# Patient Record
Sex: MALE | Race: WHITE | NOT HISPANIC OR LATINO
[De-identification: names, ages, dates, MRNs, and addresses within clinical notes are randomized per-mention and may not be internally consistent; named-entity substitution may affect disease eponyms.]

---

## 2017-04-03 ENCOUNTER — OTHER (OUTPATIENT)
Age: 82
End: 2017-04-03

## 2017-04-05 ENCOUNTER — APPOINTMENT (OUTPATIENT)
Dept: ORTHOPEDIC SURGERY | Facility: CLINIC | Age: 82
End: 2017-04-05

## 2017-04-05 LAB
25(OH)D3 SERPL-MCNC: 34.4 NG/ML
ALBUMIN SERPL ELPH-MCNC: 4.1 G/DL
ALP BLD-CCNC: 64 U/L
ALT SERPL-CCNC: 23 U/L
ANION GAP SERPL CALC-SCNC: 16 MMOL/L
APPEARANCE: CLEAR
AST SERPL-CCNC: 27 U/L
BACTERIA: NEGATIVE
BASOPHILS # BLD AUTO: 0.04 K/UL
BASOPHILS NFR BLD AUTO: 0.7 %
BILIRUB SERPL-MCNC: 0.6 MG/DL
BILIRUBIN URINE: NEGATIVE
BLOOD URINE: NEGATIVE
BUN SERPL-MCNC: 27 MG/DL
CALCIUM SERPL-MCNC: 10.1 MG/DL
CALCIUM SERPL-MCNC: 10.1 MG/DL
CHLORIDE SERPL-SCNC: 106 MMOL/L
CHOLEST SERPL-MCNC: 144 MG/DL
CHOLEST/HDLC SERPL: 3.3 RATIO
CO2 SERPL-SCNC: 23 MMOL/L
COLOR: YELLOW
CREAT SERPL-MCNC: 1.69 MG/DL
CREAT SPEC-SCNC: 26 MG/DL
CREAT/PROT UR: 1.8 RATIO
EOSINOPHIL # BLD AUTO: 0.18 K/UL
EOSINOPHIL NFR BLD AUTO: 3 %
FERRITIN SERPL-MCNC: 224.4 NG/ML
GLUCOSE QUALITATIVE U: NORMAL MG/DL
GLUCOSE SERPL-MCNC: 91 MG/DL
HBA1C MFR BLD HPLC: 4.9 %
HCT VFR BLD CALC: 43.2 %
HDLC SERPL-MCNC: 44 MG/DL
HGB BLD-MCNC: 14.9 G/DL
HYALINE CASTS: 1 /LPF
IMM GRANULOCYTES NFR BLD AUTO: 0.2 %
IRON SATN MFR SERPL: 63 %
IRON SERPL-MCNC: 155 UG/DL
KETONES URINE: NEGATIVE
LDLC SERPL CALC-MCNC: 73 MG/DL
LEUKOCYTE ESTERASE URINE: NEGATIVE
LYMPHOCYTES # BLD AUTO: 1.08 K/UL
LYMPHOCYTES NFR BLD AUTO: 18.3 %
MAN DIFF?: NORMAL
MCHC RBC-ENTMCNC: 30.9 PG
MCHC RBC-ENTMCNC: 34.5 GM/DL
MCV RBC AUTO: 89.6 FL
MICROSCOPIC-UA: NORMAL
MONOCYTES # BLD AUTO: 0.56 K/UL
MONOCYTES NFR BLD AUTO: 9.5 %
NEUTROPHILS # BLD AUTO: 4.04 K/UL
NEUTROPHILS NFR BLD AUTO: 68.3 %
NITRITE URINE: NEGATIVE
PARATHYROID HORMONE INTACT: 141 PG/ML
PH URINE: 6
PHOSPHATE SERPL-MCNC: 3 MG/DL
PLATELET # BLD AUTO: 217 K/UL
POTASSIUM SERPL-SCNC: 4.4 MMOL/L
PROT SERPL-MCNC: 7.1 G/DL
PROT UR-MCNC: 46 MG/DL
PROTEIN URINE: 30 MG/DL
RBC # BLD: 4.82 M/UL
RBC # FLD: 13.6 %
RED BLOOD CELLS URINE: 0 /HPF
SODIUM SERPL-SCNC: 145 MMOL/L
SPECIFIC GRAVITY URINE: 1.01
SQUAMOUS EPITHELIAL CELLS: 0 /HPF
TIBC SERPL-MCNC: 245 UG/DL
TRIGL SERPL-MCNC: 133 MG/DL
UIBC SERPL-MCNC: 90 UG/DL
URATE SERPL-MCNC: 6.5 MG/DL
UROBILINOGEN URINE: NORMAL MG/DL
WBC # FLD AUTO: 5.91 K/UL
WHITE BLOOD CELLS URINE: 0 /HPF

## 2017-04-06 ENCOUNTER — APPOINTMENT (OUTPATIENT)
Dept: NEPHROLOGY | Facility: CLINIC | Age: 82
End: 2017-04-06

## 2017-04-06 VITALS
HEIGHT: 65 IN | OXYGEN SATURATION: 99 % | SYSTOLIC BLOOD PRESSURE: 131 MMHG | WEIGHT: 160 LBS | DIASTOLIC BLOOD PRESSURE: 65 MMHG | HEART RATE: 60 BPM | BODY MASS INDEX: 26.66 KG/M2

## 2017-04-24 ENCOUNTER — NON-APPOINTMENT (OUTPATIENT)
Age: 82
End: 2017-04-24

## 2017-04-24 ENCOUNTER — APPOINTMENT (OUTPATIENT)
Dept: INTERNAL MEDICINE | Facility: CLINIC | Age: 82
End: 2017-04-24

## 2017-04-24 VITALS
SYSTOLIC BLOOD PRESSURE: 140 MMHG | OXYGEN SATURATION: 97 % | BODY MASS INDEX: 26.33 KG/M2 | WEIGHT: 158 LBS | TEMPERATURE: 97.9 F | HEART RATE: 58 BPM | HEIGHT: 65 IN | DIASTOLIC BLOOD PRESSURE: 64 MMHG

## 2017-04-25 RX ORDER — FINASTERIDE 5 MG/1
5 TABLET, FILM COATED ORAL
Refills: 0 | Status: DISCONTINUED | COMMUNITY
End: 2017-04-25

## 2017-04-25 RX ORDER — MULTIVITAMIN WITH MINERALS
TABLET ORAL
Refills: 0 | Status: DISCONTINUED | COMMUNITY
End: 2017-04-25

## 2017-07-25 ENCOUNTER — OTHER (OUTPATIENT)
Age: 82
End: 2017-07-25

## 2017-07-25 DIAGNOSIS — R68.89 OTHER GENERAL SYMPTOMS AND SIGNS: ICD-10-CM

## 2017-07-27 LAB
25(OH)D3 SERPL-MCNC: 24.8 NG/ML
ALBUMIN SERPL ELPH-MCNC: 4.3 G/DL
ALP BLD-CCNC: 74 U/L
ALT SERPL-CCNC: 21 U/L
ANION GAP SERPL CALC-SCNC: 17 MMOL/L
APPEARANCE: CLEAR
AST SERPL-CCNC: 25 U/L
BACTERIA: NEGATIVE
BASOPHILS # BLD AUTO: 0.05 K/UL
BASOPHILS NFR BLD AUTO: 0.7 %
BILIRUB SERPL-MCNC: 0.4 MG/DL
BILIRUBIN URINE: NEGATIVE
BLOOD URINE: NEGATIVE
BUN SERPL-MCNC: 28 MG/DL
CALCIUM SERPL-MCNC: 9.9 MG/DL
CALCIUM SERPL-MCNC: 9.9 MG/DL
CHLORIDE SERPL-SCNC: 105 MMOL/L
CO2 SERPL-SCNC: 23 MMOL/L
COLOR: YELLOW
CREAT SERPL-MCNC: 1.68 MG/DL
CREAT SPEC-SCNC: 45 MG/DL
EOSINOPHIL # BLD AUTO: 0.32 K/UL
EOSINOPHIL NFR BLD AUTO: 4.5 %
FERRITIN SERPL-MCNC: 183 NG/ML
GLUCOSE QUALITATIVE U: NORMAL MG/DL
GLUCOSE SERPL-MCNC: 76 MG/DL
HCT VFR BLD CALC: 44.3 %
HGB BLD-MCNC: 15.2 G/DL
HYALINE CASTS: 1 /LPF
IMM GRANULOCYTES NFR BLD AUTO: 0.3 %
IRON SATN MFR SERPL: 57 %
IRON SERPL-MCNC: 143 UG/DL
KETONES URINE: NEGATIVE
LEUKOCYTE ESTERASE URINE: NEGATIVE
LYMPHOCYTES # BLD AUTO: 1.26 K/UL
LYMPHOCYTES NFR BLD AUTO: 17.5 %
MAN DIFF?: NORMAL
MCHC RBC-ENTMCNC: 31.3 PG
MCHC RBC-ENTMCNC: 34.3 GM/DL
MCV RBC AUTO: 91.3 FL
MICROALBUMIN 24H UR DL<=1MG/L-MCNC: 70.6 MG/DL
MICROALBUMIN/CREAT 24H UR-RTO: 1569 MG/G
MICROSCOPIC-UA: NORMAL
MONOCYTES # BLD AUTO: 0.49 K/UL
MONOCYTES NFR BLD AUTO: 6.8 %
NEUTROPHILS # BLD AUTO: 5.05 K/UL
NEUTROPHILS NFR BLD AUTO: 70.2 %
NITRITE URINE: NEGATIVE
PARATHYROID HORMONE INTACT: 143 PG/ML
PH URINE: 6
PHOSPHATE SERPL-MCNC: 3.2 MG/DL
PLATELET # BLD AUTO: 207 K/UL
POTASSIUM SERPL-SCNC: 4.8 MMOL/L
PROT SERPL-MCNC: 6.8 G/DL
PROTEIN URINE: 100 MG/DL
RBC # BLD: 4.85 M/UL
RBC # FLD: 13.3 %
RED BLOOD CELLS URINE: 1 /HPF
SODIUM SERPL-SCNC: 145 MMOL/L
SPECIFIC GRAVITY URINE: 1.01
SQUAMOUS EPITHELIAL CELLS: 0 /HPF
TIBC SERPL-MCNC: 253 UG/DL
UIBC SERPL-MCNC: 110 UG/DL
URATE SERPL-MCNC: 6.7 MG/DL
UROBILINOGEN URINE: NORMAL MG/DL
WBC # FLD AUTO: 7.19 K/UL
WHITE BLOOD CELLS URINE: 0 /HPF

## 2017-08-14 ENCOUNTER — RX RENEWAL (OUTPATIENT)
Age: 82
End: 2017-08-14

## 2017-09-05 ENCOUNTER — APPOINTMENT (OUTPATIENT)
Dept: ORTHOPEDIC SURGERY | Facility: CLINIC | Age: 82
End: 2017-09-05

## 2017-09-05 ENCOUNTER — OTHER (OUTPATIENT)
Age: 82
End: 2017-09-05

## 2017-09-07 LAB
25(OH)D3 SERPL-MCNC: 23.5 NG/ML
ALBUMIN SERPL ELPH-MCNC: 4.3 G/DL
ALP BLD-CCNC: 72 U/L
ALT SERPL-CCNC: 16 U/L
ANION GAP SERPL CALC-SCNC: 17 MMOL/L
APPEARANCE: CLEAR
AST SERPL-CCNC: 24 U/L
BACTERIA: NEGATIVE
BASOPHILS # BLD AUTO: 0.05 K/UL
BASOPHILS NFR BLD AUTO: 0.8 %
BILIRUB SERPL-MCNC: 0.4 MG/DL
BILIRUBIN URINE: NEGATIVE
BLOOD URINE: NEGATIVE
BUN SERPL-MCNC: 22 MG/DL
CALCIUM SERPL-MCNC: 9.6 MG/DL
CALCIUM SERPL-MCNC: 9.6 MG/DL
CHLORIDE SERPL-SCNC: 102 MMOL/L
CO2 SERPL-SCNC: 24 MMOL/L
COLOR: YELLOW
CREAT SERPL-MCNC: 1.41 MG/DL
CREAT SPEC-SCNC: 53 MG/DL
EOSINOPHIL # BLD AUTO: 0.22 K/UL
EOSINOPHIL NFR BLD AUTO: 3.6 %
FERRITIN SERPL-MCNC: 195 NG/ML
GLUCOSE QUALITATIVE U: NORMAL MG/DL
GLUCOSE SERPL-MCNC: 82 MG/DL
HBA1C MFR BLD HPLC: 4.8 %
HCT VFR BLD CALC: 44.8 %
HGB BLD-MCNC: 15.1 G/DL
IMM GRANULOCYTES NFR BLD AUTO: 0 %
IRON SATN MFR SERPL: 54 %
IRON SERPL-MCNC: 149 UG/DL
KETONES URINE: NEGATIVE
LEUKOCYTE ESTERASE URINE: NEGATIVE
LYMPHOCYTES # BLD AUTO: 1.24 K/UL
LYMPHOCYTES NFR BLD AUTO: 20.1 %
MAN DIFF?: NORMAL
MCHC RBC-ENTMCNC: 30.9 PG
MCHC RBC-ENTMCNC: 33.7 GM/DL
MCV RBC AUTO: 91.8 FL
MICROALBUMIN 24H UR DL<=1MG/L-MCNC: 101.3 MG/DL
MICROALBUMIN/CREAT 24H UR-RTO: 1911 MG/G
MICROSCOPIC-UA: NORMAL
MONOCYTES # BLD AUTO: 0.5 K/UL
MONOCYTES NFR BLD AUTO: 8.1 %
NEUTROPHILS # BLD AUTO: 4.15 K/UL
NEUTROPHILS NFR BLD AUTO: 67.4 %
NITRITE URINE: NEGATIVE
PARATHYROID HORMONE INTACT: 142 PG/ML
PH URINE: 7
PHOSPHATE SERPL-MCNC: 2.8 MG/DL
PLATELET # BLD AUTO: 196 K/UL
POTASSIUM SERPL-SCNC: 4.2 MMOL/L
PROT SERPL-MCNC: 7.4 G/DL
PROTEIN URINE: 300 MG/DL
RBC # BLD: 4.88 M/UL
RBC # FLD: 13.6 %
RED BLOOD CELLS URINE: 1 /HPF
SODIUM SERPL-SCNC: 143 MMOL/L
SPECIFIC GRAVITY URINE: 1.01
SQUAMOUS EPITHELIAL CELLS: 0 /HPF
TIBC SERPL-MCNC: 275 UG/DL
UIBC SERPL-MCNC: 126 UG/DL
URATE SERPL-MCNC: 5.7 MG/DL
UROBILINOGEN URINE: NORMAL MG/DL
WBC # FLD AUTO: 6.16 K/UL
WHITE BLOOD CELLS URINE: 0 /HPF

## 2017-09-08 ENCOUNTER — APPOINTMENT (OUTPATIENT)
Dept: NEPHROLOGY | Facility: CLINIC | Age: 82
End: 2017-09-08
Payer: MEDICARE

## 2017-09-08 VITALS — DIASTOLIC BLOOD PRESSURE: 70 MMHG | SYSTOLIC BLOOD PRESSURE: 170 MMHG | HEART RATE: 64 BPM

## 2017-09-08 VITALS
HEART RATE: 60 BPM | DIASTOLIC BLOOD PRESSURE: 76 MMHG | WEIGHT: 162.04 LBS | OXYGEN SATURATION: 98 % | HEIGHT: 65 IN | BODY MASS INDEX: 27 KG/M2 | SYSTOLIC BLOOD PRESSURE: 174 MMHG

## 2017-09-08 PROCEDURE — 99214 OFFICE O/P EST MOD 30 MIN: CPT

## 2017-09-18 ENCOUNTER — MEDICATION RENEWAL (OUTPATIENT)
Age: 82
End: 2017-09-18

## 2017-09-20 ENCOUNTER — MEDICATION RENEWAL (OUTPATIENT)
Age: 82
End: 2017-09-20

## 2017-09-25 ENCOUNTER — MEDICATION RENEWAL (OUTPATIENT)
Age: 82
End: 2017-09-25

## 2017-09-26 ENCOUNTER — MEDICATION RENEWAL (OUTPATIENT)
Age: 82
End: 2017-09-26

## 2017-09-27 ENCOUNTER — APPOINTMENT (OUTPATIENT)
Dept: INTERNAL MEDICINE | Facility: CLINIC | Age: 82
End: 2017-09-27
Payer: MEDICARE

## 2017-09-27 PROCEDURE — G0008: CPT

## 2017-09-27 PROCEDURE — 90662 IIV NO PRSV INCREASED AG IM: CPT

## 2017-10-05 ENCOUNTER — APPOINTMENT (OUTPATIENT)
Dept: INTERNAL MEDICINE | Facility: CLINIC | Age: 82
End: 2017-10-05
Payer: MEDICARE

## 2017-10-05 VITALS
SYSTOLIC BLOOD PRESSURE: 140 MMHG | HEART RATE: 53 BPM | TEMPERATURE: 98.3 F | DIASTOLIC BLOOD PRESSURE: 72 MMHG | OXYGEN SATURATION: 98 % | HEIGHT: 63.5 IN

## 2017-10-05 VITALS — SYSTOLIC BLOOD PRESSURE: 136 MMHG | DIASTOLIC BLOOD PRESSURE: 80 MMHG

## 2017-10-05 PROCEDURE — 99213 OFFICE O/P EST LOW 20 MIN: CPT

## 2017-10-05 RX ORDER — METOPROLOL SUCCINATE 25 MG/1
25 TABLET, EXTENDED RELEASE ORAL
Qty: 90 | Refills: 1 | Status: DISCONTINUED | COMMUNITY
Start: 2017-09-18 | End: 2017-10-05

## 2017-10-23 ENCOUNTER — RX RENEWAL (OUTPATIENT)
Age: 82
End: 2017-10-23

## 2017-10-25 ENCOUNTER — RX RENEWAL (OUTPATIENT)
Age: 82
End: 2017-10-25

## 2017-11-10 ENCOUNTER — APPOINTMENT (OUTPATIENT)
Dept: INTERNAL MEDICINE | Facility: CLINIC | Age: 82
End: 2017-11-10
Payer: MEDICARE

## 2017-11-10 PROCEDURE — 36415 COLL VENOUS BLD VENIPUNCTURE: CPT

## 2017-11-13 ENCOUNTER — APPOINTMENT (OUTPATIENT)
Dept: INTERNAL MEDICINE | Facility: CLINIC | Age: 82
End: 2017-11-13
Payer: MEDICARE

## 2017-11-13 VITALS
DIASTOLIC BLOOD PRESSURE: 70 MMHG | WEIGHT: 165 LBS | HEIGHT: 64.5 IN | OXYGEN SATURATION: 98 % | BODY MASS INDEX: 27.83 KG/M2 | HEART RATE: 60 BPM | SYSTOLIC BLOOD PRESSURE: 150 MMHG | TEMPERATURE: 97.8 F

## 2017-11-13 VITALS — DIASTOLIC BLOOD PRESSURE: 70 MMHG | SYSTOLIC BLOOD PRESSURE: 140 MMHG

## 2017-11-13 LAB
ALBUMIN SERPL ELPH-MCNC: 3.8 G/DL
ALP BLD-CCNC: 68 U/L
ALT SERPL-CCNC: 24 U/L
ANION GAP SERPL CALC-SCNC: 14 MMOL/L
AST SERPL-CCNC: 28 U/L
BASOPHILS # BLD AUTO: 0.04 K/UL
BASOPHILS NFR BLD AUTO: 0.6 %
BILIRUB SERPL-MCNC: 0.4 MG/DL
BUN SERPL-MCNC: 29 MG/DL
CALCIUM SERPL-MCNC: 9.9 MG/DL
CHLORIDE SERPL-SCNC: 104 MMOL/L
CHOLEST SERPL-MCNC: 150 MG/DL
CHOLEST/HDLC SERPL: 3.4 RATIO
CO2 SERPL-SCNC: 22 MMOL/L
CREAT SERPL-MCNC: 1.7 MG/DL
EOSINOPHIL # BLD AUTO: 0.23 K/UL
EOSINOPHIL NFR BLD AUTO: 3.6 %
GLUCOSE SERPL-MCNC: 97 MG/DL
HCT VFR BLD CALC: 42.3 %
HDLC SERPL-MCNC: 44 MG/DL
HGB BLD-MCNC: 14.6 G/DL
IMM GRANULOCYTES NFR BLD AUTO: 0.3 %
LDLC SERPL CALC-MCNC: 73 MG/DL
LYMPHOCYTES # BLD AUTO: 1.06 K/UL
LYMPHOCYTES NFR BLD AUTO: 16.7 %
MAN DIFF?: NORMAL
MCHC RBC-ENTMCNC: 31.7 PG
MCHC RBC-ENTMCNC: 34.5 GM/DL
MCV RBC AUTO: 91.8 FL
MONOCYTES # BLD AUTO: 0.6 K/UL
MONOCYTES NFR BLD AUTO: 9.5 %
NEUTROPHILS # BLD AUTO: 4.38 K/UL
NEUTROPHILS NFR BLD AUTO: 69.3 %
PHOSPHATE SERPL-MCNC: 3.1 MG/DL
PLATELET # BLD AUTO: 178 K/UL
POTASSIUM SERPL-SCNC: 3.9 MMOL/L
PROT SERPL-MCNC: 7.4 G/DL
RBC # BLD: 4.61 M/UL
RBC # FLD: 13.7 %
SODIUM SERPL-SCNC: 140 MMOL/L
TRIGL SERPL-MCNC: 165 MG/DL
WBC # FLD AUTO: 6.33 K/UL

## 2017-11-13 PROCEDURE — 99214 OFFICE O/P EST MOD 30 MIN: CPT

## 2017-12-05 ENCOUNTER — APPOINTMENT (OUTPATIENT)
Dept: INTERNAL MEDICINE | Facility: CLINIC | Age: 82
End: 2017-12-05

## 2017-12-26 ENCOUNTER — RX RENEWAL (OUTPATIENT)
Age: 82
End: 2017-12-26

## 2017-12-29 ENCOUNTER — MEDICATION RENEWAL (OUTPATIENT)
Age: 82
End: 2017-12-29

## 2018-04-05 ENCOUNTER — APPOINTMENT (OUTPATIENT)
Dept: INTERNAL MEDICINE | Facility: CLINIC | Age: 83
End: 2018-04-05
Payer: MEDICARE

## 2018-04-05 PROCEDURE — 36415 COLL VENOUS BLD VENIPUNCTURE: CPT

## 2018-04-09 ENCOUNTER — NON-APPOINTMENT (OUTPATIENT)
Age: 83
End: 2018-04-09

## 2018-04-09 ENCOUNTER — APPOINTMENT (OUTPATIENT)
Dept: INTERNAL MEDICINE | Facility: CLINIC | Age: 83
End: 2018-04-09
Payer: MEDICARE

## 2018-04-09 VITALS
BODY MASS INDEX: 27.66 KG/M2 | HEART RATE: 62 BPM | OXYGEN SATURATION: 98 % | HEIGHT: 64 IN | TEMPERATURE: 97.6 F | DIASTOLIC BLOOD PRESSURE: 66 MMHG | WEIGHT: 162 LBS | SYSTOLIC BLOOD PRESSURE: 126 MMHG

## 2018-04-09 VITALS — HEART RATE: 64 BPM | SYSTOLIC BLOOD PRESSURE: 130 MMHG | DIASTOLIC BLOOD PRESSURE: 70 MMHG

## 2018-04-09 DIAGNOSIS — M72.2 PLANTAR FASCIAL FIBROMATOSIS: ICD-10-CM

## 2018-04-09 LAB
25(OH)D3 SERPL-MCNC: 31.5 NG/ML
ALBUMIN SERPL ELPH-MCNC: 4.4 G/DL
ALP BLD-CCNC: 63 U/L
ALT SERPL-CCNC: 27 U/L
ANION GAP SERPL CALC-SCNC: 13 MMOL/L
APPEARANCE: CLEAR
AST SERPL-CCNC: 35 U/L
BACTERIA: NEGATIVE
BASOPHILS # BLD AUTO: 0.05 K/UL
BASOPHILS NFR BLD AUTO: 0.9 %
BILIRUB SERPL-MCNC: 0.6 MG/DL
BILIRUBIN URINE: NEGATIVE
BLOOD URINE: NEGATIVE
BUN SERPL-MCNC: 36 MG/DL
CALCIUM SERPL-MCNC: 10 MG/DL
CALCIUM SERPL-MCNC: 10 MG/DL
CHLORIDE SERPL-SCNC: 104 MMOL/L
CHOLEST SERPL-MCNC: 170 MG/DL
CHOLEST/HDLC SERPL: 3.7 RATIO
CO2 SERPL-SCNC: 23 MMOL/L
COLOR: YELLOW
CREAT SERPL-MCNC: 1.78 MG/DL
CREAT SPEC-SCNC: 32 MG/DL
CREAT/PROT UR: 2.3 RATIO
EOSINOPHIL # BLD AUTO: 0.23 K/UL
EOSINOPHIL NFR BLD AUTO: 4 %
GLUCOSE QUALITATIVE U: NEGATIVE MG/DL
GLUCOSE SERPL-MCNC: 89 MG/DL
HBA1C MFR BLD HPLC: 4.7 %
HCT VFR BLD CALC: 44.5 %
HDLC SERPL-MCNC: 46 MG/DL
HGB BLD-MCNC: 14.6 G/DL
HYALINE CASTS: 1 /LPF
IMM GRANULOCYTES NFR BLD AUTO: 0.2 %
KETONES URINE: NEGATIVE
LDLC SERPL CALC-MCNC: 97 MG/DL
LEUKOCYTE ESTERASE URINE: NEGATIVE
LYMPHOCYTES # BLD AUTO: 0.92 K/UL
LYMPHOCYTES NFR BLD AUTO: 15.9 %
MAN DIFF?: NORMAL
MCHC RBC-ENTMCNC: 31.2 PG
MCHC RBC-ENTMCNC: 32.8 GM/DL
MCV RBC AUTO: 95.1 FL
MICROSCOPIC-UA: NORMAL
MONOCYTES # BLD AUTO: 0.55 K/UL
MONOCYTES NFR BLD AUTO: 9.5 %
NEUTROPHILS # BLD AUTO: 4.02 K/UL
NEUTROPHILS NFR BLD AUTO: 69.5 %
NITRITE URINE: NEGATIVE
PARATHYROID HORMONE INTACT: 98 PG/ML
PH URINE: 7
PHOSPHATE SERPL-MCNC: 3.1 MG/DL
PLATELET # BLD AUTO: 189 K/UL
POTASSIUM SERPL-SCNC: 4.3 MMOL/L
PROT SERPL-MCNC: 7.4 G/DL
PROT UR-MCNC: 73 MG/DL
PROTEIN URINE: 100 MG/DL
RBC # BLD: 4.68 M/UL
RBC # FLD: 14 %
RED BLOOD CELLS URINE: 1 /HPF
SODIUM SERPL-SCNC: 140 MMOL/L
SPECIFIC GRAVITY URINE: 1.01
SQUAMOUS EPITHELIAL CELLS: 0 /HPF
TRIGL SERPL-MCNC: 136 MG/DL
TSH SERPL-ACNC: 2.45 UIU/ML
URATE SERPL-MCNC: 6.7 MG/DL
UROBILINOGEN URINE: NEGATIVE MG/DL
WBC # FLD AUTO: 5.78 K/UL
WHITE BLOOD CELLS URINE: 0 /HPF

## 2018-04-09 PROCEDURE — G0439: CPT

## 2018-04-09 PROCEDURE — 99214 OFFICE O/P EST MOD 30 MIN: CPT | Mod: 25

## 2018-04-09 PROCEDURE — 93000 ELECTROCARDIOGRAM COMPLETE: CPT | Mod: 59

## 2018-04-13 ENCOUNTER — APPOINTMENT (OUTPATIENT)
Dept: NEPHROLOGY | Facility: CLINIC | Age: 83
End: 2018-04-13
Payer: MEDICARE

## 2018-04-13 VITALS
HEART RATE: 60 BPM | DIASTOLIC BLOOD PRESSURE: 60 MMHG | WEIGHT: 162 LBS | HEIGHT: 64 IN | BODY MASS INDEX: 27.66 KG/M2 | SYSTOLIC BLOOD PRESSURE: 134 MMHG

## 2018-04-13 PROCEDURE — 99213 OFFICE O/P EST LOW 20 MIN: CPT

## 2018-07-06 ENCOUNTER — MEDICATION RENEWAL (OUTPATIENT)
Age: 83
End: 2018-07-06

## 2018-07-16 ENCOUNTER — MEDICATION RENEWAL (OUTPATIENT)
Age: 83
End: 2018-07-16

## 2018-08-08 ENCOUNTER — RX RENEWAL (OUTPATIENT)
Age: 83
End: 2018-08-08

## 2018-08-08 ENCOUNTER — MEDICATION RENEWAL (OUTPATIENT)
Age: 83
End: 2018-08-08

## 2018-10-02 ENCOUNTER — APPOINTMENT (OUTPATIENT)
Dept: INTERNAL MEDICINE | Facility: CLINIC | Age: 83
End: 2018-10-02
Payer: SELF-PAY

## 2018-10-02 PROCEDURE — 90750 HZV VACC RECOMBINANT IM: CPT

## 2018-10-02 PROCEDURE — 90471 IMMUNIZATION ADMIN: CPT

## 2018-10-11 ENCOUNTER — MEDICATION RENEWAL (OUTPATIENT)
Age: 83
End: 2018-10-11

## 2018-10-18 ENCOUNTER — APPOINTMENT (OUTPATIENT)
Dept: INTERNAL MEDICINE | Facility: CLINIC | Age: 83
End: 2018-10-18
Payer: MEDICARE

## 2018-10-18 VITALS
BODY MASS INDEX: 28.51 KG/M2 | HEIGHT: 64 IN | SYSTOLIC BLOOD PRESSURE: 136 MMHG | HEART RATE: 77 BPM | TEMPERATURE: 99.2 F | WEIGHT: 167 LBS | DIASTOLIC BLOOD PRESSURE: 66 MMHG | OXYGEN SATURATION: 96 %

## 2018-10-18 VITALS — DIASTOLIC BLOOD PRESSURE: 80 MMHG | SYSTOLIC BLOOD PRESSURE: 140 MMHG

## 2018-10-18 PROCEDURE — 99213 OFFICE O/P EST LOW 20 MIN: CPT

## 2018-10-18 NOTE — PHYSICAL EXAM
[No Acute Distress] : no acute distress [Well Nourished] : well nourished [Well Developed] : well developed [Normal Voice/Communication] : normal voice/communication [Normal Sclera/Conjunctiva] : normal sclera/conjunctiva [PERRL] : pupils equal round and reactive to light [EOMI] : extraocular movements intact [Normal Outer Ear/Nose] : the outer ears and nose were normal in appearance [Normal Oropharynx] : the oropharynx was normal [Normal TMs] : both tympanic membranes were normal [Supple] : supple [No Lymphadenopathy] : no lymphadenopathy [No Respiratory Distress] : no respiratory distress  [Clear to Auscultation] : lungs were clear to auscultation bilaterally [No Accessory Muscle Use] : no accessory muscle use [Normal Percussion] : the chest was normal to percussion [Normal Rate] : normal rate  [Regular Rhythm] : with a regular rhythm [Normal S1, S2] : normal S1 and S2 [No Murmur] : no murmur heard [No Edema] : there was no peripheral edema [Normal Supraclavicular Nodes] : no supraclavicular lymphadenopathy [Normal Posterior Cervical Nodes] : no posterior cervical lymphadenopathy [Normal Anterior Cervical Nodes] : no anterior cervical lymphadenopathy [Normal Inguinal Nodes] : no inguinal lymphadenopathy [No CVA Tenderness] : no CVA  tenderness [No Spinal Tenderness] : no spinal tenderness [No Rash] : no rash [Speech Grossly Normal] : speech grossly normal [Memory Grossly Normal] : memory grossly normal [Normal Affect] : the affect was normal [Alert and Oriented x3] : oriented to person, place, and time [Normal Mood] : the mood was normal [Normal Insight/Judgement] : insight and judgment were intact [de-identified] : Sniffling and sneezing at times with occasional hacking cough

## 2018-10-18 NOTE — HISTORY OF PRESENT ILLNESS
[FreeTextEntry8] : He has not been feeling well for the past 24 hours. He has a cough sneezing stuffy nose. He has no sore throat fever. He is coughing up minimal yellow mucus. He is only using some BenGay. His only exposure was sitting next to  someone in a [employer] restaurant 2 days ago who was coughing.  There has been no travel. . He has no diarrhea. He is not achy everywhere. He did receive the flu vaccine this year [Spouse] : spouse

## 2018-10-18 NOTE — ASSESSMENT
[FreeTextEntry1] : Patient appears to have an upper respiratory tract infection which I suspect is viral in nature. We discussed that antibiotics are not going to help a virus and it just needs to run its course over the next week to 10 days. . He was advised to rest drink plenty of fluids.   He may use Mucinex DM or Robitussin-DM p.r.n..  If he develops a fever above 100.5 or  dyspnea on exertion he will let me now.\par All reviewed with wife as well

## 2018-10-24 ENCOUNTER — APPOINTMENT (OUTPATIENT)
Dept: INTERNAL MEDICINE | Facility: CLINIC | Age: 83
End: 2018-10-24
Payer: MEDICARE

## 2018-10-24 VITALS
TEMPERATURE: 98.6 F | WEIGHT: 162 LBS | DIASTOLIC BLOOD PRESSURE: 60 MMHG | OXYGEN SATURATION: 98 % | HEART RATE: 54 BPM | BODY MASS INDEX: 27.66 KG/M2 | HEIGHT: 64 IN | SYSTOLIC BLOOD PRESSURE: 110 MMHG

## 2018-10-24 PROCEDURE — 99213 OFFICE O/P EST LOW 20 MIN: CPT

## 2018-10-24 NOTE — HISTORY OF PRESENT ILLNESS
[Spouse] : spouse [FreeTextEntry8] : Patient was seen by Dr. Morales last Thursday for evaluation of a cough and stuffy nose. He was told likely had viral URI and to take cough suppressant. He had been taking both mucinex and robitussin-DM together, stopped both yesterday. His cough has dissipated, still bringing up yellow sputum. He is here because he is concerned of wheezing. He has no fever or chills

## 2018-10-24 NOTE — ASSESSMENT
[FreeTextEntry1] : Patient has a URI. He is now wheezing on exam. His cough is somewhat better but still present. Will start a Z-pack. Start Proair PRN for cough or wheeze. Rest, hydrate well. Advised not to take both mucinex and robitussin together. Call back office PRN.

## 2018-10-24 NOTE — PHYSICAL EXAM
[No Acute Distress] : no acute distress [Well Nourished] : well nourished [Well-Appearing] : well-appearing [Normal Outer Ear/Nose] : the outer ears and nose were normal in appearance [Normal Oropharynx] : the oropharynx was normal [Normal TMs] : both tympanic membranes were normal [Supple] : supple [No Lymphadenopathy] : no lymphadenopathy [No Respiratory Distress] : no respiratory distress  [No Accessory Muscle Use] : no accessory muscle use [Normal Rate] : normal rate  [Normal S1, S2] : normal S1 and S2 [No Murmur] : no murmur heard [No Edema] : there was no peripheral edema [Soft] : abdomen soft [Non Tender] : non-tender [de-identified] : +hearing aides

## 2018-10-24 NOTE — REVIEW OF SYSTEMS
[Cough] : cough [Fever] : no fever [Chills] : no chills [Fatigue] : no fatigue [Earache] : no earache [Nasal Discharge] : no nasal discharge [Sore Throat] : no sore throat [Chest Pain] : no chest pain [Shortness Of Breath] : no shortness of breath [Wheezing] : wheezing [Abdominal Pain] : no abdominal pain [Nausea] : no nausea [Diarrhea] : no diarrhea

## 2018-10-29 ENCOUNTER — APPOINTMENT (OUTPATIENT)
Dept: INTERNAL MEDICINE | Facility: CLINIC | Age: 83
End: 2018-10-29
Payer: MEDICARE

## 2018-10-29 PROCEDURE — 36415 COLL VENOUS BLD VENIPUNCTURE: CPT

## 2018-10-30 ENCOUNTER — APPOINTMENT (OUTPATIENT)
Dept: NEPHROLOGY | Facility: CLINIC | Age: 83
End: 2018-10-30
Payer: MEDICARE

## 2018-10-30 VITALS
WEIGHT: 162 LBS | HEART RATE: 56 BPM | HEIGHT: 64 IN | BODY MASS INDEX: 27.66 KG/M2 | OXYGEN SATURATION: 98 % | SYSTOLIC BLOOD PRESSURE: 161 MMHG | DIASTOLIC BLOOD PRESSURE: 74 MMHG

## 2018-10-30 VITALS — DIASTOLIC BLOOD PRESSURE: 60 MMHG | HEART RATE: 58 BPM | SYSTOLIC BLOOD PRESSURE: 156 MMHG

## 2018-10-30 PROCEDURE — 99214 OFFICE O/P EST MOD 30 MIN: CPT

## 2018-11-05 ENCOUNTER — MEDICATION RENEWAL (OUTPATIENT)
Age: 83
End: 2018-11-05

## 2018-12-12 ENCOUNTER — MEDICATION RENEWAL (OUTPATIENT)
Age: 83
End: 2018-12-12

## 2018-12-13 ENCOUNTER — MEDICATION RENEWAL (OUTPATIENT)
Age: 83
End: 2018-12-13

## 2019-03-21 ENCOUNTER — HOSPITAL ENCOUNTER (EMERGENCY)
Dept: HOSPITAL 93 - ER | Age: 84
LOS: 1 days | Discharge: HOME | End: 2019-03-22
Payer: COMMERCIAL

## 2019-03-21 VITALS — BODY MASS INDEX: 27.16 KG/M2 | HEIGHT: 65 IN | WEIGHT: 163 LBS
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
## 2019-03-21 DIAGNOSIS — S86.811A: Primary | ICD-10-CM
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
## 2019-03-28 ENCOUNTER — APPOINTMENT (OUTPATIENT)
Dept: INTERNAL MEDICINE | Facility: CLINIC | Age: 84
End: 2019-03-28
Payer: SELF-PAY

## 2019-03-28 PROCEDURE — 90750 HZV VACC RECOMBINANT IM: CPT

## 2019-03-28 PROCEDURE — 90471 IMMUNIZATION ADMIN: CPT

## 2019-04-01 ENCOUNTER — APPOINTMENT (OUTPATIENT)
Dept: ORTHOPEDIC SURGERY | Facility: CLINIC | Age: 84
End: 2019-04-01
Payer: MEDICARE

## 2019-04-01 VITALS
WEIGHT: 163 LBS | HEIGHT: 64 IN | HEART RATE: 64 BPM | DIASTOLIC BLOOD PRESSURE: 65 MMHG | SYSTOLIC BLOOD PRESSURE: 158 MMHG | BODY MASS INDEX: 27.83 KG/M2

## 2019-04-01 PROCEDURE — 99214 OFFICE O/P EST MOD 30 MIN: CPT

## 2019-04-01 NOTE — PHYSICAL EXAM
[de-identified] : Patient is well nourished, well-developed, in no acute distress, with appropriate mood and affect. The patient is oriented to time, place, and person. Respirations are even and unlabored. Gait evaluation does not reveal a limp. There is no inguinal adenopathy. Examination of the contralateral hip shows normal range of motion, strength, no tenderness, and intact skin. The affected limb is well-perfused and showed 2+ dp/pt pulses, without skin lesions, shows a grossly normal motor and sensory examination. Examination of the hip shows no skin lesions. Hip motion is full and painless from 0-90 degrees extension to flexion, 20 degrees adduction and 20 degrees abduction, and 15 degrees internal and 30 degrees external rotation. Leg lengths are approximately equal. FADIR is negative and JESUS is negative. Stinchfield test is negative. Both hips are stable and muscle strength is normal with good strength with resisted abduction and adduction. Pedal pulses are palpable. [de-identified] : Patient preferred to defer imaging today.

## 2019-04-01 NOTE — HISTORY OF PRESENT ILLNESS
[de-identified] : This is a very pleasant 89-year-old gentleman who states that he had a slip and fall approximately 3 weeks ago.  He did not fall to the ground.  He did not strike his body on the ground.  He did not strike his head.  This was mechanical in nature.  He developed some ecchymosis over the posterior lateral aspect of his thigh which did travel down his leg but this is completely resolved at this point.  He has no pain today.  When he did fall he had some pain that was mild in severity.  His walking tolerance is not impaired.  His activities of daily living are not impaired.  He does not use a cane or walker.  He did not try any physical therapy.  Is not taking any NSAIDs.  Pain is not radiating down his leg and he does not eyes any numbness or tingling or weakness in the leg.

## 2019-04-01 NOTE — DISCUSSION/SUMMARY
[de-identified] : This patient likely had a muscle contusion secondary to a slip and fall.  Recommended supportive care with ice and elevation as needed.  Over-the-counter NSAIDs as needed.  Home exercise program PRN.  Follow-up PRN.

## 2019-04-04 ENCOUNTER — RX RENEWAL (OUTPATIENT)
Age: 84
End: 2019-04-04

## 2019-04-10 LAB
25(OH)D3 SERPL-MCNC: 30.7 NG/ML
ALBUMIN SERPL ELPH-MCNC: 4.3 G/DL
ALP BLD-CCNC: 66 U/L
ALT SERPL-CCNC: 20 U/L
ANION GAP SERPL CALC-SCNC: 12 MMOL/L
APPEARANCE: CLEAR
AST SERPL-CCNC: 29 U/L
BACTERIA: NEGATIVE
BASOPHILS # BLD AUTO: 0.05 K/UL
BASOPHILS NFR BLD AUTO: 0.7 %
BILIRUB SERPL-MCNC: 0.4 MG/DL
BILIRUBIN URINE: NEGATIVE
BLOOD URINE: NEGATIVE
BUN SERPL-MCNC: 33 MG/DL
CALCIUM SERPL-MCNC: 10 MG/DL
CALCIUM SERPL-MCNC: 10 MG/DL
CHLORIDE SERPL-SCNC: 106 MMOL/L
CO2 SERPL-SCNC: 25 MMOL/L
COLOR: YELLOW
CREAT SERPL-MCNC: 1.81 MG/DL
CREAT SPEC-SCNC: 75 MG/DL
CREAT/PROT UR: 1.6 RATIO
EOSINOPHIL # BLD AUTO: 0.19 K/UL
EOSINOPHIL NFR BLD AUTO: 2.7 %
FERRITIN SERPL-MCNC: 235 NG/ML
GLUCOSE QUALITATIVE U: NEGATIVE MG/DL
GLUCOSE SERPL-MCNC: 79 MG/DL
HCT VFR BLD CALC: 43.2 %
HGB BLD-MCNC: 15 G/DL
HYALINE CASTS: 1 /LPF
IMM GRANULOCYTES NFR BLD AUTO: 0.3 %
IRON SATN MFR SERPL: 47 %
IRON SERPL-MCNC: 121 UG/DL
KETONES URINE: NEGATIVE
LEUKOCYTE ESTERASE URINE: NEGATIVE
LYMPHOCYTES # BLD AUTO: 1.29 K/UL
LYMPHOCYTES NFR BLD AUTO: 18.2 %
MAN DIFF?: NORMAL
MCHC RBC-ENTMCNC: 31.8 PG
MCHC RBC-ENTMCNC: 34.7 GM/DL
MCV RBC AUTO: 91.5 FL
MICROSCOPIC-UA: NORMAL
MONOCYTES # BLD AUTO: 0.9 K/UL
MONOCYTES NFR BLD AUTO: 12.7 %
NEUTROPHILS # BLD AUTO: 4.63 K/UL
NEUTROPHILS NFR BLD AUTO: 65.4 %
NITRITE URINE: NEGATIVE
PARATHYROID HORMONE INTACT: 149 PG/ML
PH URINE: 5.5
PHOSPHATE SERPL-MCNC: 3.1 MG/DL
PLATELET # BLD AUTO: 218 K/UL
POTASSIUM SERPL-SCNC: 4.6 MMOL/L
PROT SERPL-MCNC: 7.3 G/DL
PROT UR-MCNC: 121 MG/DL
PROTEIN URINE: 100 MG/DL
RBC # BLD: 4.72 M/UL
RBC # FLD: 13.6 %
RED BLOOD CELLS URINE: 0 /HPF
SODIUM SERPL-SCNC: 143 MMOL/L
SPECIFIC GRAVITY URINE: 1.02
SQUAMOUS EPITHELIAL CELLS: 0 /HPF
TIBC SERPL-MCNC: 255 UG/DL
UIBC SERPL-MCNC: 134 UG/DL
URATE SERPL-MCNC: 6.1 MG/DL
UROBILINOGEN URINE: NEGATIVE MG/DL
WBC # FLD AUTO: 7.08 K/UL
WHITE BLOOD CELLS URINE: 1 /HPF

## 2019-04-11 ENCOUNTER — APPOINTMENT (OUTPATIENT)
Dept: INTERNAL MEDICINE | Facility: CLINIC | Age: 84
End: 2019-04-11
Payer: MEDICARE

## 2019-04-11 ENCOUNTER — LABORATORY RESULT (OUTPATIENT)
Age: 84
End: 2019-04-11

## 2019-04-11 PROCEDURE — 36415 COLL VENOUS BLD VENIPUNCTURE: CPT

## 2019-04-16 ENCOUNTER — APPOINTMENT (OUTPATIENT)
Dept: NEPHROLOGY | Facility: CLINIC | Age: 84
End: 2019-04-16
Payer: MEDICARE

## 2019-04-16 ENCOUNTER — FORM ENCOUNTER (OUTPATIENT)
Age: 84
End: 2019-04-16

## 2019-04-16 VITALS
BODY MASS INDEX: 27.85 KG/M2 | OXYGEN SATURATION: 98 % | HEART RATE: 51 BPM | SYSTOLIC BLOOD PRESSURE: 159 MMHG | HEIGHT: 64 IN | WEIGHT: 163.14 LBS | DIASTOLIC BLOOD PRESSURE: 76 MMHG

## 2019-04-16 VITALS — DIASTOLIC BLOOD PRESSURE: 60 MMHG | HEART RATE: 58 BPM | SYSTOLIC BLOOD PRESSURE: 150 MMHG

## 2019-04-16 PROCEDURE — 99214 OFFICE O/P EST MOD 30 MIN: CPT

## 2019-04-16 RX ORDER — LEVOFLOXACIN 500 MG/1
500 TABLET, FILM COATED ORAL
Qty: 7 | Refills: 0 | Status: DISCONTINUED | COMMUNITY
Start: 2019-03-22

## 2019-04-16 NOTE — PHYSICAL EXAM
[General Appearance - Alert] : alert [General Appearance - In No Acute Distress] : in no acute distress [Neck Appearance] : the appearance of the neck was normal [Outer Ear] : the ears and nose were normal in appearance [Sclera] : the sclera and conjunctiva were normal [Auscultation Breath Sounds / Voice Sounds] : lungs were clear to auscultation bilaterally [Heart Rate And Rhythm] : heart rate was normal and rhythm regular [Heart Sounds] : normal S1 and S2 [FreeTextEntry1] : trace edema right > left [Heart Sounds Pericardial Friction Rub] : no pericardial rub [Bowel Sounds] : normal bowel sounds [Abdomen Tenderness] : non-tender [Abdomen Soft] : soft [No CVA Tenderness] : no ~M costovertebral angle tenderness [No Focal Deficits] : no focal deficits [Oriented To Time, Place, And Person] : oriented to person, place, and time [] : no rash [Affect] : the affect was normal [Mood] : the mood was normal

## 2019-04-16 NOTE — ASSESSMENT
[FreeTextEntry1] : HTN: BP slightly higher than goal \par Keep amlodipine, diovan, hydralazine along with all other meds\par CKD-3: stable CKD\par Proteinuria: stable proteinuria. Modest protein diet. Continue ARB\par Continue his Vit D 2000 MWF and continue calcitriol same dose. PTH at goal\par Followup 6 months

## 2019-04-16 NOTE — HISTORY OF PRESENT ILLNESS
[FreeTextEntry1] : 88 yo male here for CKD3 HTN followup\par He and wife are back to [place] and [place]. Their great granddaughter is there and expecting another grandchild\par BP in [place] was normal and controlled\par \par

## 2019-04-17 ENCOUNTER — APPOINTMENT (OUTPATIENT)
Dept: ULTRASOUND IMAGING | Facility: CLINIC | Age: 84
End: 2019-04-17
Payer: MEDICARE

## 2019-04-17 ENCOUNTER — NON-APPOINTMENT (OUTPATIENT)
Age: 84
End: 2019-04-17

## 2019-04-17 ENCOUNTER — OUTPATIENT (OUTPATIENT)
Dept: OUTPATIENT SERVICES | Facility: HOSPITAL | Age: 84
LOS: 1 days | End: 2019-04-17
Payer: MEDICARE

## 2019-04-17 ENCOUNTER — APPOINTMENT (OUTPATIENT)
Dept: INTERNAL MEDICINE | Facility: CLINIC | Age: 84
End: 2019-04-17
Payer: MEDICARE

## 2019-04-17 VITALS
TEMPERATURE: 98 F | SYSTOLIC BLOOD PRESSURE: 122 MMHG | DIASTOLIC BLOOD PRESSURE: 62 MMHG | HEIGHT: 64 IN | BODY MASS INDEX: 27.14 KG/M2 | HEART RATE: 59 BPM | WEIGHT: 159 LBS | OXYGEN SATURATION: 98 %

## 2019-04-17 VITALS — SYSTOLIC BLOOD PRESSURE: 140 MMHG | DIASTOLIC BLOOD PRESSURE: 80 MMHG

## 2019-04-17 DIAGNOSIS — R21 RASH AND OTHER NONSPECIFIC SKIN ERUPTION: ICD-10-CM

## 2019-04-17 DIAGNOSIS — R94.6 ABNORMAL RESULTS OF THYROID FUNCTION STUDIES: ICD-10-CM

## 2019-04-17 DIAGNOSIS — Z87.09 PERSONAL HISTORY OF OTHER DISEASES OF THE RESPIRATORY SYSTEM: ICD-10-CM

## 2019-04-17 DIAGNOSIS — M25.462 EFFUSION, LEFT KNEE: ICD-10-CM

## 2019-04-17 DIAGNOSIS — Z23 ENCOUNTER FOR IMMUNIZATION: ICD-10-CM

## 2019-04-17 LAB
24R-OH-CALCIDIOL SERPL-MCNC: 22.7 PG/ML
25(OH)D3 SERPL-MCNC: 33.7 NG/ML
ALBUMIN SERPL ELPH-MCNC: 4.2 G/DL
ALP BLD-CCNC: 59 U/L
ALT SERPL-CCNC: 20 U/L
ANION GAP SERPL CALC-SCNC: 10 MMOL/L
AST SERPL-CCNC: 29 U/L
BASOPHILS # BLD AUTO: 0.06 K/UL
BASOPHILS NFR BLD AUTO: 0.9 %
BILIRUB SERPL-MCNC: 0.5 MG/DL
BUN SERPL-MCNC: 26 MG/DL
CALCIUM SERPL-MCNC: 9.9 MG/DL
CALCIUM SERPL-MCNC: 9.9 MG/DL
CHLORIDE SERPL-SCNC: 107 MMOL/L
CHOLEST SERPL-MCNC: 115 MG/DL
CHOLEST/HDLC SERPL: 2.7 RATIO
CO2 SERPL-SCNC: 26 MMOL/L
CREAT SERPL-MCNC: 1.81 MG/DL
EOSINOPHIL # BLD AUTO: 0.12 K/UL
EOSINOPHIL NFR BLD AUTO: 1.9 %
ESTIMATED AVERAGE GLUCOSE: 85 MG/DL
GLUCOSE SERPL-MCNC: 90 MG/DL
HBA1C MFR BLD HPLC: 4.6 %
HCT VFR BLD CALC: 41.4 %
HDLC SERPL-MCNC: 42 MG/DL
HGB BLD-MCNC: 13.6 G/DL
IMM GRANULOCYTES NFR BLD AUTO: 0.2 %
LDLC SERPL CALC-MCNC: 50 MG/DL
LYMPHOCYTES # BLD AUTO: 0.76 K/UL
LYMPHOCYTES NFR BLD AUTO: 11.8 %
MAN DIFF?: NORMAL
MCHC RBC-ENTMCNC: 31.4 PG
MCHC RBC-ENTMCNC: 32.9 GM/DL
MCV RBC AUTO: 95.6 FL
MONOCYTES # BLD AUTO: 0.55 K/UL
MONOCYTES NFR BLD AUTO: 8.5 %
NEUTROPHILS # BLD AUTO: 4.95 K/UL
NEUTROPHILS NFR BLD AUTO: 76.7 %
PARATHYROID HORMONE INTACT: 114 PG/ML
PHOSPHATE SERPL-MCNC: 3 MG/DL
PLATELET # BLD AUTO: 212 K/UL
POTASSIUM SERPL-SCNC: 5.1 MMOL/L
PROT SERPL-MCNC: 6.1 G/DL
RBC # BLD: 4.33 M/UL
RBC # FLD: 13.5 %
SODIUM SERPL-SCNC: 143 MMOL/L
TRIGL SERPL-MCNC: 116 MG/DL
TSH SERPL-ACNC: 1.72 UIU/ML
URATE SERPL-MCNC: 6.4 MG/DL
WBC # FLD AUTO: 6.45 K/UL

## 2019-04-17 PROCEDURE — G0439: CPT

## 2019-04-17 PROCEDURE — 99214 OFFICE O/P EST MOD 30 MIN: CPT | Mod: 25

## 2019-04-17 PROCEDURE — 76536 US EXAM OF HEAD AND NECK: CPT

## 2019-04-17 PROCEDURE — G0444 DEPRESSION SCREEN ANNUAL: CPT | Mod: 59

## 2019-04-17 PROCEDURE — 76536 US EXAM OF HEAD AND NECK: CPT | Mod: 26

## 2019-04-17 PROCEDURE — 93000 ELECTROCARDIOGRAM COMPLETE: CPT | Mod: 59

## 2019-04-17 RX ORDER — AZITHROMYCIN 250 MG/1
250 TABLET, FILM COATED ORAL
Qty: 1 | Refills: 0 | Status: DISCONTINUED | COMMUNITY
Start: 2018-10-24 | End: 2019-04-17

## 2019-04-17 RX ORDER — ALBUTEROL SULFATE 90 UG/1
108 (90 BASE) AEROSOL, METERED RESPIRATORY (INHALATION) EVERY 4 HOURS
Qty: 1 | Refills: 0 | Status: DISCONTINUED | COMMUNITY
Start: 2018-10-24 | End: 2019-04-17

## 2019-04-17 RX ORDER — DOXYCYCLINE HYCLATE 100 MG/1
100 CAPSULE ORAL TWICE DAILY
Qty: 8 | Refills: 0 | Status: DISCONTINUED | COMMUNITY
Start: 2018-11-20 | End: 2019-04-17

## 2019-04-21 PROBLEM — Z23 NEED FOR SHINGLES VACCINE: Status: RESOLVED | Noted: 2018-10-02 | Resolved: 2019-04-21

## 2019-04-21 PROBLEM — Z87.09 HISTORY OF UPPER RESPIRATORY INFECTION: Status: RESOLVED | Noted: 2018-10-18 | Resolved: 2019-04-21

## 2019-04-21 PROBLEM — R21 RASH: Status: RESOLVED | Noted: 2017-11-13 | Resolved: 2019-04-21

## 2019-04-21 NOTE — PHYSICAL EXAM
[No Acute Distress] : no acute distress [Well Nourished] : well nourished [Well Developed] : well developed [Well-Appearing] : well-appearing [Normal Voice/Communication] : normal voice/communication [Normal Sclera/Conjunctiva] : normal sclera/conjunctiva [PERRL] : pupils equal round and reactive to light [EOMI] : extraocular movements intact [Normal Outer Ear/Nose] : the outer ears and nose were normal in appearance [Normal Oropharynx] : the oropharynx was normal [Normal TMs] : both tympanic membranes were normal [No JVD] : no jugular venous distention [Supple] : supple [No Lymphadenopathy] : no lymphadenopathy [Thyroid Normal, No Nodules] : the thyroid was normal and there were no nodules present [No Respiratory Distress] : no respiratory distress  [Clear to Auscultation] : lungs were clear to auscultation bilaterally [No Accessory Muscle Use] : no accessory muscle use [Normal Percussion] : the chest was normal to percussion [Normal Rate] : normal rate  [Regular Rhythm] : with a regular rhythm [Normal S1, S2] : normal S1 and S2 [No Murmur] : no murmur heard [No Carotid Bruits] : no carotid bruits [Pedal Pulses Present] : the pedal pulses are present [No Edema] : there was no peripheral edema [Normal Appearance] : normal in appearance [No Nipple Discharge] : no nipple discharge [No Axillary Lymphadenopathy] : no axillary lymphadenopathy [Soft] : abdomen soft [Non Tender] : non-tender [Non-distended] : non-distended [No Masses] : no abdominal mass palpated [No HSM] : no HSM [Normal Bowel Sounds] : normal bowel sounds [Normal Supraclavicular Nodes] : no supraclavicular lymphadenopathy [Normal Axillary Nodes] : no axillary lymphadenopathy [Normal Posterior Cervical Nodes] : no posterior cervical lymphadenopathy [Normal Anterior Cervical Nodes] : no anterior cervical lymphadenopathy [Normal Inguinal Nodes] : no inguinal lymphadenopathy [No CVA Tenderness] : no CVA  tenderness [No Spinal Tenderness] : no spinal tenderness [No Joint Swelling] : no joint swelling [Grossly Normal Strength/Tone] : grossly normal strength/tone [Normal Gait] : normal gait [Coordination Grossly Intact] : coordination grossly intact [No Focal Deficits] : no focal deficits [Deep Tendon Reflexes (DTR)] : deep tendon reflexes were 2+ and symmetric [Speech Grossly Normal] : speech grossly normal [Memory Grossly Normal] : memory grossly normal [Normal Affect] : the affect was normal [Alert and Oriented x3] : oriented to person, place, and time [Normal Mood] : the mood was normal [Normal Insight/Judgement] : insight and judgment were intact [Acne] : no acne [de-identified] : Prominent dilated large superficial varicosity left medial thigh\par 1+ right pretibial edema which patient states is getting better glucoses hamstring pull. [de-identified] : scabbed  lesion left side of nose

## 2019-04-21 NOTE — ASSESSMENT
[FreeTextEntry1] : His recent blood work was reviewed with him and all is stable.   His blood pressure is controlled. His lipids are in good range. His renal function and proteinuria  are stable. He will continue supportive care for his right hamstring pull. He is up-to-date with all vaccinations. I am not sure if I feel a small left thyroid nodule and he was referred for a thyroid sonogram.\par He will continue with regular ophthalmology followup for his macular degeneration.\par Advanced directives  were reviewed and the patient has them in place

## 2019-04-21 NOTE — HISTORY OF PRESENT ILLNESS
[FreeTextEntry1] : Annual wellness visit\par Hypertension\par Chronic kidney disease\par Hyperlipidemia [de-identified] : He wintered in [place] and did well. The end of March he slipped and pulled his right hamstring and had an abrasion on his left knee. He shows me a bottle of Levaquin 500 mg which he states he only took one pill which was given to him from the ER. He has had no other falls.   He feels his balance is good.   His bowels are fine.   He has no cardiopulmonary complaints.   He has chronic nocturia 2-3 times a night. He is getting Mohs surgery for basal cell carcinoma on the left nose. He sees ophthalmology for his macular degeneration which has been stable. He has had no injections in his eye in a year.  He is not driving but can read and watch TV.  He wears hearing aides. He sees renal and all has been stable.  He monitors his blood pressure at home and they have been below 140/90.  He has no dysphagia or heartburn .  He has had no gout episodes.  He has no edema.  His bowels are fine.

## 2019-04-21 NOTE — REVIEW OF SYSTEMS
[Vision Problems] : vision problems [Nocturia] : nocturia [Negative] : Heme/Lymph [FreeTextEntry8] : Nocturia 2-3 times a night. His stream is fine [FreeTextEntry3] : Macula degeneration

## 2019-04-21 NOTE — HEALTH RISK ASSESSMENT
[None] : None [With Family] : lives with family [Retired] : retired [] :  [# Of Children ___] : has [unfilled] children [Sexually Active] : sexually active [Feels Safe at Home] : Feels safe at home [Fully functional (bathing, dressing, toileting, transferring, walking, feeding)] : Fully functional (bathing, dressing, toileting, transferring, walking, feeding) [Fully functional (using the telephone, shopping, preparing meals, housekeeping, doing laundry, using] : Fully functional and needs no help or supervision to perform IADLs (using the telephone, shopping, preparing meals, housekeeping, doing laundry, using transportation, managing medications and managing finances) [Reports changes in hearing] : Reports changes in hearing [Reports changes in vision] : Reports changes in vision [Smoke Detector] : smoke detector [Carbon Monoxide Detector] : carbon monoxide detector [Safety elements used in home] : safety elements used in home [Seat Belt] :  uses seat belt [Sunscreen] : uses sunscreen [Discussed at today's visit] : Advance Directives Discussed at today's visit [No changes since last discussed ___] : No changes since last discussed  [unfilled] [DNR] : DNR [DNI] : DNI [Any fall with injury in past year] : Patient reported fall with injury in the past year [0] : 2) Feeling down, depressed, or hopeless: Not at all (0) [Independent] : managing finances [Reviewed no changes] : Reviewed no changes [Designated Healthcare Proxy] : Designated healthcare proxy [Name: ___] : Health Care Proxy's Name: [unfilled]  [Relationship: ___] : Relationship: [unfilled] [I will adhere to the patient's wishes as expressed in the advance directive except as noted below.] : I will adhere to the patient's wishes as expressed in the advance directive except as noted below [de-identified] : rare [] : No [de-identified] : walks 45 minutes a day [[id number]] : 0 [de-identified] : watches salt in diet [Change in mental status noted] : No change in mental status noted [Language] : denies difficulty with language [Behavior] : denies difficulty with behavior [Learning/Retaining New Information] : denies difficulty learning/retaining new information [Handling Complex Tasks] : denies difficulty handling complex tasks [Reasoning] : denies difficulty with reasoning [Spatial Ability and Orientation] : denies difficulty with spatial ability and orientation [High Risk Behavior] : no high risk behavior [Reports changes in dental health] : Reports no changes in dental health [Guns at Home] : no guns at home [de-identified] : Independent except not driving [de-identified] : Grab bars [de-identified] : Hearing aid [de-identified] : Macular degeneration [FreeTextEntry4] : Patient states he has discussed his wishes with his healthcare agent and requests no aggressive treatment Should he have a terminal illness [AdvancecareDate] : 04/2019

## 2019-04-26 ENCOUNTER — APPOINTMENT (OUTPATIENT)
Dept: INTERNAL MEDICINE | Facility: CLINIC | Age: 84
End: 2019-04-26
Payer: MEDICARE

## 2019-04-26 PROCEDURE — 36415 COLL VENOUS BLD VENIPUNCTURE: CPT

## 2019-04-27 LAB — CK SERPL-CCNC: 75 U/L

## 2019-06-28 ENCOUNTER — OTHER (OUTPATIENT)
Age: 84
End: 2019-06-28

## 2019-07-21 ENCOUNTER — TRANSCRIPTION ENCOUNTER (OUTPATIENT)
Age: 84
End: 2019-07-21

## 2019-07-23 ENCOUNTER — RX RENEWAL (OUTPATIENT)
Age: 84
End: 2019-07-23

## 2019-07-26 ENCOUNTER — APPOINTMENT (OUTPATIENT)
Dept: INTERNAL MEDICINE | Facility: CLINIC | Age: 84
End: 2019-07-26
Payer: MEDICARE

## 2019-07-26 VITALS
WEIGHT: 155 LBS | SYSTOLIC BLOOD PRESSURE: 130 MMHG | DIASTOLIC BLOOD PRESSURE: 60 MMHG | TEMPERATURE: 97.6 F | BODY MASS INDEX: 26.46 KG/M2 | HEART RATE: 52 BPM | HEIGHT: 64 IN | OXYGEN SATURATION: 97 %

## 2019-07-26 DIAGNOSIS — S70.11XS CONTUSION OF RIGHT THIGH, SEQUELA: ICD-10-CM

## 2019-07-26 DIAGNOSIS — S70.11XA CONTUSION OF RIGHT THIGH, INITIAL ENCOUNTER: ICD-10-CM

## 2019-07-26 PROCEDURE — 36415 COLL VENOUS BLD VENIPUNCTURE: CPT

## 2019-07-26 PROCEDURE — 99214 OFFICE O/P EST MOD 30 MIN: CPT | Mod: 25

## 2019-07-27 PROBLEM — S70.11XS: Status: RESOLVED | Noted: 2019-04-21 | Resolved: 2019-07-27

## 2019-07-27 PROBLEM — S70.11XA CONTUSION OF RIGHT THIGH, INITIAL ENCOUNTER: Status: RESOLVED | Noted: 2019-04-01 | Resolved: 2019-07-27

## 2019-07-27 NOTE — PHYSICAL EXAM
[No Acute Distress] : no acute distress [Well Nourished] : well nourished [Well Developed] : well developed [Well-Appearing] : well-appearing [Normal Sclera/Conjunctiva] : normal sclera/conjunctiva [PERRL] : pupils equal round and reactive to light [EOMI] : extraocular movements intact [Normal Outer Ear/Nose] : the outer ears and nose were normal in appearance [Normal Oropharynx] : the oropharynx was normal [No JVD] : no jugular venous distention [No Lymphadenopathy] : no lymphadenopathy [Supple] : supple [Thyroid Normal, No Nodules] : the thyroid was normal and there were no nodules present [No Respiratory Distress] : no respiratory distress  [No Accessory Muscle Use] : no accessory muscle use [Clear to Auscultation] : lungs were clear to auscultation bilaterally [Normal Rate] : normal rate  [Regular Rhythm] : with a regular rhythm [Normal S1, S2] : normal S1 and S2 [No Murmur] : no murmur heard [Normal] : normal rate, regular rhythm, normal S1 and S2 and no murmur heard [No Carotid Bruits] : no carotid bruits [No Abdominal Bruit] : a ~M bruit was not heard ~T in the abdomen [No Varicosities] : no varicosities [Pedal Pulses Present] : the pedal pulses are present [No Edema] : there was no peripheral edema [No Palpable Aorta] : no palpable aorta [No Extremity Clubbing/Cyanosis] : no extremity clubbing/cyanosis [Soft] : abdomen soft [Non Tender] : non-tender [Non-distended] : non-distended [No Masses] : no abdominal mass palpated [No HSM] : no HSM [Normal Bowel Sounds] : normal bowel sounds [Normal Posterior Cervical Nodes] : no posterior cervical lymphadenopathy [Normal Anterior Cervical Nodes] : no anterior cervical lymphadenopathy [No CVA Tenderness] : no CVA  tenderness [No Spinal Tenderness] : no spinal tenderness [Coordination Grossly Intact] : coordination grossly intact [Speech Grossly Normal] : speech grossly normal [Normal Affect] : the affect was normal [Normal Mood] : the mood was normal [Normal Insight/Judgement] : insight and judgment were intact [Normal Voice/Communication] : normal voice/communication [Normal Supraclavicular Nodes] : no supraclavicular lymphadenopathy [Normal Axillary Nodes] : no axillary lymphadenopathy [Normal Inguinal Nodes] : no inguinal lymphadenopathy [Memory Grossly Normal] : memory grossly normal [Alert and Oriented x3] : oriented to person, place, and time [de-identified] : 5 sutures were removed from the left elbow. The wound looked clean. Distal to this there was an open superficial abrasion. There is a small abrasion on the left elbow. Extensive seborrheic dermatitis of face

## 2019-07-27 NOTE — ASSESSMENT
[FreeTextEntry1] : 5 sutures were removed from the right elbow. We discussed wound care and dressing for the next week or 2.\par He was given ketoconazole cream for the seborrheic dermatitis of his face\par Regarding his muscle cramps and not feeling himself blood work was sent.  His blood pressure remains controlled.

## 2019-07-27 NOTE — HISTORY OF PRESENT ILLNESS
[FreeTextEntry8] : He tripped and fell in his garage at home and sustained a laceration to his right elbow on July 21.   He had 5 stitches placed he got a tetanus shot in urgent care.  He is here now to get the stitches removed. He also has  an open  abrasion on his right elbow and his wife is applying Silvadene cream.  . He was getting problems with cramps of his fingers and his wife gave him some quinine water and this is better. He also spoke to nephrology who cut back his hydralazine to 50 mg t.i.d.\par His wife feels he is not the same since Prolia injection several months ago.   He is more tired. He had diarrhea for 4 days which stopped. She doesn't feel he should receive Prolia  again in December

## 2019-07-30 LAB
ALBUMIN SERPL ELPH-MCNC: 4.3 G/DL
ALP BLD-CCNC: 69 U/L
ALT SERPL-CCNC: 26 U/L
ANION GAP SERPL CALC-SCNC: 12 MMOL/L
AST SERPL-CCNC: 27 U/L
BASOPHILS # BLD AUTO: 0.03 K/UL
BASOPHILS NFR BLD AUTO: 0.5 %
BILIRUB SERPL-MCNC: 0.4 MG/DL
BUN SERPL-MCNC: 41 MG/DL
CALCIUM SERPL-MCNC: 9 MG/DL
CHLORIDE SERPL-SCNC: 109 MMOL/L
CHOLEST SERPL-MCNC: 95 MG/DL
CHOLEST/HDLC SERPL: 3.1 RATIO
CO2 SERPL-SCNC: 18 MMOL/L
CREAT SERPL-MCNC: 2.66 MG/DL
EOSINOPHIL # BLD AUTO: 0.19 K/UL
EOSINOPHIL NFR BLD AUTO: 3.2 %
GLUCOSE SERPL-MCNC: 91 MG/DL
HCT VFR BLD CALC: 45.9 %
HDLC SERPL-MCNC: 31 MG/DL
HGB BLD-MCNC: 15 G/DL
IMM GRANULOCYTES NFR BLD AUTO: 0.5 %
LDLC SERPL CALC-MCNC: 35 MG/DL
LYMPHOCYTES # BLD AUTO: 1.24 K/UL
LYMPHOCYTES NFR BLD AUTO: 20.7 %
MAGNESIUM SERPL-MCNC: 2.6 MG/DL
MAN DIFF?: NORMAL
MCHC RBC-ENTMCNC: 30.8 PG
MCHC RBC-ENTMCNC: 32.7 GM/DL
MCV RBC AUTO: 94.3 FL
MONOCYTES # BLD AUTO: 0.5 K/UL
MONOCYTES NFR BLD AUTO: 8.4 %
NEUTROPHILS # BLD AUTO: 3.99 K/UL
NEUTROPHILS NFR BLD AUTO: 66.7 %
PLATELET # BLD AUTO: 201 K/UL
POTASSIUM SERPL-SCNC: 5.5 MMOL/L
PROT SERPL-MCNC: 6.7 G/DL
RBC # BLD: 4.87 M/UL
RBC # FLD: 13.6 %
SODIUM SERPL-SCNC: 139 MMOL/L
T4 FREE SERPL-MCNC: 1.5 NG/DL
TRIGL SERPL-MCNC: 145 MG/DL
TSH SERPL-ACNC: 2.77 UIU/ML
WBC # FLD AUTO: 5.98 K/UL

## 2019-08-05 ENCOUNTER — MEDICATION RENEWAL (OUTPATIENT)
Age: 84
End: 2019-08-05

## 2019-08-05 ENCOUNTER — APPOINTMENT (OUTPATIENT)
Dept: NEPHROLOGY | Facility: CLINIC | Age: 84
End: 2019-08-05
Payer: MEDICARE

## 2019-08-05 VITALS
WEIGHT: 156 LBS | DIASTOLIC BLOOD PRESSURE: 64 MMHG | BODY MASS INDEX: 26.63 KG/M2 | HEIGHT: 64 IN | HEART RATE: 60 BPM | SYSTOLIC BLOOD PRESSURE: 140 MMHG

## 2019-08-05 PROCEDURE — 99214 OFFICE O/P EST MOD 30 MIN: CPT | Mod: 25

## 2019-08-05 PROCEDURE — 36415 COLL VENOUS BLD VENIPUNCTURE: CPT

## 2019-08-05 RX ORDER — SULFAMETHOXAZOLE AND TRIMETHOPRIM 800; 160 MG/1; MG/1
800-160 TABLET ORAL
Refills: 0 | Status: DISCONTINUED | COMMUNITY
End: 2019-08-05

## 2019-08-05 NOTE — ASSESSMENT
[FreeTextEntry1] : HTN: BP acceptable\par Keep current doses of amlodipine, diovan, hydralazine along with all other meds\par YESSICA on CKD-3: in the setting of bactrim and diovan. Now off bactrim and diovan half dose\par Hyperkalemia: keep on low K diet\par Trend K and stay off orange juice

## 2019-08-05 NOTE — PHYSICAL EXAM
[General Appearance - Alert] : alert [General Appearance - In No Acute Distress] : in no acute distress [Sclera] : the sclera and conjunctiva were normal [Outer Ear] : the ears and nose were normal in appearance [Neck Appearance] : the appearance of the neck was normal [Auscultation Breath Sounds / Voice Sounds] : lungs were clear to auscultation bilaterally [Heart Rate And Rhythm] : heart rate was normal and rhythm regular [Heart Sounds] : normal S1 and S2 [Heart Sounds Pericardial Friction Rub] : no pericardial rub [FreeTextEntry1] : trace edema  [Bowel Sounds] : normal bowel sounds [Abdomen Soft] : soft [Abdomen Tenderness] : non-tender [No CVA Tenderness] : no ~M costovertebral angle tenderness [] : no rash [No Focal Deficits] : no focal deficits [Oriented To Time, Place, And Person] : oriented to person, place, and time [Affect] : the affect was normal [Mood] : the mood was normal

## 2019-08-05 NOTE — HISTORY OF PRESENT ILLNESS
[FreeTextEntry1] : 88 yo male here for CKD3 HTN here on an acute visit for YESSICA and hyperkalemia\par Apparently pt scraped his right arm and went to [facility] and was given Bactrim to take\par He was on it for 6 days and had labs done showed Cr of 2.6 and K 5.5\par He was advised by me to stop the bactrim and decrease the diovan to half and come in today\par He feels overall better\par \par

## 2019-08-06 ENCOUNTER — CHART COPY (OUTPATIENT)
Age: 84
End: 2019-08-06

## 2019-08-06 LAB
ALBUMIN SERPL ELPH-MCNC: 4.3 G/DL
ANION GAP SERPL CALC-SCNC: 12 MMOL/L
BUN SERPL-MCNC: 34 MG/DL
CALCIUM SERPL-MCNC: 9.2 MG/DL
CHLORIDE SERPL-SCNC: 107 MMOL/L
CO2 SERPL-SCNC: 21 MMOL/L
CREAT SERPL-MCNC: 1.97 MG/DL
GLUCOSE SERPL-MCNC: 96 MG/DL
PHOSPHATE SERPL-MCNC: 2.9 MG/DL
POTASSIUM SERPL-SCNC: 5.6 MMOL/L
SODIUM SERPL-SCNC: 140 MMOL/L
URATE SERPL-MCNC: 5.8 MG/DL

## 2019-09-05 LAB
ALBUMIN SERPL ELPH-MCNC: 4.2 G/DL
ANION GAP SERPL CALC-SCNC: 11 MMOL/L
BUN SERPL-MCNC: 31 MG/DL
CALCIUM SERPL-MCNC: 9.3 MG/DL
CHLORIDE SERPL-SCNC: 107 MMOL/L
CO2 SERPL-SCNC: 23 MMOL/L
CREAT SERPL-MCNC: 1.77 MG/DL
GLUCOSE SERPL-MCNC: 92 MG/DL
PHOSPHATE SERPL-MCNC: 2.7 MG/DL
POTASSIUM SERPL-SCNC: 4.8 MMOL/L
SODIUM SERPL-SCNC: 141 MMOL/L

## 2019-09-23 ENCOUNTER — OTHER (OUTPATIENT)
Age: 84
End: 2019-09-23

## 2019-10-15 ENCOUNTER — OTHER (OUTPATIENT)
Age: 84
End: 2019-10-15

## 2019-10-15 ENCOUNTER — APPOINTMENT (OUTPATIENT)
Dept: INTERNAL MEDICINE | Facility: CLINIC | Age: 84
End: 2019-10-15
Payer: MEDICARE

## 2019-10-15 PROCEDURE — G0008: CPT

## 2019-10-15 PROCEDURE — 90653 IIV ADJUVANT VACCINE IM: CPT

## 2019-10-16 LAB
25(OH)D3 SERPL-MCNC: 28.1 NG/ML
ALBUMIN SERPL ELPH-MCNC: 4.4 G/DL
ANION GAP SERPL CALC-SCNC: 15 MMOL/L
BASOPHILS # BLD AUTO: 0.05 K/UL
BASOPHILS NFR BLD AUTO: 0.9 %
BUN SERPL-MCNC: 27 MG/DL
CALCIUM SERPL-MCNC: 9.5 MG/DL
CALCIUM SERPL-MCNC: 9.5 MG/DL
CHLORIDE SERPL-SCNC: 107 MMOL/L
CO2 SERPL-SCNC: 21 MMOL/L
CREAT SERPL-MCNC: 1.75 MG/DL
EOSINOPHIL # BLD AUTO: 0.17 K/UL
EOSINOPHIL NFR BLD AUTO: 2.9 %
GLUCOSE SERPL-MCNC: 97 MG/DL
HCT VFR BLD CALC: 42.4 %
HGB BLD-MCNC: 14.1 G/DL
IMM GRANULOCYTES NFR BLD AUTO: 0.3 %
LYMPHOCYTES # BLD AUTO: 0.86 K/UL
LYMPHOCYTES NFR BLD AUTO: 14.7 %
MAN DIFF?: NORMAL
MCHC RBC-ENTMCNC: 31.1 PG
MCHC RBC-ENTMCNC: 33.3 GM/DL
MCV RBC AUTO: 93.4 FL
MONOCYTES # BLD AUTO: 0.59 K/UL
MONOCYTES NFR BLD AUTO: 10.1 %
NEUTROPHILS # BLD AUTO: 4.15 K/UL
NEUTROPHILS NFR BLD AUTO: 71.1 %
PARATHYROID HORMONE INTACT: 258 PG/ML
PHOSPHATE SERPL-MCNC: 2.8 MG/DL
PLATELET # BLD AUTO: 190 K/UL
POTASSIUM SERPL-SCNC: 4.7 MMOL/L
RBC # BLD: 4.54 M/UL
RBC # FLD: 13.8 %
SODIUM SERPL-SCNC: 143 MMOL/L
URATE SERPL-MCNC: 6.7 MG/DL
WBC # FLD AUTO: 5.84 K/UL

## 2019-10-18 ENCOUNTER — APPOINTMENT (OUTPATIENT)
Dept: NEPHROLOGY | Facility: CLINIC | Age: 84
End: 2019-10-18
Payer: MEDICARE

## 2019-10-18 VITALS
WEIGHT: 158.73 LBS | SYSTOLIC BLOOD PRESSURE: 150 MMHG | DIASTOLIC BLOOD PRESSURE: 60 MMHG | BODY MASS INDEX: 27.1 KG/M2 | HEIGHT: 64 IN

## 2019-10-18 PROCEDURE — 99214 OFFICE O/P EST MOD 30 MIN: CPT

## 2019-10-18 NOTE — HISTORY OF PRESENT ILLNESS
[FreeTextEntry1] : 89 yo male here for CKD3 HTN here for follow-up\par Doing well.  Will be going to [place] in January for the winter.\par

## 2019-10-18 NOTE — ASSESSMENT
[FreeTextEntry1] : CKD 3: Renal function back to baseline.\par Hyperkalemia: Stable.  Keep on low K diet\par HTN: BP mildly elevated.\par Keep current doses of amlodipine, diovan back to the usual dose twice daily, hydralazine along with all other meds\par Secondary hyperparathyroidism: Continue vitamin D over-the-counter and continue calcitriol 4 times a week.\par Follow-up in 6 months

## 2019-10-18 NOTE — PHYSICAL EXAM
[General Appearance - Alert] : alert [General Appearance - In No Acute Distress] : in no acute distress [Sclera] : the sclera and conjunctiva were normal [Neck Appearance] : the appearance of the neck was normal [Outer Ear] : the ears and nose were normal in appearance [Heart Rate And Rhythm] : heart rate was normal and rhythm regular [Auscultation Breath Sounds / Voice Sounds] : lungs were clear to auscultation bilaterally [Heart Sounds Pericardial Friction Rub] : no pericardial rub [FreeTextEntry1] : trace edema  [Heart Sounds] : normal S1 and S2 [Bowel Sounds] : normal bowel sounds [Abdomen Tenderness] : non-tender [Abdomen Soft] : soft [No CVA Tenderness] : no ~M costovertebral angle tenderness [] : no rash [Affect] : the affect was normal [No Focal Deficits] : no focal deficits [Oriented To Time, Place, And Person] : oriented to person, place, and time [Mood] : the mood was normal

## 2019-11-18 ENCOUNTER — MEDICATION RENEWAL (OUTPATIENT)
Age: 84
End: 2019-11-18

## 2019-11-23 ENCOUNTER — RX RENEWAL (OUTPATIENT)
Age: 84
End: 2019-11-23

## 2019-11-24 ENCOUNTER — RX RENEWAL (OUTPATIENT)
Age: 84
End: 2019-11-24

## 2019-12-03 ENCOUNTER — APPOINTMENT (OUTPATIENT)
Dept: INTERNAL MEDICINE | Facility: CLINIC | Age: 84
End: 2019-12-03
Payer: MEDICARE

## 2019-12-03 VITALS
HEART RATE: 61 BPM | WEIGHT: 158 LBS | SYSTOLIC BLOOD PRESSURE: 140 MMHG | DIASTOLIC BLOOD PRESSURE: 66 MMHG | BODY MASS INDEX: 26.98 KG/M2 | HEIGHT: 64 IN | OXYGEN SATURATION: 98 % | TEMPERATURE: 98.1 F

## 2019-12-03 PROCEDURE — 99214 OFFICE O/P EST MOD 30 MIN: CPT

## 2019-12-03 NOTE — REVIEW OF SYSTEMS
[Vision Problems] : no vision problems [Fever] : no fever [Nasal Discharge] : no nasal discharge [Chest Pain] : no chest pain [Skin Rash] : no skin rash [Shortness Of Breath] : no shortness of breath [Headache] : no headache

## 2019-12-03 NOTE — HISTORY OF PRESENT ILLNESS
[FreeTextEntry8] : HAYDEE STRELISKER with hypertension, hypercholesterolemia, CRI here for cough for the past week.  Was sick last week.  Relative gave zpak. COugh has now improved.  Denies fever, chills, cough.  Feeling well\par \par Hypertension, hypercholesterolemia stable on current meds\par \par Sees Dr RADHA Tolbert for CRI\par \par Seen with wife today

## 2019-12-03 NOTE — PHYSICAL EXAM
[No Acute Distress] : no acute distress [Well Developed] : well developed [Normal Sclera/Conjunctiva] : normal sclera/conjunctiva [Well Nourished] : well nourished [No JVD] : no jugular venous distention [Normal Outer Ear/Nose] : the outer ears and nose were normal in appearance [Normal Oropharynx] : the oropharynx was normal [Supple] : supple [No Lymphadenopathy] : no lymphadenopathy [Clear to Auscultation] : lungs were clear to auscultation bilaterally [No Accessory Muscle Use] : no accessory muscle use [No Respiratory Distress] : no respiratory distress  [Regular Rhythm] : with a regular rhythm [Normal Rate] : normal rate  [Normal Gait] : normal gait [Alert and Oriented x3] : oriented to person, place, and time [Normal S1, S2] : normal S1 and S2

## 2019-12-09 ENCOUNTER — RX RENEWAL (OUTPATIENT)
Age: 84
End: 2019-12-09

## 2019-12-26 ENCOUNTER — CHART COPY (OUTPATIENT)
Age: 84
End: 2019-12-26

## 2019-12-30 ENCOUNTER — APPOINTMENT (OUTPATIENT)
Dept: INTERNAL MEDICINE | Facility: CLINIC | Age: 84
End: 2019-12-30
Payer: MEDICARE

## 2019-12-30 VITALS
SYSTOLIC BLOOD PRESSURE: 124 MMHG | WEIGHT: 164 LBS | DIASTOLIC BLOOD PRESSURE: 72 MMHG | TEMPERATURE: 98.3 F | OXYGEN SATURATION: 96 % | HEIGHT: 64 IN | BODY MASS INDEX: 28 KG/M2 | HEART RATE: 62 BPM

## 2019-12-30 PROCEDURE — 99214 OFFICE O/P EST MOD 30 MIN: CPT

## 2019-12-30 NOTE — HISTORY OF PRESENT ILLNESS
[FreeTextEntry8] : STANLEY STRELISKER is a 89 yo man here for injury to right forearm.  The patient reports that he was walking on his deck, slipped and injured his right forearm.  Has quarter sized skin tear.  Denies fever or spreading redness.  Feels well.  No LOC or lightheadedness.  No other injuries\par \par Hypertension, hypercholesterolemia stable on current meds\par \par The patient is seen with his wife today

## 2019-12-30 NOTE — PHYSICAL EXAM
[Well Nourished] : well nourished [No Acute Distress] : no acute distress [Well-Appearing] : well-appearing [Well Developed] : well developed [Normal Oropharynx] : the oropharynx was normal [Normal Sclera/Conjunctiva] : normal sclera/conjunctiva [Normal Outer Ear/Nose] : the outer ears and nose were normal in appearance [Normal TMs] : both tympanic membranes were normal [No JVD] : no jugular venous distention [No Respiratory Distress] : no respiratory distress  [No Accessory Muscle Use] : no accessory muscle use [Clear to Auscultation] : lungs were clear to auscultation bilaterally [Normal Rate] : normal rate  [Regular Rhythm] : with a regular rhythm [de-identified] : right forearm with quarter sized superficial skin tear.  No purulent discharge [Normal S1, S2] : normal S1 and S2

## 2019-12-30 NOTE — REVIEW OF SYSTEMS
[Fever] : no fever [Nasal Discharge] : no nasal discharge [Vision Problems] : no vision problems [Chest Pain] : no chest pain [Shortness Of Breath] : no shortness of breath [Headache] : no headache

## 2020-01-07 ENCOUNTER — MEDICATION RENEWAL (OUTPATIENT)
Age: 85
End: 2020-01-07

## 2020-03-14 ENCOUNTER — APPOINTMENT (OUTPATIENT)
Dept: INTERNAL MEDICINE | Facility: CLINIC | Age: 85
End: 2020-03-14
Payer: MEDICARE

## 2020-03-14 VITALS
TEMPERATURE: 98.8 F | DIASTOLIC BLOOD PRESSURE: 66 MMHG | HEIGHT: 64 IN | HEART RATE: 61 BPM | BODY MASS INDEX: 28 KG/M2 | WEIGHT: 164 LBS | OXYGEN SATURATION: 96 % | SYSTOLIC BLOOD PRESSURE: 166 MMHG

## 2020-03-14 VITALS — SYSTOLIC BLOOD PRESSURE: 148 MMHG | DIASTOLIC BLOOD PRESSURE: 68 MMHG

## 2020-03-14 LAB
ALBUMIN SERPL ELPH-MCNC: 4.4 G/DL
ALP BLD-CCNC: 67 U/L
ALT SERPL-CCNC: 26 U/L
ANION GAP SERPL CALC-SCNC: 15 MMOL/L
AST SERPL-CCNC: 28 U/L
BASOPHILS # BLD AUTO: 0.06 K/UL
BASOPHILS NFR BLD AUTO: 1 %
BILIRUB SERPL-MCNC: 0.4 MG/DL
BUN SERPL-MCNC: 32 MG/DL
CALCIUM SERPL-MCNC: 10 MG/DL
CHLORIDE SERPL-SCNC: 103 MMOL/L
CO2 SERPL-SCNC: 22 MMOL/L
CREAT SERPL-MCNC: 1.85 MG/DL
EOSINOPHIL # BLD AUTO: 0.22 K/UL
EOSINOPHIL NFR BLD AUTO: 3.5 %
GLUCOSE SERPL-MCNC: 107 MG/DL
HCT VFR BLD CALC: 44.5 %
HGB BLD-MCNC: 15.1 G/DL
IMM GRANULOCYTES NFR BLD AUTO: 0.2 %
LYMPHOCYTES # BLD AUTO: 0.96 K/UL
LYMPHOCYTES NFR BLD AUTO: 15.2 %
MAN DIFF?: NORMAL
MCHC RBC-ENTMCNC: 31.1 PG
MCHC RBC-ENTMCNC: 33.9 GM/DL
MCV RBC AUTO: 91.6 FL
MONOCYTES # BLD AUTO: 0.66 K/UL
MONOCYTES NFR BLD AUTO: 10.5 %
NEUTROPHILS # BLD AUTO: 4.4 K/UL
NEUTROPHILS NFR BLD AUTO: 69.6 %
PLATELET # BLD AUTO: 196 K/UL
POTASSIUM SERPL-SCNC: 4.2 MMOL/L
PROT SERPL-MCNC: 7.1 G/DL
RBC # BLD: 4.86 M/UL
RBC # FLD: 13.2 %
SODIUM SERPL-SCNC: 140 MMOL/L
WBC # FLD AUTO: 6.31 K/UL

## 2020-03-14 PROCEDURE — 99214 OFFICE O/P EST MOD 30 MIN: CPT | Mod: 25

## 2020-03-14 PROCEDURE — 36415 COLL VENOUS BLD VENIPUNCTURE: CPT

## 2020-03-14 NOTE — PHYSICAL EXAM
[Normal] : soft, non-tender, non-distended, no masses palpated, no HSM and normal bowel sounds [Normal Sphincter Tone] : normal sphincter tone [No Mass] : no mass [Stool Occult Blood] : stool negative for occult blood [FreeTextEntry1] : Stool from rectal exam guaiac negative, and stool from patient's specimen was guaiac negative.  There is residue from bright red blood in the specimen bag which is separate from the stool. [de-identified] : Erythema in the inner buttock, 1cm from the rectum and near perinum.  No sores or fissures visualized.

## 2020-03-14 NOTE — ASSESSMENT
[FreeTextEntry1] : Patient likely with a fungal rash in the buttock / perineal area, and will give clotrimazole cream for this.  The stool itself is guaiac negative, although there is BRB when patient wipes.  Patient may have bleeding from a sore or possibly a fissure.  Will check a CBC / CMP, and patient given a referral to colon & rectal surgery for further examination of the rectum

## 2020-03-14 NOTE — PHYSICAL EXAM
[Normal] : soft, non-tender, non-distended, no masses palpated, no HSM and normal bowel sounds [Normal Sphincter Tone] : normal sphincter tone [No Mass] : no mass [Stool Occult Blood] : stool negative for occult blood [FreeTextEntry1] : Stool from rectal exam guaiac negative, and stool from patient's specimen was guaiac negative.  There is residue from bright red blood in the specimen bag which is separate from the stool. [de-identified] : Erythema in the inner buttock, 1cm from the rectum and near perinum.  No sores or fissures visualized.

## 2020-03-14 NOTE — ADDENDUM
[FreeTextEntry1] : 3/14/2020 - HgB 15.1 - stable.  CMP - creat 1.85 stable.  Left message for patient at 5:50PM.

## 2020-03-14 NOTE — HISTORY OF PRESENT ILLNESS
[FreeTextEntry8] : Patient walked into the office without an appointment and told staff that he brought a stool specimen with blood in it.  Patient was asked to be seen for acute appointment.  Patient had a rash on the inner buttock cheeks bilaterally starting 2 weeks ago.  His spouse gave him Aquaphor.  The rash is not itchy or painful, or bleeding.  No fevers, chills, diarrhea, constipation / straining, abdominal pain, nausea, or vomiting.  He noticed blood in the stool yesterday and again today.  He is color blind, but his spouse agreed that it was blood.  No dizziness, lightheadedness, chest pain, or dyspnea.

## 2020-04-27 ENCOUNTER — NON-APPOINTMENT (OUTPATIENT)
Age: 85
End: 2020-04-27

## 2020-04-27 ENCOUNTER — APPOINTMENT (OUTPATIENT)
Dept: INTERNAL MEDICINE | Facility: CLINIC | Age: 85
End: 2020-04-27
Payer: MEDICARE

## 2020-04-27 ENCOUNTER — LABORATORY RESULT (OUTPATIENT)
Age: 85
End: 2020-04-27

## 2020-04-27 ENCOUNTER — APPOINTMENT (OUTPATIENT)
Dept: COLORECTAL SURGERY | Facility: CLINIC | Age: 85
End: 2020-04-27
Payer: MEDICARE

## 2020-04-27 VITALS
SYSTOLIC BLOOD PRESSURE: 176 MMHG | HEIGHT: 65 IN | DIASTOLIC BLOOD PRESSURE: 72 MMHG | WEIGHT: 160 LBS | BODY MASS INDEX: 26.66 KG/M2 | HEART RATE: 60 BPM | RESPIRATION RATE: 14 BRPM

## 2020-04-27 VITALS
WEIGHT: 158 LBS | SYSTOLIC BLOOD PRESSURE: 138 MMHG | BODY MASS INDEX: 26.98 KG/M2 | TEMPERATURE: 97.7 F | HEIGHT: 64 IN | DIASTOLIC BLOOD PRESSURE: 66 MMHG | HEART RATE: 56 BPM | OXYGEN SATURATION: 97 %

## 2020-04-27 VITALS — DIASTOLIC BLOOD PRESSURE: 70 MMHG | SYSTOLIC BLOOD PRESSURE: 140 MMHG

## 2020-04-27 DIAGNOSIS — J06.9 ACUTE UPPER RESPIRATORY INFECTION, UNSPECIFIED: ICD-10-CM

## 2020-04-27 DIAGNOSIS — R25.2 CRAMP AND SPASM: ICD-10-CM

## 2020-04-27 DIAGNOSIS — M85.80 OTHER SPECIFIED DISORDERS OF BONE DENSITY AND STRUCTURE, UNSPECIFIED SITE: ICD-10-CM

## 2020-04-27 DIAGNOSIS — S51.019S: ICD-10-CM

## 2020-04-27 DIAGNOSIS — R53.83 OTHER FATIGUE: ICD-10-CM

## 2020-04-27 DIAGNOSIS — S50.311A ABRASION OF RIGHT ELBOW, INITIAL ENCOUNTER: ICD-10-CM

## 2020-04-27 DIAGNOSIS — R94.6 ABNORMAL RESULTS OF THYROID FUNCTION STUDIES: ICD-10-CM

## 2020-04-27 PROCEDURE — 45300 PROCTOSIGMOIDOSCOPY DX: CPT

## 2020-04-27 PROCEDURE — 99203 OFFICE O/P NEW LOW 30 MIN: CPT

## 2020-04-27 PROCEDURE — 93000 ELECTROCARDIOGRAM COMPLETE: CPT | Mod: 59

## 2020-04-27 PROCEDURE — G0444 DEPRESSION SCREEN ANNUAL: CPT | Mod: 59

## 2020-04-27 PROCEDURE — G0439: CPT

## 2020-04-27 PROCEDURE — 99497 ADVNCD CARE PLAN 30 MIN: CPT | Mod: 33

## 2020-04-27 PROCEDURE — 36415 COLL VENOUS BLD VENIPUNCTURE: CPT

## 2020-04-27 PROCEDURE — 99214 OFFICE O/P EST MOD 30 MIN: CPT | Mod: 25

## 2020-04-27 RX ORDER — SILVER SULFADIAZINE 10 MG/G
1 CREAM TOPICAL
Refills: 0 | Status: DISCONTINUED | COMMUNITY
End: 2020-04-27

## 2020-04-27 NOTE — PHYSICAL EXAM
[Normal Breath Sounds] : Normal breath sounds [Normal Heart Sounds] : normal heart sounds [Normal Rate and Rhythm] : normal rate and rhythm [No Edema] : No edema [Alert] : alert [Oriented to Person] : oriented to person [Oriented to Place] : oriented to place [Oriented to Time] : oriented to time [Calm] : calm [de-identified] : round soft +BS NT/ND [de-identified] : +ROM [de-identified] : NC/AT [de-identified] : intact

## 2020-04-27 NOTE — REVIEW OF SYSTEMS
[Loss Of Hearing] : hearing loss [As Noted in HPI] : as noted in HPI [Negative] : Endocrine [Chest Pain] : no chest pain [Lower Ext Edema] : no extremity edema [Easy Bleeding] : no tendency for easy bleeding [Shortness Of Breath] : no shortness of breath [Easy Bruising] : no tendency for easy bruising [FreeTextEntry4] : bilat hearing aid [FreeTextEntry8] : nocturia x2

## 2020-04-27 NOTE — ASSESSMENT
[FreeTextEntry1] : 89-year-old gentleman who presents with complaint of painless bright red blood per rectum. His last colonoscopy was years ago.\par \par Inspection of the anorectal area reveals some pink tissue prolapsing from the anus. The remainder of the perianal skin is unremarkable. On digital exam no additional mass is palpable. Sigmoidoscopy to 12 cm is unremarkable. Anoscopy confirms the presence of a fleshy-appearing growth in the right anterior position.\par \par Whether this represents a hemorrhoid or an adenomatous polyp is unclear. Due to its location, this requires excision in the outpatient surgery unit under sedation. Once the moratorium on elective surgery is lifted, I will schedule the procedure.

## 2020-04-27 NOTE — HEALTH RISK ASSESSMENT
[0] : 2) Feeling down, depressed, or hopeless: Not at all (0) [None] : None [With Family] : lives with family [Retired] : retired [Sexually Active] : sexually active [# Of Children ___] : has [unfilled] children [] :  [Feels Safe at Home] : Feels safe at home [Fully functional (using the telephone, shopping, preparing meals, housekeeping, doing laundry, using] : Fully functional and needs no help or supervision to perform IADLs (using the telephone, shopping, preparing meals, housekeeping, doing laundry, using transportation, managing medications and managing finances) [Fully functional (bathing, dressing, toileting, transferring, walking, feeding)] : Fully functional (bathing, dressing, toileting, transferring, walking, feeding) [Independent] : managing finances [Reports changes in hearing] : Reports changes in hearing [Reports changes in vision] : Reports changes in vision [Carbon Monoxide Detector] : carbon monoxide detector [Smoke Detector] : smoke detector [Sunscreen] : uses sunscreen [Safety elements used in home] : safety elements used in home [Seat Belt] :  uses seat belt [Name: ___] : Health Care Proxy's Name: [unfilled]  [Designated Healthcare Proxy] : Designated healthcare proxy [Relationship: ___] : Relationship: [unfilled] [I will adhere to the patient's wishes as expressed in the advance directive except as noted below.] : I will adhere to the patient's wishes as expressed in the advance directive except as noted below [2 - 4 times a month (2 pts)] : 2-4 times a month (2 points) [1 or 2 (0 pts)] : 1 or 2 (0 points) [Never (0 pts)] : Never (0 points) [With Patient/Caregiver] : With Patient/Caregiver [No] : In the past 12 months have you used drugs other than those required for medical reasons? No [No falls in past year] : Patient reported no falls in the past year [] : No [de-identified] : rare [de-identified] : walks 45 minutes a day [de-identified] : watches salt in diet [[id number]] : 0 [Change in mental status noted] : No change in mental status noted [Language] : denies difficulty with language [Behavior] : denies difficulty with behavior [Learning/Retaining New Information] : denies difficulty learning/retaining new information [Handling Complex Tasks] : denies difficulty handling complex tasks [Reasoning] : denies difficulty with reasoning [Spatial Ability and Orientation] : denies difficulty with spatial ability and orientation [High Risk Behavior] : no high risk behavior [Reports changes in dental health] : Reports no changes in dental health [Guns at Home] : no guns at home [BoneDensityDate] : 5/2019 [BoneDensityComments] : osteoporosis [de-identified] : Independent except not driving [de-identified] : Hearing aid [de-identified] : Macular degeneration [de-identified] : Grab bars [AdvancecareDate] : 04/2020 [FreeTextEntry4] : The purpose of a healthcare proxy agent were reviewed in depth with the patient. Options concerning life-sustaining care were reviewed. Patient was advised to discuss their wishes with their health care agent.. 16 minutes spent in discussion\par Patient states he has discussed his wishes with his healthcare agent and requests no aggressive treatment Should he have a terminal illness or an  illness from which he will not make a recovery to a good  independent good quality of life.

## 2020-04-27 NOTE — PHYSICAL EXAM
[No Joint Swelling] : no joint swelling [Grossly Normal Strength/Tone] : grossly normal strength/tone [Acne] : no acne [Well Developed] : well developed [No Acute Distress] : no acute distress [Well Nourished] : well nourished [Well-Appearing] : well-appearing [Normal Voice/Communication] : normal voice/communication [EOMI] : extraocular movements intact [PERRL] : pupils equal round and reactive to light [Normal Sclera/Conjunctiva] : normal sclera/conjunctiva [Normal Outer Ear/Nose] : the outer ears and nose were normal in appearance [Normal Oropharynx] : the oropharynx was normal [No JVD] : no jugular venous distention [Normal TMs] : both tympanic membranes were normal [Supple] : supple [No Lymphadenopathy] : no lymphadenopathy [Thyroid Normal, No Nodules] : the thyroid was normal and there were no nodules present [No Accessory Muscle Use] : no accessory muscle use [No Respiratory Distress] : no respiratory distress  [Clear to Auscultation] : lungs were clear to auscultation bilaterally [Normal Percussion] : the chest was normal to percussion [Regular Rhythm] : with a regular rhythm [Normal Rate] : normal rate  [Normal S1, S2] : normal S1 and S2 [No Murmur] : no murmur heard [Normal] : normal rate, regular rhythm, normal S1 and S2 and no murmur heard [No Carotid Bruits] : no carotid bruits [No Abdominal Bruit] : a ~M bruit was not heard ~T in the abdomen [Pedal Pulses Present] : the pedal pulses are present [No Edema] : there was no peripheral edema [No Extremity Clubbing/Cyanosis] : no extremity clubbing/cyanosis [No Palpable Aorta] : no palpable aorta [Normal Appearance] : normal in appearance [No Axillary Lymphadenopathy] : no axillary lymphadenopathy [No Nipple Discharge] : no nipple discharge [Non Tender] : non-tender [Non-distended] : non-distended [Soft] : abdomen soft [No Masses] : no abdominal mass palpated [No HSM] : no HSM [Normal Bowel Sounds] : normal bowel sounds [Normal Axillary Nodes] : no axillary lymphadenopathy [Normal Supraclavicular Nodes] : no supraclavicular lymphadenopathy [Normal Posterior Cervical Nodes] : no posterior cervical lymphadenopathy [Normal Anterior Cervical Nodes] : no anterior cervical lymphadenopathy [Normal Inguinal Nodes] : no inguinal lymphadenopathy [No CVA Tenderness] : no CVA  tenderness [No Spinal Tenderness] : no spinal tenderness [Coordination Grossly Intact] : coordination grossly intact [No Focal Deficits] : no focal deficits [Normal Gait] : normal gait [Deep Tendon Reflexes (DTR)] : deep tendon reflexes were 2+ and symmetric [Speech Grossly Normal] : speech grossly normal [Memory Grossly Normal] : memory grossly normal [Normal Affect] : the affect was normal [Normal Mood] : the mood was normal [Alert and Oriented x3] : oriented to person, place, and time [Normal Insight/Judgement] : insight and judgment were intact [Kyphosis] : no kyphosis [Scoliosis] : no scoliosis [de-identified] : TMs partially blocked by cerumen [de-identified] : Large varicosity left thigh [FreeTextEntry1] : deferred to colorectal [de-identified] : Hyperpigmentation anterior shins consistent with solar keratoses in perhaps venous stasis changes.

## 2020-04-27 NOTE — REVIEW OF SYSTEMS
[Vision Problems] : vision problems [Nocturia] : nocturia [Negative] : Heme/Lymph [Hearing Loss] : hearing loss [FreeTextEntry3] : Macula degeneration [FreeTextEntry4] : hearing aide [FreeTextEntry8] : Nocturia 2-3 times a night. His stream is fine

## 2020-04-27 NOTE — ASSESSMENT
[FreeTextEntry1] : His blood pressure appears controlled.\par He will followup with colorectal as planned today for his episodic bright red blood per rectum.\par Screening blood work was sent for his chronic kidney disease as well as hyperparathyroidism and thyroid nodules.A uric acid level will be checked his history of gout\par He was encouraged to continue with regular aerobic exercise\par He'll continue with regular nephrology followup\par He will continue to see endocrinology for his history of thyroid nodules and for his osteoporosis. He will be due for Prolia in June.\par We discussed the hyperpigmentation of his shins and that betamethasone would not help this. This is something he will need to just live with. He will followup with dermatology for regular skin cancer screening\par He is up to date with all  vaccinations\par The purpose of a healthcare proxy agent were reviewed in depth with the patient. Options concerning life-sustaining care were reviewed. Patient was advised to discuss their wishes with their health care agent. 16 minutes spent in discussion\par He'll be seen again in 6 months

## 2020-04-27 NOTE — HISTORY OF PRESENT ILLNESS
[FreeTextEntry1] : 89yo WM, appearing younger than stated age, experienced episode of painless bright red blood on toilet tissue following BM approximately six weeks ago. Seen by Dr Ramesh.  Stool tested guaiac-.  Given Rx for antifungal cream.  On occasion continues to experience BRB w/BM.  Presents for evaulation.\par \par Last colonoscopy @10yrs ago.  Denies change in bowel habits, abdominal pain. Appetite good, weight stable.

## 2020-04-27 NOTE — DATA REVIEWED
[FreeTextEntry1] : EKG reveals normal sinus rhythm poor R wave progression no change compared to prior

## 2020-04-27 NOTE — HISTORY OF PRESENT ILLNESS
[Spouse] : spouse [FreeTextEntry1] : Annual wellness visit\par Rectal bleeding\par Hypertension\par Chronic kidney disease\par Hyperlipidemia\par Osteoporosis\par Thyroid nodule [de-identified] : Since the middle of March he has been seeing episodic bright red blood on the toilet paper. He states his bowels are unchanged going every 2-3 days without straining or pain. He is on Benefiber or Metamucil. He states he feels he has a hemorrhoid  as  he can feel this when he sits down. Sometimes his stool comes out in balls. There is no thinning.  He is using clotrimazole cream prescribed by Dr. Ramesh. He is planning to see colorectal today. He has chronic nocturia x2. He has no chest pain shortness of breath or palpitations. He is living with his visual impairment due to macular degeneration which is unchanged . He can watch TV and read but he is not driving. He is otherwise independent with all activities of daily living.  He wears hearing aids.He has not fallen. He is using betamethasone cream on his anterior shins for some brown spots. This was given by dermatology\par He has had no episodes of gout

## 2020-04-29 LAB
24R-OH-CALCIDIOL SERPL-MCNC: 37.6 PG/ML
25(OH)D3 SERPL-MCNC: 34 NG/ML
ALBUMIN SERPL ELPH-MCNC: 4.3 G/DL
ALP BLD-CCNC: 61 U/L
ALT SERPL-CCNC: 19 U/L
ANION GAP SERPL CALC-SCNC: 14 MMOL/L
APPEARANCE: CLEAR
AST SERPL-CCNC: 26 U/L
BACTERIA: NEGATIVE
BASOPHILS # BLD AUTO: 0.03 K/UL
BASOPHILS NFR BLD AUTO: 0.6 %
BILIRUB SERPL-MCNC: 0.5 MG/DL
BILIRUBIN URINE: NEGATIVE
BLOOD URINE: NEGATIVE
BUN SERPL-MCNC: 29 MG/DL
CALCIUM SERPL-MCNC: 9.7 MG/DL
CALCIUM SERPL-MCNC: 9.7 MG/DL
CHLORIDE SERPL-SCNC: 103 MMOL/L
CHOLEST SERPL-MCNC: 137 MG/DL
CHOLEST/HDLC SERPL: 2.9 RATIO
CO2 SERPL-SCNC: 23 MMOL/L
COLOR: COLORLESS
CREAT SERPL-MCNC: 1.72 MG/DL
CREAT SPEC-SCNC: 21 MG/DL
CREAT/PROT UR: 2.5 RATIO
EOSINOPHIL # BLD AUTO: 0.18 K/UL
EOSINOPHIL NFR BLD AUTO: 3.4 %
GLUCOSE QUALITATIVE U: NEGATIVE
GLUCOSE SERPL-MCNC: 90 MG/DL
HCT VFR BLD CALC: 44.8 %
HDLC SERPL-MCNC: 47 MG/DL
HGB BLD-MCNC: 15 G/DL
HYALINE CASTS: 0 /LPF
IMM GRANULOCYTES NFR BLD AUTO: 0.4 %
KETONES URINE: NEGATIVE
LDLC SERPL CALC-MCNC: 62 MG/DL
LEUKOCYTE ESTERASE URINE: NEGATIVE
LYMPHOCYTES # BLD AUTO: 0.95 K/UL
LYMPHOCYTES NFR BLD AUTO: 17.7 %
MAGNESIUM SERPL-MCNC: 2.4 MG/DL
MAN DIFF?: NORMAL
MCHC RBC-ENTMCNC: 30.8 PG
MCHC RBC-ENTMCNC: 33.5 GM/DL
MCV RBC AUTO: 92 FL
MICROSCOPIC-UA: NORMAL
MONOCYTES # BLD AUTO: 0.5 K/UL
MONOCYTES NFR BLD AUTO: 9.3 %
NEUTROPHILS # BLD AUTO: 3.69 K/UL
NEUTROPHILS NFR BLD AUTO: 68.6 %
NITRITE URINE: NEGATIVE
PARATHYROID HORMONE INTACT: 122 PG/ML
PH URINE: 6
PHOSPHATE SERPL-MCNC: 3 MG/DL
PLATELET # BLD AUTO: 173 K/UL
POTASSIUM SERPL-SCNC: 3.9 MMOL/L
PROT SERPL-MCNC: 6.6 G/DL
PROT UR-MCNC: 51 MG/DL
PROTEIN URINE: ABNORMAL
RBC # BLD: 4.87 M/UL
RBC # FLD: 13.1 %
RED BLOOD CELLS URINE: 1 /HPF
SODIUM SERPL-SCNC: 140 MMOL/L
SPECIFIC GRAVITY URINE: 1.01
SQUAMOUS EPITHELIAL CELLS: 0 /HPF
T3RU NFR SERPL: 1 TBI
T4 SERPL-MCNC: 8.3 UG/DL
TRIGL SERPL-MCNC: 140 MG/DL
TSH SERPL-ACNC: 2.77 UIU/ML
URATE SERPL-MCNC: 5.9 MG/DL
UROBILINOGEN URINE: NORMAL
WBC # FLD AUTO: 5.37 K/UL
WHITE BLOOD CELLS URINE: 0 /HPF

## 2020-06-30 ENCOUNTER — APPOINTMENT (OUTPATIENT)
Dept: NEPHROLOGY | Facility: CLINIC | Age: 85
End: 2020-06-30
Payer: MEDICARE

## 2020-06-30 PROCEDURE — 99214 OFFICE O/P EST MOD 30 MIN: CPT | Mod: 95

## 2020-06-30 NOTE — ASSESSMENT
[FreeTextEntry1] : CKD 3: Renal function baseline.\par Hyperkalemia: Stable.  Keep on low K diet\par HTN: BP mildly elevated. Decrease Na intake- he has been eating pickles\par Keep current doses of amlodipine, diovan back to the usual dose twice daily, hydralazine along with all other meds\par If pulse remains in the 40s may need to decrease the metoprolol to 25mg\par Secondary hyperparathyroidism: Continue vitamin D over-the-counter and continue calcitriol 4 times a week.\par Follow-up in 6 months

## 2020-06-30 NOTE — HISTORY OF PRESENT ILLNESS
[Home] : at home, [unfilled] , at the time of the visit. [Medical Office: ([facility])___] : at the medical office located in  [Spouse] : spouse [Verbal consent obtained from patient] : the patient, [unfilled] [FreeTextEntry1] : 88 yo male here for CKD3 HTN here for follow-up\par Doing well.  Will be going to [place] in January for the winter.\par

## 2020-06-30 NOTE — PHYSICAL EXAM
[General Appearance - Alert] : alert [General Appearance - In No Acute Distress] : in no acute distress [Oriented To Time, Place, And Person] : oriented to person, place, and time [FreeTextEntry1] : ankle edema

## 2020-07-07 ENCOUNTER — NON-APPOINTMENT (OUTPATIENT)
Age: 85
End: 2020-07-07

## 2020-07-07 ENCOUNTER — APPOINTMENT (OUTPATIENT)
Dept: INTERNAL MEDICINE | Facility: CLINIC | Age: 85
End: 2020-07-07
Payer: MEDICARE

## 2020-07-07 VITALS
TEMPERATURE: 97.6 F | HEART RATE: 58 BPM | WEIGHT: 162 LBS | HEIGHT: 65 IN | BODY MASS INDEX: 26.99 KG/M2 | OXYGEN SATURATION: 98 % | SYSTOLIC BLOOD PRESSURE: 170 MMHG | DIASTOLIC BLOOD PRESSURE: 80 MMHG

## 2020-07-07 VITALS — DIASTOLIC BLOOD PRESSURE: 80 MMHG | SYSTOLIC BLOOD PRESSURE: 146 MMHG

## 2020-07-07 DIAGNOSIS — Z86.39 PERSONAL HISTORY OF OTHER ENDOCRINE, NUTRITIONAL AND METABOLIC DISEASE: ICD-10-CM

## 2020-07-07 DIAGNOSIS — N17.9 ACUTE KIDNEY FAILURE, UNSPECIFIED: ICD-10-CM

## 2020-07-07 PROCEDURE — 99214 OFFICE O/P EST MOD 30 MIN: CPT | Mod: 25

## 2020-07-07 PROCEDURE — 36415 COLL VENOUS BLD VENIPUNCTURE: CPT

## 2020-07-07 PROCEDURE — 93000 ELECTROCARDIOGRAM COMPLETE: CPT

## 2020-07-08 ENCOUNTER — APPOINTMENT (OUTPATIENT)
Dept: INTERNAL MEDICINE | Facility: CLINIC | Age: 85
End: 2020-07-08

## 2020-07-08 NOTE — RESULTS/DATA
[de-identified] : WNL [] : results reviewed [de-identified] : NSR 57 no acute st/t changes [de-identified] : stable mildly elevated creatinine at 1.9

## 2020-07-08 NOTE — ASSESSMENT
[As per surgery] : as per surgery [FreeTextEntry4] : STANLEY STRELISKER is a 89 yo man with hypertension, hypercholesterolemia, CRI, venous insufficiency, BPH here for a POC prior to surgery on anorectal area.  The patient has a stable EKG and good exercise tolerance.  There are no contraindications to proceeding with the planned procedure.  The patient is medically optimized.  The patient was advised to avoid NSAIDs for 7 days prior to the procedure.\par \par \par  [Patient Optimized for Surgery] : Patient optimized for surgery

## 2020-07-08 NOTE — REVIEW OF SYSTEMS
[Fever] : no fever [Nasal Discharge] : no nasal discharge [Abdominal Pain] : no abdominal pain [Shortness Of Breath] : no shortness of breath [Chest Pain] : no chest pain [Skin Rash] : no skin rash

## 2020-07-08 NOTE — PHYSICAL EXAM
[No Acute Distress] : no acute distress [Well Nourished] : well nourished [Well Developed] : well developed [EOMI] : extraocular movements intact [Well-Appearing] : well-appearing [Normal Outer Ear/Nose] : the outer ears and nose were normal in appearance [Normal TMs] : both tympanic membranes were normal [Normal Oropharynx] : the oropharynx was normal [No Lymphadenopathy] : no lymphadenopathy [Supple] : supple [No Accessory Muscle Use] : no accessory muscle use [No Respiratory Distress] : no respiratory distress  [Normal Rate] : normal rate  [Clear to Auscultation] : lungs were clear to auscultation bilaterally [Regular Rhythm] : with a regular rhythm [Non Tender] : non-tender [Soft] : abdomen soft [Normal S1, S2] : normal S1 and S2 [Normal Gait] : normal gait [Alert and Oriented x3] : oriented to person, place, and time [de-identified] : mild bilateral ankle edema [de-identified] : Lipoma left upper back

## 2020-07-08 NOTE — HISTORY OF PRESENT ILLNESS
[Chronic Kidney Disease] : chronic kidney disease [Aortic Stenosis] : no aortic stenosis [Atrial Fibrillation] : no atrial fibrillation [Asthma] : no asthma [Coronary Artery Disease] : no coronary artery disease [Sleep Apnea] : no sleep apnea [Smoker] : not a smoker [COPD] : no COPD [Family Member] : no family member with adverse anesthesia reaction/sudden death [Self] : no previous adverse anesthesia reaction [Chronic Anticoagulation] : no chronic anticoagulation [FreeTextEntry1] : Surgery on anorectal area [Diabetes] : no diabetes [FreeTextEntry2] : 7/13/2020 [FreeTextEntry3] : Dr Vitaliy Pollard [FreeTextEntry4] : STANLEY STRELISKER is a 89 yo man with hypertension, hypercholesterolemia, CRI, venous insufficiency, BPH here for a POC prior to surgery on anorectal area.  The patient has been well overall.\par \par Hypertension, hypercholesterolemia, venous insufficiency, BPH stable on current medications.  Going on Friday for Covid 19 nasal swab.\par \par The patient is  with 3 daughters.  He used to manufacture toilet seats. He has no difficulty walking one mile.  He is seen with his wife Angie today.

## 2020-07-09 LAB
ALBUMIN SERPL ELPH-MCNC: 4.6 G/DL
ALP BLD-CCNC: 68 U/L
ALT SERPL-CCNC: 23 U/L
ANION GAP SERPL CALC-SCNC: 14 MMOL/L
AST SERPL-CCNC: 29 U/L
BASOPHILS # BLD AUTO: 0.04 K/UL
BASOPHILS NFR BLD AUTO: 0.7 %
BILIRUB SERPL-MCNC: 0.3 MG/DL
BUN SERPL-MCNC: 33 MG/DL
CALCIUM SERPL-MCNC: 8.9 MG/DL
CHLORIDE SERPL-SCNC: 102 MMOL/L
CO2 SERPL-SCNC: 22 MMOL/L
CREAT SERPL-MCNC: 1.92 MG/DL
EOSINOPHIL # BLD AUTO: 0.19 K/UL
EOSINOPHIL NFR BLD AUTO: 3.3 %
GLUCOSE SERPL-MCNC: 103 MG/DL
HCT VFR BLD CALC: 44.1 %
HGB BLD-MCNC: 14.5 G/DL
IMM GRANULOCYTES NFR BLD AUTO: 0.2 %
LYMPHOCYTES # BLD AUTO: 1.18 K/UL
LYMPHOCYTES NFR BLD AUTO: 20.6 %
MAN DIFF?: NORMAL
MCHC RBC-ENTMCNC: 31.3 PG
MCHC RBC-ENTMCNC: 32.9 GM/DL
MCV RBC AUTO: 95.2 FL
MONOCYTES # BLD AUTO: 0.69 K/UL
MONOCYTES NFR BLD AUTO: 12.1 %
NEUTROPHILS # BLD AUTO: 3.61 K/UL
NEUTROPHILS NFR BLD AUTO: 63.1 %
PLATELET # BLD AUTO: 169 K/UL
POTASSIUM SERPL-SCNC: 4.4 MMOL/L
PROT SERPL-MCNC: 6.9 G/DL
RBC # BLD: 4.63 M/UL
RBC # FLD: 13.2 %
SODIUM SERPL-SCNC: 138 MMOL/L
WBC # FLD AUTO: 5.72 K/UL

## 2020-07-09 RX ORDER — LIDOCAINE HCL 20 MG/ML
0.2 VIAL (ML) INJECTION ONCE
Refills: 0 | Status: DISCONTINUED | OUTPATIENT
Start: 2020-07-13 | End: 2020-07-28

## 2020-07-09 RX ORDER — SODIUM CHLORIDE 9 MG/ML
3 INJECTION INTRAMUSCULAR; INTRAVENOUS; SUBCUTANEOUS EVERY 8 HOURS
Refills: 0 | Status: DISCONTINUED | OUTPATIENT
Start: 2020-07-13 | End: 2020-07-28

## 2020-07-12 ENCOUNTER — TRANSCRIPTION ENCOUNTER (OUTPATIENT)
Age: 85
End: 2020-07-12

## 2020-07-13 ENCOUNTER — APPOINTMENT (OUTPATIENT)
Dept: COLORECTAL SURGERY | Facility: HOSPITAL | Age: 85
End: 2020-07-13
Payer: MEDICARE

## 2020-07-13 ENCOUNTER — OUTPATIENT (OUTPATIENT)
Dept: OUTPATIENT SERVICES | Facility: HOSPITAL | Age: 85
LOS: 1 days | End: 2020-07-13
Payer: MEDICARE

## 2020-07-13 ENCOUNTER — RESULT REVIEW (OUTPATIENT)
Age: 85
End: 2020-07-13

## 2020-07-13 VITALS
TEMPERATURE: 97 F | OXYGEN SATURATION: 98 % | SYSTOLIC BLOOD PRESSURE: 181 MMHG | HEART RATE: 63 BPM | DIASTOLIC BLOOD PRESSURE: 70 MMHG | HEIGHT: 64 IN | WEIGHT: 160.06 LBS | RESPIRATION RATE: 14 BRPM

## 2020-07-13 VITALS
HEART RATE: 60 BPM | SYSTOLIC BLOOD PRESSURE: 135 MMHG | DIASTOLIC BLOOD PRESSURE: 66 MMHG | OXYGEN SATURATION: 99 % | RESPIRATION RATE: 14 BRPM

## 2020-07-13 DIAGNOSIS — Z01.818 ENCOUNTER FOR OTHER PREPROCEDURAL EXAMINATION: ICD-10-CM

## 2020-07-13 DIAGNOSIS — D12.7 BENIGN NEOPLASM OF RECTOSIGMOID JUNCTION: ICD-10-CM

## 2020-07-13 LAB — SARS-COV-2 N GENE NPH QL NAA+PROBE: NOT DETECTED

## 2020-07-13 PROCEDURE — 88305 TISSUE EXAM BY PATHOLOGIST: CPT | Mod: 26

## 2020-07-13 PROCEDURE — 88305 TISSUE EXAM BY PATHOLOGIST: CPT

## 2020-07-13 PROCEDURE — 46922 EXCISION OF ANAL LESION(S): CPT

## 2020-07-13 PROCEDURE — C1889: CPT

## 2020-07-13 PROCEDURE — 45990 SURG DX EXAM ANORECTAL: CPT | Mod: XS

## 2020-07-13 PROCEDURE — 45990 SURG DX EXAM ANORECTAL: CPT | Mod: 59

## 2020-07-13 RX ORDER — SODIUM CHLORIDE 9 MG/ML
1000 INJECTION, SOLUTION INTRAVENOUS
Refills: 0 | Status: DISCONTINUED | OUTPATIENT
Start: 2020-07-13 | End: 2020-07-28

## 2020-07-13 RX ORDER — ONDANSETRON 8 MG/1
4 TABLET, FILM COATED ORAL ONCE
Refills: 0 | Status: DISCONTINUED | OUTPATIENT
Start: 2020-07-13 | End: 2020-07-28

## 2020-07-13 NOTE — PRE-ANESTHESIA EVALUATION ADULT - NSANTHOSAYNRD_GEN_A_CORE
No. JUSTEN screening performed.  STOP BANG Legend: 0-2 = LOW Risk; 3-4 = INTERMEDIATE Risk; 5-8 = HIGH Risk

## 2020-07-13 NOTE — ASU DISCHARGE PLAN (ADULT/PEDIATRIC) - CARE PROVIDER_API CALL
Vitaliy Pollard  COLON/RECTAL SURGERY  [address] [place] [address]  Phone: [phone]  Fax: [phone]  Follow Up Time:

## 2020-07-16 LAB — SURGICAL PATHOLOGY STUDY: SIGNIFICANT CHANGE UP

## 2020-07-20 ENCOUNTER — RX RENEWAL (OUTPATIENT)
Age: 85
End: 2020-07-20

## 2020-07-24 ENCOUNTER — APPOINTMENT (OUTPATIENT)
Dept: COLORECTAL SURGERY | Facility: CLINIC | Age: 85
End: 2020-07-24
Payer: MEDICARE

## 2020-07-24 VITALS
DIASTOLIC BLOOD PRESSURE: 78 MMHG | RESPIRATION RATE: 14 BRPM | HEART RATE: 58 BPM | TEMPERATURE: 36 F | SYSTOLIC BLOOD PRESSURE: 180 MMHG

## 2020-07-24 PROCEDURE — 99024 POSTOP FOLLOW-UP VISIT: CPT

## 2020-07-27 NOTE — HISTORY OF PRESENT ILLNESS
[FreeTextEntry1] : Pleasant 89-year-old gentleman who presents for his first postoperative visit. The patient is status post excision of an anal lesion. 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 I will perform an anoscopy in 6 weeks.\par \par

## 2020-08-18 ENCOUNTER — OUTPATIENT (OUTPATIENT)
Dept: OUTPATIENT SERVICES | Facility: HOSPITAL | Age: 85
LOS: 1 days | End: 2020-08-18
Payer: MEDICARE

## 2020-08-18 ENCOUNTER — APPOINTMENT (OUTPATIENT)
Dept: INTERNAL MEDICINE | Facility: CLINIC | Age: 85
End: 2020-08-18
Payer: MEDICARE

## 2020-08-18 ENCOUNTER — TRANSCRIPTION ENCOUNTER (OUTPATIENT)
Age: 85
End: 2020-08-18

## 2020-08-18 ENCOUNTER — APPOINTMENT (OUTPATIENT)
Dept: ULTRASOUND IMAGING | Facility: IMAGING CENTER | Age: 85
End: 2020-08-18
Payer: MEDICARE

## 2020-08-18 VITALS
SYSTOLIC BLOOD PRESSURE: 150 MMHG | OXYGEN SATURATION: 97 % | DIASTOLIC BLOOD PRESSURE: 70 MMHG | WEIGHT: 162 LBS | HEART RATE: 62 BPM | BODY MASS INDEX: 26.99 KG/M2 | TEMPERATURE: 97.8 F | HEIGHT: 65 IN

## 2020-08-18 DIAGNOSIS — I80.00 PHLEBITIS AND THROMBOPHLEBITIS OF SUPERFICIAL VESSELS OF UNSPECIFIED LOWER EXTREMITY: ICD-10-CM

## 2020-08-18 PROCEDURE — 93971 EXTREMITY STUDY: CPT

## 2020-08-18 PROCEDURE — 99214 OFFICE O/P EST MOD 30 MIN: CPT

## 2020-08-18 PROCEDURE — 93971 EXTREMITY STUDY: CPT | Mod: 26,LT

## 2020-08-18 NOTE — HISTORY OF PRESENT ILLNESS
[FreeTextEntry8] : HAYDEE STRELISKER with hypertension, hypercholesterolemia, CRI here for small area of redness, swelling near varicose vein on left calf for the past 4 days.  Denies spreading redness or severe calf pain.  Denies chest pain or sob.  Has had varicose veins for years, worse on left thigh and calf.\par \par Recovered well from rectal surgery\par \par Hypertension, hypercholesterolemia stable on current meds\par \par Sees Dr RADHA Tolbert for CRI\par \par Seen with wife today [FreeTextEntry1] : FUV

## 2020-08-18 NOTE — PHYSICAL EXAM
[No Acute Distress] : no acute distress [Well Nourished] : well nourished [Well Developed] : well developed [Well-Appearing] : well-appearing [No Lymphadenopathy] : no lymphadenopathy [No Respiratory Distress] : no respiratory distress  [Supple] : supple [Clear to Auscultation] : lungs were clear to auscultation bilaterally [No Accessory Muscle Use] : no accessory muscle use [Regular Rhythm] : with a regular rhythm [Normal Rate] : normal rate  [Normal Gait] : normal gait [Normal S1, S2] : normal S1 and S2 [Alert and Oriented x3] : oriented to person, place, and time [de-identified] : varicose veins bilaterally. Very prominent on left calf, thigh.  Medial calf with small area of palpable redness and tenderness

## 2020-08-18 NOTE — REVIEW OF SYSTEMS
[Fever] : no fever [Vision Problems] : no vision problems [Nasal Discharge] : no nasal discharge [Chest Pain] : no chest pain [Shortness Of Breath] : no shortness of breath [Headache] : no headache [Abdominal Pain] : no abdominal pain

## 2020-08-28 ENCOUNTER — APPOINTMENT (OUTPATIENT)
Dept: VASCULAR SURGERY | Facility: CLINIC | Age: 85
End: 2020-08-28

## 2020-09-04 ENCOUNTER — APPOINTMENT (OUTPATIENT)
Dept: COLORECTAL SURGERY | Facility: CLINIC | Age: 85
End: 2020-09-04
Payer: MEDICARE

## 2020-09-04 VITALS — TEMPERATURE: 97.5 F

## 2020-09-04 PROCEDURE — 99213 OFFICE O/P EST LOW 20 MIN: CPT

## 2020-09-09 ENCOUNTER — APPOINTMENT (OUTPATIENT)
Dept: VASCULAR SURGERY | Facility: CLINIC | Age: 85
End: 2020-09-09

## 2020-09-09 ENCOUNTER — APPOINTMENT (OUTPATIENT)
Dept: VASCULAR SURGERY | Facility: CLINIC | Age: 85
End: 2020-09-09
Payer: MEDICARE

## 2020-09-09 VITALS
WEIGHT: 163 LBS | HEART RATE: 53 BPM | SYSTOLIC BLOOD PRESSURE: 195 MMHG | BODY MASS INDEX: 27.49 KG/M2 | HEIGHT: 64.5 IN | DIASTOLIC BLOOD PRESSURE: 77 MMHG | TEMPERATURE: 97.7 F

## 2020-09-09 LAB — SARS-COV-2 N GENE NPH QL NAA+PROBE: NOT DETECTED

## 2020-09-09 PROCEDURE — 99203 OFFICE O/P NEW LOW 30 MIN: CPT

## 2020-09-09 NOTE — HISTORY OF PRESENT ILLNESS
[FreeTextEntry1] : Pleasant 89-year-old gentleman presents for his second postoperative visit.\par \par Patient is 6 weeks status post excision of an anorectal polypoid lesion which revealed severe dysplasia but no invasive malignancy.\par \par Patient looks and feels excellently. He has no pain whatsoever and no further rectal bleeding which was troublesome for months prior to this diagnosis.\par \par Inspection of the anorectal area is unremarkable. On digital exam the sphincter is intact and no residual mass is palpable.\par \par I will see him as needed.

## 2020-09-09 NOTE — REVIEW OF SYSTEMS
[As noted in HPI] : as noted in HPI [Negative] : Heme/Lymph [Dizziness] : no dizziness [Convulsions] : no convulsions [Confused] : no confusion [Limb Weakness] : no limb weakness [Fainting] : no fainting [FreeTextEntry4] : Normocephalic, Atraumatic [Difficulty Walking] : no difficulty walking [FreeTextEntry5] : Normal Rate, Normal Rhythm  [FreeTextEntry6] : CTABL

## 2020-09-09 NOTE — ASSESSMENT
[Arterial/Venous Disease] : arterial/venous disease [FreeTextEntry1] : 89 y/o M who presents to the office with asymptomatic varicose vein on left upper thigh. \par Pt likely had an episode of thrombophlebitis recently that was self limiting\par We discussed possibility of further work up for VI \par  pt. is asymptomatic at this time and is not interested to consider invasive procedures (such as ablation or stabs) we decided to defer testing since it will not change our management\par will start compression stockings and LE elevation \par pt will return if symptoms recur. \par \par

## 2020-09-09 NOTE — HISTORY OF PRESENT ILLNESS
[FreeTextEntry1] : 89 y/o M with extensive PMH presents to the office with chief complaint of discolored varicose vein. He describes having a palpable varicosity on his left upper thigh for a number of years and noticed a change in color when treated for a recent episode of superficial thrombophlebitis. About 2w ago he had pain around the varicosity associated with leg swelling, which all resolved. He denies any claudication or rest pain. \par Pt. had duplex at that time which was neg for DVT.

## 2020-09-09 NOTE — PHYSICAL EXAM
[Respiratory Effort] : normal respiratory effort [Varicose Veins Of Lower Extremities] : present [2+] : left 2+ [Ankle Swelling On The Left] : moderate [Varicose Veins Of The Left Leg] : of the left leg [Alert] : alert [Oriented to Person] : oriented to person [Oriented to Place] : oriented to place [Oriented to Time] : oriented to time [Calm] : calm [JVD] : no jugular venous distention  [Right Carotid Bruit] : no bruit heard over the right carotid [de-identified] : Appears well-nourished [No Rash or Lesion] : No rash or lesion [FreeTextEntry1] : L thigh varicosity  [de-identified] : Normocephalic, Atraumatic [de-identified] : Supple

## 2020-10-05 ENCOUNTER — RX RENEWAL (OUTPATIENT)
Age: 85
End: 2020-10-05

## 2020-10-14 ENCOUNTER — MED ADMIN CHARGE (OUTPATIENT)
Age: 85
End: 2020-10-14

## 2020-10-14 ENCOUNTER — APPOINTMENT (OUTPATIENT)
Dept: INTERNAL MEDICINE | Facility: CLINIC | Age: 85
End: 2020-10-14
Payer: MEDICARE

## 2020-10-14 PROCEDURE — 36415 COLL VENOUS BLD VENIPUNCTURE: CPT

## 2020-10-19 ENCOUNTER — APPOINTMENT (OUTPATIENT)
Dept: INTERNAL MEDICINE | Facility: CLINIC | Age: 85
End: 2020-10-19
Payer: MEDICARE

## 2020-10-19 VITALS
HEIGHT: 63.25 IN | HEART RATE: 60 BPM | TEMPERATURE: 97.7 F | DIASTOLIC BLOOD PRESSURE: 60 MMHG | SYSTOLIC BLOOD PRESSURE: 140 MMHG | BODY MASS INDEX: 28.17 KG/M2 | WEIGHT: 161 LBS | OXYGEN SATURATION: 96 %

## 2020-10-19 VITALS — SYSTOLIC BLOOD PRESSURE: 130 MMHG | DIASTOLIC BLOOD PRESSURE: 64 MMHG

## 2020-10-19 DIAGNOSIS — K92.1 MELENA: ICD-10-CM

## 2020-10-19 DIAGNOSIS — Z87.19 PERSONAL HISTORY OF OTHER DISEASES OF THE DIGESTIVE SYSTEM: ICD-10-CM

## 2020-10-19 DIAGNOSIS — Z86.39 PERSONAL HISTORY OF OTHER ENDOCRINE, NUTRITIONAL AND METABOLIC DISEASE: ICD-10-CM

## 2020-10-19 DIAGNOSIS — H61.23 IMPACTED CERUMEN, BILATERAL: ICD-10-CM

## 2020-10-19 DIAGNOSIS — Z23 ENCOUNTER FOR IMMUNIZATION: ICD-10-CM

## 2020-10-19 DIAGNOSIS — R21 RASH AND OTHER NONSPECIFIC SKIN ERUPTION: ICD-10-CM

## 2020-10-19 DIAGNOSIS — I80.00 PHLEBITIS AND THROMBOPHLEBITIS OF SUPERFICIAL VESSELS OF UNSPECIFIED LOWER EXTREMITY: ICD-10-CM

## 2020-10-19 DIAGNOSIS — H35.30 UNSPECIFIED MACULAR DEGENERATION: ICD-10-CM

## 2020-10-19 DIAGNOSIS — R39.9 UNSPECIFIED SYMPTOMS AND SIGNS INVOLVING THE GENITOURINARY SYSTEM: ICD-10-CM

## 2020-10-19 DIAGNOSIS — E04.2 NONTOXIC MULTINODULAR GOITER: ICD-10-CM

## 2020-10-19 LAB
25(OH)D3 SERPL-MCNC: 39.4 NG/ML
ALBUMIN SERPL ELPH-MCNC: 3.9 G/DL
ALP BLD-CCNC: 75 U/L
ALT SERPL-CCNC: 18 U/L
ANION GAP SERPL CALC-SCNC: 14 MMOL/L
APPEARANCE: CLEAR
AST SERPL-CCNC: 22 U/L
BACTERIA UR CULT: NORMAL
BACTERIA: NEGATIVE
BASOPHILS # BLD AUTO: 0.05 K/UL
BASOPHILS NFR BLD AUTO: 0.6 %
BILIRUB SERPL-MCNC: 0.5 MG/DL
BILIRUBIN URINE: NEGATIVE
BLOOD URINE: NEGATIVE
BUN SERPL-MCNC: 38 MG/DL
CALCIUM SERPL-MCNC: 9.6 MG/DL
CALCIUM SERPL-MCNC: 9.6 MG/DL
CHLORIDE SERPL-SCNC: 105 MMOL/L
CHOLEST SERPL-MCNC: 121 MG/DL
CHOLEST/HDLC SERPL: 2.8 RATIO
CO2 SERPL-SCNC: 23 MMOL/L
COLOR: NORMAL
CREAT SERPL-MCNC: 1.97 MG/DL
CREAT SPEC-SCNC: 39 MG/DL
CREAT/PROT UR: 1.7 RATIO
EOSINOPHIL # BLD AUTO: 0.19 K/UL
EOSINOPHIL NFR BLD AUTO: 2.5 %
GLUCOSE QUALITATIVE U: NEGATIVE
GLUCOSE SERPL-MCNC: 90 MG/DL
HCT VFR BLD CALC: 41.7 %
HDLC SERPL-MCNC: 43 MG/DL
HGB BLD-MCNC: 14 G/DL
HYALINE CASTS: 2 /LPF
IMM GRANULOCYTES NFR BLD AUTO: 0.4 %
KETONES URINE: NEGATIVE
LDLC SERPL CALC-MCNC: 54 MG/DL
LEUKOCYTE ESTERASE URINE: NEGATIVE
LYMPHOCYTES # BLD AUTO: 0.97 K/UL
LYMPHOCYTES NFR BLD AUTO: 12.6 %
MAN DIFF?: NORMAL
MCHC RBC-ENTMCNC: 31 PG
MCHC RBC-ENTMCNC: 33.6 GM/DL
MCV RBC AUTO: 92.5 FL
MICROSCOPIC-UA: NORMAL
MONOCYTES # BLD AUTO: 0.9 K/UL
MONOCYTES NFR BLD AUTO: 11.7 %
NEUTROPHILS # BLD AUTO: 5.58 K/UL
NEUTROPHILS NFR BLD AUTO: 72.2 %
NITRITE URINE: NEGATIVE
PARATHYROID HORMONE INTACT: 238 PG/ML
PH URINE: 6
PHOSPHATE SERPL-MCNC: 3 MG/DL
PLATELET # BLD AUTO: 184 K/UL
POTASSIUM SERPL-SCNC: 4.3 MMOL/L
PROT SERPL-MCNC: 6.5 G/DL
PROT UR-MCNC: 65 MG/DL
PROTEIN URINE: ABNORMAL
RBC # BLD: 4.51 M/UL
RBC # FLD: 12.9 %
RED BLOOD CELLS URINE: 0 /HPF
SODIUM SERPL-SCNC: 142 MMOL/L
SPECIFIC GRAVITY URINE: 1.01
SQUAMOUS EPITHELIAL CELLS: 0 /HPF
T4 FREE SERPL-MCNC: 1.5 NG/DL
TRIGL SERPL-MCNC: 117 MG/DL
TSH SERPL-ACNC: 2.06 UIU/ML
URATE SERPL-MCNC: 6.4 MG/DL
UROBILINOGEN URINE: NORMAL
WBC # FLD AUTO: 7.72 K/UL
WHITE BLOOD CELLS URINE: 1 /HPF

## 2020-10-19 PROCEDURE — 99214 OFFICE O/P EST MOD 30 MIN: CPT

## 2020-10-19 RX ORDER — CLOTRIMAZOLE 10 MG/G
1 CREAM TOPICAL 3 TIMES DAILY
Qty: 1 | Refills: 1 | Status: DISCONTINUED | COMMUNITY
Start: 2020-03-14 | End: 2020-10-19

## 2020-10-19 RX ORDER — KETOCONAZOLE 20 MG/G
2 CREAM TOPICAL TWICE DAILY
Qty: 1 | Refills: 3 | Status: DISCONTINUED | COMMUNITY
Start: 2019-07-26 | End: 2020-10-19

## 2020-10-19 RX ORDER — METHYLPREDNISOLONE 4 MG/1
4 TABLET ORAL
Qty: 1 | Refills: 0 | Status: DISCONTINUED | COMMUNITY
Start: 2020-08-18 | End: 2020-10-19

## 2020-10-19 NOTE — HISTORY OF PRESENT ILLNESS
[FreeTextEntry1] : Hypertension\par Hyperlipidemia\par Chronic kidney disease\par Allergic rhinitis [de-identified] : He overall has been feeling well. He complains of sneezing and rhinorrhea for the past year and all seasons. He saw vascular surgery for varicose veins and was told to wear support stockings.  He sees the the podiatrist. He has no chest pain dyspnea. His wife is concerned he might have a little edema in his legs. His urine is fine. He moves his bowels well with occasional Metamucil.  There is no recurrent rectal bleeding. He sees the eye doctor regularly and his vision is stable  20/50.  He can only read on his phone. He is not driving. He is planning a followup bone density and thyroid ultrasound per endocrinology. He has seen dermatology and had several lesions removed from his back and legs by [facility] surgery

## 2020-10-19 NOTE — ASSESSMENT
[FreeTextEntry1] : His blood pressure is well controlled. His recent blood work was reviewed with him.  His chronic kidney disease appears stable and well compensated. He will have repeat blood work in 3 months. We discussed wearing support stockings for his venous insufficiency. He may have allergic rhinitis but defers a trial of fluticasone nasal spray. He will continue with regular dermatology followup. She will follow through with thyroids ultrasound and repeat bone density and have records forwarded to me. He was advised to see ENT for cerumen removal. He'll be seen again in 3 months

## 2020-10-19 NOTE — PHYSICAL EXAM
[No Acute Distress] : no acute distress [Well Nourished] : well nourished [Well Developed] : well developed [Well-Appearing] : well-appearing [PERRL] : pupils equal round and reactive to light [Normal Voice/Communication] : normal voice/communication [Normal Sclera/Conjunctiva] : normal sclera/conjunctiva [EOMI] : extraocular movements intact [Normal Outer Ear/Nose] : the outer ears and nose were normal in appearance [Normal Oropharynx] : the oropharynx was normal [No JVD] : no jugular venous distention [Normal TMs] : both tympanic membranes were normal [Supple] : supple [No Lymphadenopathy] : no lymphadenopathy [No Accessory Muscle Use] : no accessory muscle use [No Respiratory Distress] : no respiratory distress  [Thyroid Normal, No Nodules] : the thyroid was normal and there were no nodules present [Clear to Auscultation] : lungs were clear to auscultation bilaterally [Normal Percussion] : the chest was normal to percussion [Normal Rate] : normal rate  [Regular Rhythm] : with a regular rhythm [Normal S1, S2] : normal S1 and S2 [Normal] : normal rate, regular rhythm, normal S1 and S2 and no murmur heard [No Murmur] : no murmur heard [No Carotid Bruits] : no carotid bruits [Pedal Pulses Present] : the pedal pulses are present [No Abdominal Bruit] : a ~M bruit was not heard ~T in the abdomen [No Edema] : there was no peripheral edema [No Palpable Aorta] : no palpable aorta [No Extremity Clubbing/Cyanosis] : no extremity clubbing/cyanosis [Normal Appearance] : normal in appearance [No Nipple Discharge] : no nipple discharge [No Axillary Lymphadenopathy] : no axillary lymphadenopathy [Soft] : abdomen soft [Non Tender] : non-tender [No Masses] : no abdominal mass palpated [Non-distended] : non-distended [No HSM] : no HSM [Normal Bowel Sounds] : normal bowel sounds [Normal Supraclavicular Nodes] : no supraclavicular lymphadenopathy [Normal Axillary Nodes] : no axillary lymphadenopathy [Normal Posterior Cervical Nodes] : no posterior cervical lymphadenopathy [Normal Anterior Cervical Nodes] : no anterior cervical lymphadenopathy [No CVA Tenderness] : no CVA  tenderness [Normal Inguinal Nodes] : no inguinal lymphadenopathy [No Spinal Tenderness] : no spinal tenderness [Coordination Grossly Intact] : coordination grossly intact [No Focal Deficits] : no focal deficits [Normal Gait] : normal gait [Speech Grossly Normal] : speech grossly normal [Memory Grossly Normal] : memory grossly normal [Deep Tendon Reflexes (DTR)] : deep tendon reflexes were 2+ and symmetric [Normal Affect] : the affect was normal [Alert and Oriented x3] : oriented to person, place, and time [Normal Insight/Judgement] : insight and judgment were intact [Normal Mood] : the mood was normal [Kyphosis] : no kyphosis [de-identified] : TMs partially blocked by cerumen [Scoliosis] : no scoliosis [de-identified] : Hyperpigmentation anterior shins consistent with solar keratoses in perhaps venous stasis changes. [de-identified] : Large varicosity left thigh. Trace to 1+ left shin edema

## 2021-02-23 ENCOUNTER — TRANSCRIPTION ENCOUNTER (OUTPATIENT)
Age: 86
End: 2021-02-23

## 2021-02-26 ENCOUNTER — APPOINTMENT (OUTPATIENT)
Dept: INTERNAL MEDICINE | Facility: CLINIC | Age: 86
End: 2021-02-26
Payer: MEDICARE

## 2021-02-26 VITALS
HEART RATE: 66 BPM | SYSTOLIC BLOOD PRESSURE: 130 MMHG | WEIGHT: 158 LBS | BODY MASS INDEX: 27.65 KG/M2 | OXYGEN SATURATION: 96 % | TEMPERATURE: 97.9 F | HEIGHT: 63.5 IN | DIASTOLIC BLOOD PRESSURE: 62 MMHG

## 2021-02-26 PROCEDURE — 36415 COLL VENOUS BLD VENIPUNCTURE: CPT

## 2021-02-26 PROCEDURE — 99214 OFFICE O/P EST MOD 30 MIN: CPT | Mod: 25

## 2021-02-26 RX ORDER — FLUTICASONE PROPIONATE AND SALMETEROL 250; 50 UG/1; UG/1
250-50 POWDER RESPIRATORY (INHALATION)
Refills: 0 | Status: DISCONTINUED | COMMUNITY
Start: 2021-02-24 | End: 2021-02-26

## 2021-02-27 NOTE — HISTORY OF PRESENT ILLNESS
[Spouse] : spouse [FreeTextEntry1] : Bilateral pedal edema\par Acute bronchitis [de-identified] : The past 2 weeks his wife has noted that his feet are swelling. He saw his podiatrist yesterday for nail care and was advised to call here. He can still  wear his shoes. He has been careful with salt in his diet and no NSAIDS. His  wife states that feet are  swollen the same morning and night. He was seen in urgent care 2 days ago for acute bronchitis and was Covid negative and is on doxycycline  and Advair and albuterol and  improving. His cough is much less and he has no fever chills.  He had a normal Chest xray in urgent care

## 2021-02-27 NOTE — ASSESSMENT
[FreeTextEntry1] : I suspect his bilateral pedal edema is due to his chronic kidney disease and dietary indiscretion.   He had a recent negative chest x-ray and clinically he is not in heart failure. I will repeat renal function CBC protein creatinine ratio his urine. He will weight himself daily. We discussed leg elevation He will continue with salt restriction.   He will increase his torsemide to b.i.d. for the next 4 days.  He will report his progress next week\par His acute bronchitis appears improving and he will complete his course of doxycycline and one month of Advair and albuterol

## 2021-02-27 NOTE — PHYSICAL EXAM
[No Acute Distress] : no acute distress [Well Nourished] : well nourished [Well Developed] : well developed [Well-Appearing] : well-appearing [Normal Sclera/Conjunctiva] : normal sclera/conjunctiva [PERRL] : pupils equal round and reactive to light [EOMI] : extraocular movements intact [Normal Outer Ear/Nose] : the outer ears and nose were normal in appearance [Normal Oropharynx] : the oropharynx was normal [No JVD] : no jugular venous distention [No Lymphadenopathy] : no lymphadenopathy [Supple] : supple [Thyroid Normal, No Nodules] : the thyroid was normal and there were no nodules present [No Respiratory Distress] : no respiratory distress  [No Accessory Muscle Use] : no accessory muscle use [Clear to Auscultation] : lungs were clear to auscultation bilaterally [Normal Rate] : normal rate  [Regular Rhythm] : with a regular rhythm [Normal S1, S2] : normal S1 and S2 [No Murmur] : no murmur heard [No Carotid Bruits] : no carotid bruits [No Abdominal Bruit] : a ~M bruit was not heard ~T in the abdomen [No Varicosities] : no varicosities [Pedal Pulses Present] : the pedal pulses are present [No Palpable Aorta] : no palpable aorta [No Extremity Clubbing/Cyanosis] : no extremity clubbing/cyanosis [Soft] : abdomen soft [Non Tender] : non-tender [Non-distended] : non-distended [No Masses] : no abdominal mass palpated [No HSM] : no HSM [Normal Bowel Sounds] : normal bowel sounds [Normal Posterior Cervical Nodes] : no posterior cervical lymphadenopathy [Normal Anterior Cervical Nodes] : no anterior cervical lymphadenopathy [No CVA Tenderness] : no CVA  tenderness [No Spinal Tenderness] : no spinal tenderness [No Joint Swelling] : no joint swelling [Grossly Normal Strength/Tone] : grossly normal strength/tone [No Rash] : no rash [Speech Grossly Normal] : speech grossly normal [Memory Grossly Normal] : memory grossly normal [Normal Affect] : the affect was normal [Alert and Oriented x3] : oriented to person, place, and time [Normal Mood] : the mood was normal [Normal Insight/Judgement] : insight and judgment were intact [Normal Voice/Communication] : normal voice/communication [Normal Percussion] : the chest was normal to percussion [Normal Supraclavicular Nodes] : no supraclavicular lymphadenopathy [Normal Axillary Nodes] : no axillary lymphadenopathy [Normal Inguinal Nodes] : no inguinal lymphadenopathy [de-identified] : Good airflow no wheeze [de-identified] : 1+ pedal edema bilaterally right slightly greater than left there is no cords or Pako. No pretibial edema

## 2021-03-01 LAB
ALBUMIN SERPL ELPH-MCNC: 4.4 G/DL
ALP BLD-CCNC: 69 U/L
ALT SERPL-CCNC: 20 U/L
ANION GAP SERPL CALC-SCNC: 13 MMOL/L
APPEARANCE: CLEAR
AST SERPL-CCNC: 24 U/L
BACTERIA: NEGATIVE
BASOPHILS # BLD AUTO: 0.06 K/UL
BASOPHILS NFR BLD AUTO: 0.9 %
BILIRUB SERPL-MCNC: 0.4 MG/DL
BILIRUBIN URINE: NEGATIVE
BLOOD URINE: NEGATIVE
BUN SERPL-MCNC: 35 MG/DL
CALCIUM SERPL-MCNC: 9.5 MG/DL
CHLORIDE SERPL-SCNC: 104 MMOL/L
CO2 SERPL-SCNC: 21 MMOL/L
COLOR: YELLOW
CREAT SERPL-MCNC: 1.95 MG/DL
CREAT SPEC-SCNC: 54 MG/DL
EOSINOPHIL # BLD AUTO: 0.33 K/UL
EOSINOPHIL NFR BLD AUTO: 5 %
GLUCOSE QUALITATIVE U: NEGATIVE
GLUCOSE SERPL-MCNC: 106 MG/DL
HCT VFR BLD CALC: 42.7 %
HGB BLD-MCNC: 14.1 G/DL
HYALINE CASTS: 2 /LPF
IMM GRANULOCYTES NFR BLD AUTO: 0.2 %
KETONES URINE: NEGATIVE
LEUKOCYTE ESTERASE URINE: NEGATIVE
LYMPHOCYTES # BLD AUTO: 1.01 K/UL
LYMPHOCYTES NFR BLD AUTO: 15.3 %
MAN DIFF?: NORMAL
MCHC RBC-ENTMCNC: 31 PG
MCHC RBC-ENTMCNC: 33 GM/DL
MCV RBC AUTO: 93.8 FL
MICROALBUMIN 24H UR DL<=1MG/L-MCNC: 62.9 MG/DL
MICROALBUMIN/CREAT 24H UR-RTO: 1163 MG/G
MICROSCOPIC-UA: NORMAL
MONOCYTES # BLD AUTO: 0.68 K/UL
MONOCYTES NFR BLD AUTO: 10.3 %
NEUTROPHILS # BLD AUTO: 4.51 K/UL
NEUTROPHILS NFR BLD AUTO: 68.3 %
NITRITE URINE: NEGATIVE
PH URINE: 6
PHOSPHATE SERPL-MCNC: 2.8 MG/DL
PLATELET # BLD AUTO: 190 K/UL
POTASSIUM SERPL-SCNC: 4 MMOL/L
PROT SERPL-MCNC: 6.8 G/DL
PROTEIN URINE: ABNORMAL
RBC # BLD: 4.55 M/UL
RBC # FLD: 13.5 %
RED BLOOD CELLS URINE: 0 /HPF
SODIUM SERPL-SCNC: 137 MMOL/L
SPECIFIC GRAVITY URINE: 1.01
SQUAMOUS EPITHELIAL CELLS: 0 /HPF
UROBILINOGEN URINE: NORMAL
WBC # FLD AUTO: 6.6 K/UL
WHITE BLOOD CELLS URINE: 1 /HPF

## 2021-03-11 ENCOUNTER — TRANSCRIPTION ENCOUNTER (OUTPATIENT)
Age: 86
End: 2021-03-11

## 2021-03-11 ENCOUNTER — INPATIENT (INPATIENT)
Facility: HOSPITAL | Age: 86
LOS: 3 days | Discharge: HOME | DRG: 683 | End: 2021-03-15
Attending: HOSPITALIST | Admitting: INTERNAL MEDICINE
Payer: MEDICARE

## 2021-03-11 VITALS
HEIGHT: 64 IN | OXYGEN SATURATION: 98 % | HEART RATE: 86 BPM | TEMPERATURE: 98 F | DIASTOLIC BLOOD PRESSURE: 70 MMHG | RESPIRATION RATE: 20 BRPM | WEIGHT: 162.92 LBS | SYSTOLIC BLOOD PRESSURE: 144 MMHG

## 2021-03-11 DIAGNOSIS — I10 ESSENTIAL (PRIMARY) HYPERTENSION: ICD-10-CM

## 2021-03-11 DIAGNOSIS — N17.9 ACUTE KIDNEY FAILURE, UNSPECIFIED: ICD-10-CM

## 2021-03-11 DIAGNOSIS — Z29.9 ENCOUNTER FOR PROPHYLACTIC MEASURES, UNSPECIFIED: ICD-10-CM

## 2021-03-11 DIAGNOSIS — R07.9 CHEST PAIN, UNSPECIFIED: ICD-10-CM

## 2021-03-11 LAB
ALBUMIN SERPL ELPH-MCNC: 4 G/DL — SIGNIFICANT CHANGE UP (ref 3.3–5)
ALP SERPL-CCNC: 66 U/L — SIGNIFICANT CHANGE UP (ref 40–120)
ALT FLD-CCNC: 24 U/L — SIGNIFICANT CHANGE UP (ref 10–45)
ANION GAP SERPL CALC-SCNC: 12 MMOL/L — SIGNIFICANT CHANGE UP (ref 5–17)
APPEARANCE UR: CLEAR — SIGNIFICANT CHANGE UP
AST SERPL-CCNC: 26 U/L — SIGNIFICANT CHANGE UP (ref 10–40)
BACTERIA # UR AUTO: NEGATIVE — SIGNIFICANT CHANGE UP
BASOPHILS # BLD AUTO: 0.04 K/UL — SIGNIFICANT CHANGE UP (ref 0–0.2)
BASOPHILS NFR BLD AUTO: 0.6 % — SIGNIFICANT CHANGE UP (ref 0–2)
BILIRUB SERPL-MCNC: 0.3 MG/DL — SIGNIFICANT CHANGE UP (ref 0.2–1.2)
BILIRUB UR-MCNC: NEGATIVE — SIGNIFICANT CHANGE UP
BUN SERPL-MCNC: 39 MG/DL — HIGH (ref 7–23)
CALCIUM SERPL-MCNC: 9.9 MG/DL — SIGNIFICANT CHANGE UP (ref 8.4–10.5)
CHLORIDE SERPL-SCNC: 104 MMOL/L — SIGNIFICANT CHANGE UP (ref 96–108)
CK MB CFR SERPL CALC: 4.3 NG/ML — SIGNIFICANT CHANGE UP (ref 0–6.7)
CK SERPL-CCNC: 72 U/L — SIGNIFICANT CHANGE UP (ref 30–200)
CO2 SERPL-SCNC: 21 MMOL/L — LOW (ref 22–31)
COLOR SPEC: YELLOW — SIGNIFICANT CHANGE UP
CREAT SERPL-MCNC: 2.21 MG/DL — HIGH (ref 0.5–1.3)
DIFF PNL FLD: NEGATIVE — SIGNIFICANT CHANGE UP
EOSINOPHIL # BLD AUTO: 0.19 K/UL — SIGNIFICANT CHANGE UP (ref 0–0.5)
EOSINOPHIL NFR BLD AUTO: 2.7 % — SIGNIFICANT CHANGE UP (ref 0–6)
EPI CELLS # UR: 1 /HPF — SIGNIFICANT CHANGE UP
GLUCOSE SERPL-MCNC: 101 MG/DL — HIGH (ref 70–99)
GLUCOSE UR QL: NEGATIVE — SIGNIFICANT CHANGE UP
HCT VFR BLD CALC: 41.5 % — SIGNIFICANT CHANGE UP (ref 39–50)
HGB BLD-MCNC: 14 G/DL — SIGNIFICANT CHANGE UP (ref 13–17)
HYALINE CASTS # UR AUTO: 1 /LPF — SIGNIFICANT CHANGE UP (ref 0–2)
IMM GRANULOCYTES NFR BLD AUTO: 0.4 % — SIGNIFICANT CHANGE UP (ref 0–1.5)
KETONES UR-MCNC: NEGATIVE — SIGNIFICANT CHANGE UP
LEUKOCYTE ESTERASE UR-ACNC: NEGATIVE — SIGNIFICANT CHANGE UP
LYMPHOCYTES # BLD AUTO: 0.91 K/UL — LOW (ref 1–3.3)
LYMPHOCYTES # BLD AUTO: 13 % — SIGNIFICANT CHANGE UP (ref 13–44)
MCHC RBC-ENTMCNC: 30.6 PG — SIGNIFICANT CHANGE UP (ref 27–34)
MCHC RBC-ENTMCNC: 33.7 GM/DL — SIGNIFICANT CHANGE UP (ref 32–36)
MCV RBC AUTO: 90.8 FL — SIGNIFICANT CHANGE UP (ref 80–100)
MONOCYTES # BLD AUTO: 0.82 K/UL — SIGNIFICANT CHANGE UP (ref 0–0.9)
MONOCYTES NFR BLD AUTO: 11.7 % — SIGNIFICANT CHANGE UP (ref 2–14)
NEUTROPHILS # BLD AUTO: 5.01 K/UL — SIGNIFICANT CHANGE UP (ref 1.8–7.4)
NEUTROPHILS NFR BLD AUTO: 71.6 % — SIGNIFICANT CHANGE UP (ref 43–77)
NITRITE UR-MCNC: NEGATIVE — SIGNIFICANT CHANGE UP
NRBC # BLD: 0 /100 WBCS — SIGNIFICANT CHANGE UP (ref 0–0)
PH UR: 6.5 — SIGNIFICANT CHANGE UP (ref 5–8)
PLATELET # BLD AUTO: 205 K/UL — SIGNIFICANT CHANGE UP (ref 150–400)
POTASSIUM SERPL-MCNC: 4.4 MMOL/L — SIGNIFICANT CHANGE UP (ref 3.5–5.3)
POTASSIUM SERPL-SCNC: 4.4 MMOL/L — SIGNIFICANT CHANGE UP (ref 3.5–5.3)
PROT SERPL-MCNC: 6.9 G/DL — SIGNIFICANT CHANGE UP (ref 6–8.3)
PROT UR-MCNC: ABNORMAL
RBC # BLD: 4.57 M/UL — SIGNIFICANT CHANGE UP (ref 4.2–5.8)
RBC # FLD: 13 % — SIGNIFICANT CHANGE UP (ref 10.3–14.5)
RBC CASTS # UR COMP ASSIST: 0 /HPF — SIGNIFICANT CHANGE UP (ref 0–4)
SODIUM SERPL-SCNC: 137 MMOL/L — SIGNIFICANT CHANGE UP (ref 135–145)
SP GR SPEC: 1.01 — SIGNIFICANT CHANGE UP (ref 1.01–1.02)
TROPONIN T, HIGH SENSITIVITY RESULT: 76 NG/L — HIGH (ref 0–51)
TROPONIN T, HIGH SENSITIVITY RESULT: 76 NG/L — HIGH (ref 0–51)
UROBILINOGEN FLD QL: NEGATIVE — SIGNIFICANT CHANGE UP
WBC # BLD: 7 K/UL — SIGNIFICANT CHANGE UP (ref 3.8–10.5)
WBC # FLD AUTO: 7 K/UL — SIGNIFICANT CHANGE UP (ref 3.8–10.5)
WBC UR QL: 1 /HPF — SIGNIFICANT CHANGE UP (ref 0–5)

## 2021-03-11 PROCEDURE — 93010 ELECTROCARDIOGRAM REPORT: CPT | Mod: GC

## 2021-03-11 PROCEDURE — 71045 X-RAY EXAM CHEST 1 VIEW: CPT | Mod: 26

## 2021-03-11 PROCEDURE — 99285 EMERGENCY DEPT VISIT HI MDM: CPT | Mod: CS,GC

## 2021-03-11 PROCEDURE — 99223 1ST HOSP IP/OBS HIGH 75: CPT

## 2021-03-11 RX ORDER — CALCITRIOL 0.5 UG/1
0.25 CAPSULE ORAL
Refills: 0 | Status: DISCONTINUED | OUTPATIENT
Start: 2021-03-12 | End: 2021-03-15

## 2021-03-11 RX ORDER — PREGABALIN 225 MG/1
1000 CAPSULE ORAL DAILY
Refills: 0 | Status: DISCONTINUED | OUTPATIENT
Start: 2021-03-11 | End: 2021-03-15

## 2021-03-11 RX ORDER — ASPIRIN/CALCIUM CARB/MAGNESIUM 324 MG
81 TABLET ORAL DAILY
Refills: 0 | Status: DISCONTINUED | OUTPATIENT
Start: 2021-03-12 | End: 2021-03-15

## 2021-03-11 RX ORDER — FINASTERIDE 5 MG/1
5 TABLET, FILM COATED ORAL DAILY
Refills: 0 | Status: DISCONTINUED | OUTPATIENT
Start: 2021-03-11 | End: 2021-03-15

## 2021-03-11 RX ORDER — HYDRALAZINE HCL 50 MG
100 TABLET ORAL THREE TIMES A DAY
Refills: 0 | Status: DISCONTINUED | OUTPATIENT
Start: 2021-03-11 | End: 2021-03-15

## 2021-03-11 RX ORDER — HEPARIN SODIUM 5000 [USP'U]/ML
5000 INJECTION INTRAVENOUS; SUBCUTANEOUS EVERY 8 HOURS
Refills: 0 | Status: DISCONTINUED | OUTPATIENT
Start: 2021-03-11 | End: 2021-03-15

## 2021-03-11 RX ORDER — ASPIRIN/CALCIUM CARB/MAGNESIUM 324 MG
325 TABLET ORAL ONCE
Refills: 0 | Status: COMPLETED | OUTPATIENT
Start: 2021-03-11 | End: 2021-03-11

## 2021-03-11 RX ORDER — CHOLECALCIFEROL (VITAMIN D3) 125 MCG
2000 CAPSULE ORAL DAILY
Refills: 0 | Status: DISCONTINUED | OUTPATIENT
Start: 2021-03-11 | End: 2021-03-15

## 2021-03-11 RX ORDER — ALLOPURINOL 300 MG
100 TABLET ORAL DAILY
Refills: 0 | Status: DISCONTINUED | OUTPATIENT
Start: 2021-03-11 | End: 2021-03-15

## 2021-03-11 RX ADMIN — FINASTERIDE 5 MILLIGRAM(S): 5 TABLET, FILM COATED ORAL at 23:17

## 2021-03-11 RX ADMIN — Medication 325 MILLIGRAM(S): at 19:00

## 2021-03-11 RX ADMIN — Medication 100 MILLIGRAM(S): at 23:16

## 2021-03-11 RX ADMIN — Medication 100 MILLIGRAM(S): at 23:17

## 2021-03-11 NOTE — ED ADULT NURSE REASSESSMENT NOTE - NS ED NURSE REASSESS COMMENT FT1
Pt resting comfortably in bed, A&Ox3 speaking coherently. Fall precautions in place. Pt has no current c/o pain or discomfort. Denies any chest pain or SOB, no s/s distress. On cardiac monitor, indicating NSR. To be admitted to tele, pt verbalizes understanding of plan of care, all questions answered.

## 2021-03-11 NOTE — ED PROVIDER NOTE - OBJECTIVE STATEMENT
89M pmh HTN presents with chest pain. Pt states since this morning he has had exertional chest pressure. States when walking he was having 3/10 pain over his chest. Non-radiating, no nausea or diaphoresis. Alleviated with rest. Currently no CP. States has not had any previous cardiac testing.

## 2021-03-11 NOTE — ED ADULT NURSE NOTE - OBJECTIVE STATEMENT
89 y/o male PMHX HTN, CKD, Macular degeneration, presents with complaints of chest tightness which started today and SOB. pt breathing unlabored at rest, pt denies any CP, non radiating, pt non diaphoretic, pt states just walking to the mailbox today he was SOB. pt denies any fever, chills, nausea, vomiting or diarrhea, denies any abdominal discomfort denies any urinary symptoms. safety precautions in place. call bell in reach.

## 2021-03-11 NOTE — ED PROVIDER NOTE - NS ED ROS FT
REVIEW OF SYSTEMS:  General: no fever, no chills  HEENT: no headache, no vision changes  Cardiac: +chest pain, no palpitations  Respiratory: no cough, no shortness of breath  Gastrointestinal: no abdominal pain, no nausea, no vomiting, no diarrhea  Genitourinary: no hematuria, no dysuria, no urinary frequency  Extremities: no extremity swelling, no extremity pain  Neuro: no focal weakness, no numbness/tingling of the extremities, no decreased sensation  Heme: no easy bleeding, no easy bruising  Skin: no jaundice,  no rashes, no lesions  All other ROS as documented in HPI  -Gatito Leslie, PGY-3

## 2021-03-11 NOTE — H&P ADULT - NSICDXPASTMEDICALHX_GEN_ALL_CORE_FT
PAST MEDICAL HISTORY:  Dyslipidemia     HTN - Hypertension     Hyperparathyroidism     Macular Degeneration Left Eye

## 2021-03-11 NOTE — H&P ADULT - NSHPREVIEWOFSYSTEMS_GEN_ALL_CORE
CONSTITUTIONAL: No fever, no chills  EYES: No eye pain, no acute blindness  Mouth: no pain in mouth, no cuts  RESPIRATORY: No cough, No sob  CARDIOVASCULAR: CP+, no palpitations  GASTROINTESTINAL: no abdominal pain, no n/v/d  GENITOURINARY: No dysuria, no hematuria  Heme: No easy bruising, no swelling of neck  NEUROLOGICAL: No seizure, No acute paralysis  SKIN: No itching, no rashes  MUSCULOSKELETAL: No acute joint pain, no joint swelling

## 2021-03-11 NOTE — H&P ADULT - ASSESSMENT
89M w/ HTN, CKDIV, enlarged prostate presents to [facility] for evaluation of cp admit to medicine for further evaluation.

## 2021-03-11 NOTE — ED PROVIDER NOTE - ATTENDING CONTRIBUTION TO CARE
Private Physician Berlin Morales PCP  89y male pmh [place] wears hearing aids, HTN, HLD, BPH, Gout, osteoporosis and HTN, Renal insuffiencey comes to ed w c/o Private Physician [place] Morales PCP  89y male pmh [place] wears hearing aids, HTN, HLD, BPH, Gout, osteoporosis and HTN, Renal insuffiencey comes to ed w c/o shortness of breath,chest tightness while walking this morning. Lasted approx 5min. No dizzy/loc/diaphroesis/palps/nvdc. Occurred with walking and came on with rest multiple times. No prior hx of similar complaints. No cp at present. PE well developed and well nourished male nad ncat neck supple chest clear anterior & posterior cv no rubs, gallops or murmurs neuro no focal defects. cv no rubs, gallops or murmurs, neuro no focal defects  Diomedes Figueroa MD, Facep

## 2021-03-11 NOTE — ED PROVIDER NOTE - PHYSICAL EXAMINATION
Physical Exam:  Gen: NAD, AOx3, non-toxic appearing, able to ambulate without assistance  Head: NCAT  HEENT: EOMI, PEERLA, normal conjunctiva, tongue midline, oral mucosa moist  Lung: CTAB, no respiratory distress, no wheezes/rhonchi/rales B/L, speaking in full sentences  CV: RRR, no murmurs, rubs or gallops  Abd: soft, NT, ND, no guarding, no rigidity, no rebound tenderness, no CVA tenderness   MSK: no visible deformities, ROM normal in UE/LE, no back pain  Neuro: No focal sensory or motor deficits  Skin: Warm, well perfused, no rash, no leg swelling  Psych: normal affect, calm  ~Gatito Leslie M.D. PGY-3

## 2021-03-11 NOTE — H&P ADULT - NSHPLABSRESULTS_GEN_ALL_CORE
Personally reviewed available labs, imaging and ekg  CBC Full  -  ( 11 Mar 2021 19:00 )  WBC Count : 7.00 K/uL  RBC Count : 4.57 M/uL  Hemoglobin : 14.0 g/dL  Hematocrit : 41.5 %  Platelet Count - Automated : 205 K/uL  Mean Cell Volume : 90.8 fl  Mean Cell Hemoglobin : 30.6 pg  Mean Cell Hemoglobin Concentration : 33.7 gm/dL  Auto Neutrophil # : 5.01 K/uL  Auto Lymphocyte # : 0.91 K/uL  Auto Monocyte # : 0.82 K/uL  Auto Eosinophil # : 0.19 K/uL  Auto Basophil # : 0.04 K/uL  Auto Neutrophil % : 71.6 %  Auto Lymphocyte % : 13.0 %  Auto Monocyte % : 11.7 %  Auto Eosinophil % : 2.7 %  Auto Basophil % : 0.6 %  03-11  137  |  104  |  39<H>  ----------------------------<  101<H>  4.4   |  21<L>  |  2.21<H>  Ca    9.9      11 Mar 2021 19:00  TPro  6.9  /  Alb  4.0  /  TBili  0.3  /  DBili  x   /  AST  26  /  ALT  24  /  AlkPhos  66  03-11  Troponin T, High Sensitivity Result: 76: (03.11.21 @ 19:00)  Troponin T, High Sensitivity Result: 76:  (03.11.21 @ 20:33)  CKMB Units: 4.3 ng/mL (03.11.21 @ 20:33)  Creatine Kinase, Serum: 72 U/L (03.11.21 @ 20:33)    Imaging: CXR does not demonstrate lobar opacification  EKG: NSR w/ TWI in III and aVF

## 2021-03-11 NOTE — H&P ADULT - HISTORY OF PRESENT ILLNESS
89M w/ HTN, CKDIV, enlarged prostate presents to [facility] for evaluation of cp. The patient was reports that when he walked to the end of his driveway to [employer] mail he developed sudden, generalized, chest pressure, 3/10, lasting seconds, nonradaiting, resolving with rest and then recurring when walking back to home and again resolving with rest, not associated with diaphoresis or sob. He denies having cp previously or hx of heart disease. No orthopnea or pnd reported. Does have baseline b/l lower extremity swelling for which he takes torsemide. He does not report daily asa use. No fever, chills, cough, n/v/d, or painful urination. he has completed vaccine series for COVID19 series with 2nd injection completed 1 month prior.    In the ED, VS 97.7, 144/70, 86, 20 98%RA  s/p [id number]

## 2021-03-11 NOTE — H&P ADULT - PROBLEM SELECTOR PLAN 2
YESSICA on CKD4  baseline Cr 1.95  HOLD diovan for now, c/w other antihypertensives. repeat CrCl to confirm. patient denies decreased urine output

## 2021-03-11 NOTE — H&P ADULT - PROBLEM SELECTOR PLAN 1
- CP reported earlier in the day but has since resolved. DENIES cp at time of medicien admit  - troponin delta is not c/w acs. maybe due to chronic myocardial injury related to CKD4  - EKG with TWI III & aVF  - cardiology consult  in am. tele overnight. trend troponin. TTE ordered

## 2021-03-11 NOTE — H&P ADULT - ATTENDING COMMENTS
Naga Conti MD  Medicine Attending  Department of Hospital Medicine  pager: [phone] (available from 20:00 to 08:00)

## 2021-03-11 NOTE — ED ADULT NURSE REASSESSMENT NOTE - NS ED NURSE REASSESS COMMENT FT1
Cardiac monitor in place, indicating NSR. No current c/o pain or discomfort. Denies any chest pain or SOB, no s/s distress. To be admitted to tele, pt verbalizes understanding of plan of care, all questions answered.

## 2021-03-11 NOTE — ED PROVIDER NOTE - CLINICAL SUMMARY MEDICAL DECISION MAKING FREE TEXT BOX
89M presents with exertional CP. TWI on EKG on III AVF. Will check serial ekgs, trops, labs, likely TBA for cardiac workup.

## 2021-03-11 NOTE — H&P ADULT - NSHPPHYSICALEXAM_GEN_ALL_CORE
Vital Signs Last 24 Hrs  T(C): 36.4 (11 Mar 2021 23:00), Max: 36.9 (11 Mar 2021 18:45)  T(F): 97.5 (11 Mar 2021 23:00), Max: 98.4 (11 Mar 2021 18:45)  HR: 84 (11 Mar 2021 23:00) (82 - 90)  BP: 176/68 (11 Mar 2021 23:00) (144/70 - 179/77)  BP(mean): 100 (11 Mar 2021 23:00) (100 - 107)  RR: 16 (11 Mar 2021 23:00) (16 - 20)  SpO2: 99% (11 Mar 2021 23:00) (96% - 100%) on RA    GENERAL: NAD, well-developed  HEAD:  Atraumatic, Normocephalic  EYES: EOMI, PERRL, conjunctiva and sclera clear  Mouth: MMM, no lesions  NECK: Supple, no appreciable masses  Lung: normal work of breathing, cta b/l  Chest: S1&S2+, rrr, no m/r/g appreciated  ABDOMEN: bs+, soft, nt, nd, no appreciable masses  : No macias catheter, no CVA tenderness  EXTREMITIES:  radial pulse present b/l, PT present b/l, minimal pitting edema below knee b/l  Neuro: A&Ox3, no flaccid paralysis in extremities appreciated  SKIN: warm and dry, no visible purulence in exposed areas

## 2021-03-12 ENCOUNTER — TRANSCRIPTION ENCOUNTER (OUTPATIENT)
Age: 86
End: 2021-03-12

## 2021-03-12 DIAGNOSIS — R06.00 DYSPNEA, UNSPECIFIED: ICD-10-CM

## 2021-03-12 LAB
ALBUMIN SERPL ELPH-MCNC: 3.6 G/DL — SIGNIFICANT CHANGE UP (ref 3.3–5)
ALP SERPL-CCNC: 60 U/L — SIGNIFICANT CHANGE UP (ref 40–120)
ALT FLD-CCNC: 22 U/L — SIGNIFICANT CHANGE UP (ref 10–45)
ANION GAP SERPL CALC-SCNC: 11 MMOL/L — SIGNIFICANT CHANGE UP (ref 5–17)
APTT BLD: 27.5 SEC — SIGNIFICANT CHANGE UP (ref 27.5–35.5)
AST SERPL-CCNC: 26 U/L — SIGNIFICANT CHANGE UP (ref 10–40)
BILIRUB SERPL-MCNC: 0.2 MG/DL — SIGNIFICANT CHANGE UP (ref 0.2–1.2)
BUN SERPL-MCNC: 36 MG/DL — HIGH (ref 7–23)
CALCIUM SERPL-MCNC: 9.4 MG/DL — SIGNIFICANT CHANGE UP (ref 8.4–10.5)
CHLORIDE SERPL-SCNC: 106 MMOL/L — SIGNIFICANT CHANGE UP (ref 96–108)
CK MB CFR SERPL CALC: 3.6 NG/ML — SIGNIFICANT CHANGE UP (ref 0–6.7)
CK SERPL-CCNC: 55 U/L — SIGNIFICANT CHANGE UP (ref 30–200)
CO2 SERPL-SCNC: 21 MMOL/L — LOW (ref 22–31)
CREAT SERPL-MCNC: 2.14 MG/DL — HIGH (ref 0.5–1.3)
GLUCOSE SERPL-MCNC: 95 MG/DL — SIGNIFICANT CHANGE UP (ref 70–99)
HCT VFR BLD CALC: 39.5 % — SIGNIFICANT CHANGE UP (ref 39–50)
HGB BLD-MCNC: 13.6 G/DL — SIGNIFICANT CHANGE UP (ref 13–17)
INR BLD: 0.9 RATIO — SIGNIFICANT CHANGE UP (ref 0.88–1.16)
MAGNESIUM SERPL-MCNC: 2.7 MG/DL — HIGH (ref 1.6–2.6)
MCHC RBC-ENTMCNC: 31.4 PG — SIGNIFICANT CHANGE UP (ref 27–34)
MCHC RBC-ENTMCNC: 34.4 GM/DL — SIGNIFICANT CHANGE UP (ref 32–36)
MCV RBC AUTO: 91.2 FL — SIGNIFICANT CHANGE UP (ref 80–100)
NRBC # BLD: 0 /100 WBCS — SIGNIFICANT CHANGE UP (ref 0–0)
NT-PROBNP SERPL-SCNC: 521 PG/ML — HIGH (ref 0–300)
PHOSPHATE SERPL-MCNC: 2.7 MG/DL — SIGNIFICANT CHANGE UP (ref 2.5–4.5)
PLATELET # BLD AUTO: 174 K/UL — SIGNIFICANT CHANGE UP (ref 150–400)
POTASSIUM SERPL-MCNC: 4.2 MMOL/L — SIGNIFICANT CHANGE UP (ref 3.5–5.3)
POTASSIUM SERPL-SCNC: 4.2 MMOL/L — SIGNIFICANT CHANGE UP (ref 3.5–5.3)
PROT SERPL-MCNC: 6.3 G/DL — SIGNIFICANT CHANGE UP (ref 6–8.3)
PROTHROM AB SERPL-ACNC: 10.9 SEC — SIGNIFICANT CHANGE UP (ref 10.6–13.6)
RBC # BLD: 4.33 M/UL — SIGNIFICANT CHANGE UP (ref 4.2–5.8)
RBC # FLD: 13.1 % — SIGNIFICANT CHANGE UP (ref 10.3–14.5)
SARS-COV-2 RNA SPEC QL NAA+PROBE: SIGNIFICANT CHANGE UP
SODIUM SERPL-SCNC: 138 MMOL/L — SIGNIFICANT CHANGE UP (ref 135–145)
TROPONIN T, HIGH SENSITIVITY RESULT: 70 NG/L — HIGH (ref 0–51)
WBC # BLD: 6.77 K/UL — SIGNIFICANT CHANGE UP (ref 3.8–10.5)
WBC # FLD AUTO: 6.77 K/UL — SIGNIFICANT CHANGE UP (ref 3.8–10.5)

## 2021-03-12 PROCEDURE — 99223 1ST HOSP IP/OBS HIGH 75: CPT

## 2021-03-12 PROCEDURE — 99233 SBSQ HOSP IP/OBS HIGH 50: CPT

## 2021-03-12 PROCEDURE — 93018 CV STRESS TEST I&R ONLY: CPT

## 2021-03-12 PROCEDURE — 93016 CV STRESS TEST SUPVJ ONLY: CPT

## 2021-03-12 PROCEDURE — 78452 HT MUSCLE IMAGE SPECT MULT: CPT | Mod: 26

## 2021-03-12 PROCEDURE — 99358 PROLONG SERVICE W/O CONTACT: CPT

## 2021-03-12 PROCEDURE — 93306 TTE W/DOPPLER COMPLETE: CPT | Mod: 26

## 2021-03-12 RX ORDER — ATORVASTATIN CALCIUM 80 MG/1
20 TABLET, FILM COATED ORAL AT BEDTIME
Refills: 0 | Status: DISCONTINUED | OUTPATIENT
Start: 2021-03-12 | End: 2021-03-15

## 2021-03-12 RX ORDER — FUROSEMIDE 40 MG
40 TABLET ORAL DAILY
Refills: 0 | Status: DISCONTINUED | OUTPATIENT
Start: 2021-03-12 | End: 2021-03-12

## 2021-03-12 RX ORDER — INFLUENZA VIRUS VACCINE 15; 15; 15; 15 UG/.5ML; UG/.5ML; UG/.5ML; UG/.5ML
0.5 SUSPENSION INTRAMUSCULAR ONCE
Refills: 0 | Status: COMPLETED | OUTPATIENT
Start: 2021-03-12 | End: 2021-03-12

## 2021-03-12 RX ADMIN — HEPARIN SODIUM 5000 UNIT(S): 5000 INJECTION INTRAVENOUS; SUBCUTANEOUS at 16:40

## 2021-03-12 RX ADMIN — PREGABALIN 1000 MICROGRAM(S): 225 CAPSULE ORAL at 11:49

## 2021-03-12 RX ADMIN — Medication 100 MILLIGRAM(S): at 16:40

## 2021-03-12 RX ADMIN — Medication 100 MILLIGRAM(S): at 11:49

## 2021-03-12 RX ADMIN — HEPARIN SODIUM 5000 UNIT(S): 5000 INJECTION INTRAVENOUS; SUBCUTANEOUS at 05:52

## 2021-03-12 RX ADMIN — Medication 81 MILLIGRAM(S): at 11:50

## 2021-03-12 RX ADMIN — Medication 100 MILLIGRAM(S): at 05:52

## 2021-03-12 RX ADMIN — HEPARIN SODIUM 5000 UNIT(S): 5000 INJECTION INTRAVENOUS; SUBCUTANEOUS at 21:18

## 2021-03-12 RX ADMIN — Medication 2000 UNIT(S): at 11:49

## 2021-03-12 RX ADMIN — Medication 40 MILLIGRAM(S): at 05:52

## 2021-03-12 RX ADMIN — FINASTERIDE 5 MILLIGRAM(S): 5 TABLET, FILM COATED ORAL at 21:18

## 2021-03-12 RX ADMIN — ATORVASTATIN CALCIUM 20 MILLIGRAM(S): 80 TABLET, FILM COATED ORAL at 21:18

## 2021-03-12 RX ADMIN — Medication 100 MILLIGRAM(S): at 21:18

## 2021-03-12 NOTE — PROGRESS NOTE ADULT - SUBJECTIVE AND OBJECTIVE BOX
Patient is a 89y old  Male who presents with a chief complaint of SOB, chest discomfort (12 Mar 2021 12:22)      INTERVAL HISTORY:     TELEMETRY Personally reviewed:    REVIEW OF SYSTEMS:   CONSTITUTIONAL: No weakness  EYES/ENT: No visual changes; No throat pain  Neck: No pain or stiffness  Respiratory: No cough, wheezing, No shortness of breath  CARDIOVASCULAR: no chest pain or palpitations  GASTROINTESTINAL: No abdominal pain, no nausea, vomiting or hematemesis  GENITOURINARY: No dysuria, frequency or hematuria  NEUROLOGICAL: No stroke like symptoms  SKIN: No rashes    	  MEDICATIONS:  furosemide   Injectable 40 milliGRAM(s) IV Push daily  hydrALAZINE 100 milliGRAM(s) Oral three times a day        PHYSICAL EXAM:  T(C): 36.7 (03-12-21 @ 09:13), Max: 36.9 (03-11-21 @ 18:45)  HR: 77 (03-12-21 @ 09:13) (68 - 90)  BP: 133/55 (03-12-21 @ 09:13) (116/59 - 179/77)  RR: 18 (03-12-21 @ 09:13) (16 - 20)  SpO2: 98% (03-12-21 @ 09:13) (96% - 100%)  Wt(kg): --  I&O's Summary    11 Mar 2021 07:01  -  12 Mar 2021 07:00  --------------------------------------------------------  IN: 0 mL / OUT: 300 mL / NET: -300 mL    12 Mar 2021 07:01  -  12 Mar 2021 13:53  --------------------------------------------------------  IN: 480 mL / OUT: 300 mL / NET: 180 mL      Height (cm): 162.6 (03-11 @ 15:07)  Weight (kg): 73.9 (03-11 @ 15:07)  BMI (kg/m2): 28 (03-11 @ 15:07)  BSA (m2): 1.79 (03-11 @ 15:07)    Appearance: In no distress	  HEENT:    PERRL, EOMI	  Cardiovascular:  S1 S2, No JVD  Respiratory: Lungs clear to auscultation	  Gastrointestinal:  Soft, Non-tender, + BS	  Vascularature:  No edema of LE  Psychiatric: Appropriate affect   Neuro: no acute focal deficits                       13.6   6.77  )-----------( 174      ( 12 Mar 2021 05:15 )             39.5     03-12    138  |  106  |  36<H>  ----------------------------<  95  4.2   |  21<L>  |  2.14<H>    Ca    9.4      12 Mar 2021 05:15  Phos  2.7     03-12  Mg     2.7     03-12    TPro  6.3  /  Alb  3.6  /  TBili  0.2  /  DBili  x   /  AST  26  /  ALT  22  /  AlkPhos  60  03-12  Labs personally reviewed  < from: Transthoracic Echocardiogram (03.12.21 @ 10:16) >  EF (Visual Estimate): >75 %    < end of copied text >  < from: Transthoracic Echocardiogram (03.12.21 @ 10:16) >  Conclusions:  1. Mitral annular calcification and calcified mitral  leaflets with normal diastolic opening. Minimal mitral  regurgitation.  2. Aortic valve not well visualized; appears to be a  calcified trileaflet valve with normal opening. Minimal  aortic regurgitation.  3. Hyperdynamic left ventricular systolic function. No  segmental wall motion abnormalities.  4. Normal right ventricular size and function.  *** Compared with echocardiogram of 4/10/2006, results are  similar on today's study.  ------------------------------------------------------------------------    < end of copied text >    ASSESSMENT/PLAN: 	  89M w/ HTN, CKDIV, enlarged prostate presents to [facility] for evaluation of cp admit to medicine for further evaluation.    Problem/Plan - 1:  ·  Problem: Chest pain/SOB.  Plan: - ?CP reported earlier in the day when walking, mostly CERVANTES/SOB,  but has since resolved.   - troponin delta is not c/w acs. CK/CKMB negative  - EKG with TWI III & aVF, no baseline EKG to compare to  - TTE- hyperdynamic LV Ef >75%    Problem/Plan - 2:  ·  Problem: SOB/CERVANTES.  Plan: + LE edema   c/w 40mg IV lasix daily      Problem/Plan - 3:  ·  Problem: Essential hypertension.  Plan: c/w home antihypertensives. holding diovan given yessica.     Problem/Plan - 4:  ·  Problem: YESSICA (acute kidney injury).  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 repeat CrCl to confirm. patient denies decreased urine output.         Maureen Castaneda ANP-C  Ubaldo Gordon DO [facility]  Cardiovascular Medicine  [address] [place] [address], [address]  Office: [phone]  Cell: [phone] Patient is a 89y old  Male who presents with a chief complaint of SOB, chest discomfort (12 Mar 2021 12:22)      INTERVAL HISTORY: at stress test    REVIEW OF SYSTEMS:   CONSTITUTIONAL: No weakness  EYES/ENT: No visual changes; No throat pain  Neck: No pain or stiffness  Respiratory: No cough, wheezing, No shortness of breath  CARDIOVASCULAR: no chest pain or palpitations  GASTROINTESTINAL: No abdominal pain, no nausea, vomiting or hematemesis  GENITOURINARY: No dysuria, frequency or hematuria  NEUROLOGICAL: No stroke like symptoms  SKIN: No rashes    	  MEDICATIONS:  furosemide   Injectable 40 milliGRAM(s) IV Push daily  hydrALAZINE 100 milliGRAM(s) Oral three times a day        PHYSICAL EXAM:  T(C): 36.7 (03-12-21 @ 09:13), Max: 36.9 (03-11-21 @ 18:45)  HR: 77 (03-12-21 @ 09:13) (68 - 90)  BP: 133/55 (03-12-21 @ 09:13) (116/59 - 179/77)  RR: 18 (03-12-21 @ 09:13) (16 - 20)  SpO2: 98% (03-12-21 @ 09:13) (96% - 100%)  Wt(kg): --  I&O's Summary    11 Mar 2021 07:01  -  12 Mar 2021 07:00  --------------------------------------------------------  IN: 0 mL / OUT: 300 mL / NET: -300 mL    12 Mar 2021 07:01  -  12 Mar 2021 13:53  --------------------------------------------------------  IN: 480 mL / OUT: 300 mL / NET: 180 mL      Height (cm): 162.6 (03-11 @ 15:07)  Weight (kg): 73.9 (03-11 @ 15:07)  BMI (kg/m2): 28 (03-11 @ 15:07)  BSA (m2): 1.79 (03-11 @ 15:07)    Appearance: In no distress	  HEENT:    PERRL, EOMI	  Cardiovascular:  S1 S2, No JVD  Respiratory: Lungs clear to auscultation	  Gastrointestinal:  Soft, Non-tender, + BS	  Vascularature:  No edema of LE  Psychiatric: Appropriate affect   Neuro: no acute focal deficits                       13.6   6.77  )-----------( 174      ( 12 Mar 2021 05:15 )             39.5     03-12    138  |  106  |  36<H>  ----------------------------<  95  4.2   |  21<L>  |  2.14<H>    Ca    9.4      12 Mar 2021 05:15  Phos  2.7     03-12  Mg     2.7     03-12    TPro  6.3  /  Alb  3.6  /  TBili  0.2  /  DBili  x   /  AST  26  /  ALT  22  /  AlkPhos  60  03-12  Labs personally reviewed  < from: Transthoracic Echocardiogram (03.12.21 @ 10:16) >  EF (Visual Estimate): >75 %    < end of copied text >  < from: Transthoracic Echocardiogram (03.12.21 @ 10:16) >  Conclusions:  1. Mitral annular calcification and calcified mitral  leaflets with normal diastolic opening. Minimal mitral  regurgitation.  2. Aortic valve not well visualized; appears to be a  calcified trileaflet valve with normal opening. Minimal  aortic regurgitation.  3. Hyperdynamic left ventricular systolic function. No  segmental wall motion abnormalities.  4. Normal right ventricular size and function.  *** Compared with echocardiogram of 4/10/2006, results are  similar on today's study.  ------------------------------------------------------------------------    < end of copied text >    ASSESSMENT/PLAN: 	  89M w/ HTN, CKDIV, enlarged prostate presents to [facility] for evaluation of cp admit to medicine for further evaluation.    Problem/Plan - 1:  ·  Problem: Chest pain/SOB.  Plan: - ?CP reported earlier in the day when walking, mostly CERVANTES/SOB,  but has since resolved.   - troponin delta is not c/w acs. CK/CKMB negative  - EKG with TWI III & aVF, no baseline EKG to compare to  - TTE- hyperdynamic LV Ef >75%, will consider dc lasix to avoid overdiuresis  - stress test today     Problem/Plan - 2:  ·  Problem: SOB/CERVANTES.  Plan: + LE edema   c/w 40mg IV lasix daily      Problem/Plan - 3:  ·  Problem: Essential hypertension.  Plan: c/w home antihypertensives. holding diovan given yessica.     Problem/Plan - 4:  ·  Problem: YESSICA (acute kidney injury).  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 repeat CrCl to confirm. patient denies decreased urine output.         Maureen Castaneda ANP-C  Ubaldo Gordon DO [facility]  Cardiovascular Medicine  [address] [place] [address], [address]  Office: [phone]  Cell: [phone]

## 2021-03-12 NOTE — DISCHARGE NOTE PROVIDER - CARE PROVIDER_API CALL
Walker Smith)  Cardiovascular Disease  [address] [place] [address]  Phone: [phone]  Fax: [phone]  Follow Up Time:     Madelin Morales  INTERNAL MEDICINE  [address] [facility] [address]  [place] [address]  Phone: [phone]  Fax: [phone]  Follow Up Time:

## 2021-03-12 NOTE — PROGRESS NOTE ADULT - PROBLEM SELECTOR PLAN 1
Exertional dyspnea with chest discomfort for a day, mild swelling of ankle. EKG- TWI III & aVF, Trop 76, 76, 70 with Scr 2.14.   - Appreciated Cardiology consult    Echo and Stress test in progress  - added IV Lasix 40mg daily  - c/w ASA, hydralazine, added low dose Statin  - daily I/O, weight

## 2021-03-12 NOTE — DISCHARGE NOTE PROVIDER - HOSPITAL COURSE
89M w/ HTN, CKDIV, enlarged prostate presents to [facility] for evaluation of cp. The patient was reports that when he walked to the end of his driveway to [employer] mail he developed sudden, generalized, chest pressure, 3/10, lasting seconds, nonradaiting, resolving with rest and then recurring when walking back to home and again resolving with rest, not associated with diaphoresis or sob. He denies having cp previously or hx of heart disease. No orthopnea or pnd reported. Does have baseline b/l lower extremity swelling for which he takes torsemide. He does not report daily asa use. No fever, chills, cough, n/v/d, or painful urination. he has completed vaccine series for COVID19 series with 2nd injection completed 1 month prior.    In the ED, VS 97.7, 144/70, 86, 20 98%RA  s/p [id number] (11 Mar 2021 23:06)   89M w/ HTN, CKDIV, enlarged prostate presents to [facility] for evaluation of cp. The patient was reports that when he walked to the end of his driveway to [email or web address] mail he developed sudden, generalized, chest pressure, 3/10, lasting seconds, nonradaiting, resolving with rest and then recurring when walking back to home and again resolving with rest, not associated with diaphoresis or sob. He denies having cp previously or hx of heart disease. No orthopnea or pnd reported. Does have baseline b/l lower extremity swelling for which he takes torsemide. he has completed vaccine series for COVID19 series with 2nd injection completed 1 month prior.   89M w/ HTN, CKDIV, enlarged prostate presents to [facility] for evaluation of cp. He denies having cp previously or hx of heart disease. No orthopnea or pnd reported. Does have baseline b/l lower extremity swelling for which he takes torsemide. He does not report daily asa use.  He has completed vaccine series for COVID19 series with 2nd injection completed 1 month prior.      Problem/Plan - 1:  ·  Problem: Essential hypertension.  Plan: BP uncontrolled 180s, asymptomatic  -Imdur 60mg daily  -Amlodipine 10mg daily   - c/w Hydralazine 100mg tid  - diovan on hold per cards/renal  - c/w toprol 50mg daily     Problem/Plan - 2:  ·  Problem: Dyspnea on exertion.  Plan: resolved, on room air  - Stress test positive but pt/family refusing cath, prefer medical management  - TTE hyperdynamic LV function, no WMA, hold torsemide per cards/renal.   - c/w ASA, statin     Problem/Plan - 3:  ·  Problem: YESSICA (acute kidney injury).  Plan: YESSICA on CKD3. Cr fluctuates 1.6-1.9, cr near baseline  - Nephrology recommendations appreciated  -Hold diuretics     DCP with medication reconciliation discussed with Dr. Aguilar.   Patient cleared for discharge home without skilled needs.   Follow up with PCP and cardiologist in 1 week.    89M w/ HTN, CKDIV, enlarged prostate presents to [facility] for evaluation of cp. He denies having cp previously or hx of heart disease. No orthopnea or pnd reported. Does have baseline b/l lower extremity swelling for which he takes torsemide. He does not report daily asa use.  He has completed vaccine series for COVID19 series with 2nd injection completed 1 month prior.      Problem/Plan - 1:  ·  Problem: Essential hypertension.  Plan: BP uncontrolled 180s, asymptomatic  -Imdur 60mg daily  -Amlodipine 10mg daily   - c/w Hydralazine 100mg tid  - diovan on hold per cards/renal  - c/w toprol 50mg daily     Problem/Plan - 2:  ·  Problem: Dyspnea on exertion.  Plan: resolved, on room air  - Stress test positive but pt/family refusing cath, prefer medical management  - TTE hyperdynamic LV function, no WMA, hold torsemide per cards/renal.   - c/w ASA, statin     Problem/Plan - 3:  ·  Problem: YESSICA (acute kidney injury).  Plan: YESSICA on CKD3. Cr fluctuates 1.6-1.9, cr near baseline  - Nephrology recommendations appreciated  -Hold diuretics   -renal US can be done a outpatient as cr near baseline, no s/s of retention    DCP with medication reconciliation discussed with Dr. Aguilar.   Patient cleared for discharge home without skilled needs.   Follow up with PCP and cardiologist in 1 week.

## 2021-03-12 NOTE — DISCHARGE NOTE PROVIDER - NSDCMRMEDTOKEN_GEN_ALL_CORE_FT
allopurinol 100 mg oral tablet: 1 tab(s) orally once a day  AREDS:   calcitriol 0.25 mcg oral capsule: 1 cap(s) orally every other day  Diovan 160 mg oral tablet: 1 tab(s) orally 2 times a day  finasteride 5 mg oral tablet: 1 tab(s) orally once a day  garlic:   hydrALAZINE 100 mg oral tablet: 1 tab(s) orally 3 times a day  pravastatin 20 mg oral tablet: 1 tab(s) orally once a day  torsemide 10 mg oral tablet: 1 tab(s) orally once a day  ubiquinol 100 mg/mL oral liquid:   Vitamin B-12 1000 mcg oral tablet: 1 tab(s) orally once a day  Vitamin D3 2000 intl units (50 mcg) oral tablet: 1 tab(s) orally once a day   allopurinol 100 mg oral tablet: 1 tab(s) orally once a day  amLODIPine 10 mg oral tablet: 1 tab(s) orally once a day  AREDS:   aspirin 81 mg oral delayed release tablet: 1 tab(s) orally once a day  calcitriol 0.25 mcg oral capsule: 1 cap(s) orally every other day  finasteride 5 mg oral tablet: 1 tab(s) orally once a day  garlic:   hydrALAZINE 100 mg oral tablet: 1 tab(s) orally 3 times a day  isosorbide mononitrate 60 mg oral tablet, extended release: 1 tab(s) orally once a day  metoprolol succinate 50 mg oral tablet, extended release: 1 tab(s) orally once a day  pravastatin 20 mg oral tablet: 1 tab(s) orally once a day  ubiquinol 100 mg/mL oral liquid:   Vitamin B-12 1000 mcg oral tablet: 1 tab(s) orally once a day  Vitamin D3 2000 intl units (50 mcg) oral tablet: 1 tab(s) orally once a day

## 2021-03-12 NOTE — PROGRESS NOTE ADULT - PROBLEM SELECTOR PLAN 3
BP has been stable  - c/w Hydralazine 100mg tid    Held Diovan  - c/w IV Lasix 40mg daily  - Echo report pending

## 2021-03-12 NOTE — PROGRESS NOTE ADULT - SUBJECTIVE AND OBJECTIVE BOX
Mohsin Khan, MD  Attending Physician, Division Of Hospital Medicine  Pager: [phone], Office: [phone]  Off hour pager: [phone]    Patient is a 89y old  Male who presents with a chief complaint of 89M p/w SOB, chest discomfort    SUBJECTIVE / OVERNIGHT EVENTS:  Seen, examined the patient this am  Resting in bed, c/o exertional dyspnea, chest discomfort for a day, no cough, fever. Tele- SR- controlled rate    MEDICATIONS  (STANDING):  allopurinol 100 milliGRAM(s) Oral daily  aspirin enteric coated 81 milliGRAM(s) Oral daily  calcitriol   Capsule 0.25 MICROGram(s) Oral <User Schedule>  cholecalciferol 2000 Unit(s) Oral daily  cyanocobalamin 1000 MICROGram(s) Oral daily  finasteride 5 milliGRAM(s) Oral daily  furosemide   Injectable 40 milliGRAM(s) IV Push daily  heparin   Injectable 5000 Unit(s) SubCutaneous every 8 hours  hydrALAZINE 100 milliGRAM(s) Oral three times a day  influenza   Vaccine 0.5 milliLiter(s) IntraMuscular once    MEDICATIONS  (PRN):      Vital Signs Last 24 Hrs  T(C): 36.7 (12 Mar 2021 09:13), Max: 36.9 (11 Mar 2021 18:45)  T(F): 98.1 (12 Mar 2021 09:13), Max: 98.4 (11 Mar 2021 18:45)  HR: 77 (12 Mar 2021 09:13) (68 - 90)  BP: 133/55 (12 Mar 2021 09:13) (116/59 - 179/77)  BP(mean): 100 (11 Mar 2021 23:00) (100 - 107)  RR: 18 (12 Mar 2021 09:13) (16 - 20)  SpO2: 98% (12 Mar 2021 09:13) (96% - 100%)  CAPILLARY BLOOD GLUCOSE        I&O's Summary    11 Mar 2021 07:01  -  12 Mar 2021 07:00  --------------------------------------------------------  IN: 0 mL / OUT: 300 mL / NET: -300 mL    12 Mar 2021 07:01  -  12 Mar 2021 12:23  --------------------------------------------------------  IN: 480 mL / OUT: 300 mL / NET: 180 mL        PHYSICAL EXAM:-  GENERAL: NAD, well-developed  EYES: EOMI, PERRLA, conjunctiva and sclera clear  NECK: Supple, No JVD, no thyromegaly  CHEST/LUNG: Clear to auscultation bilaterally; No wheeze  HEART: Regular rate and rhythm; S1, S2 audible, No murmurs, rubs, or gallops  ABDOMEN: Soft, Nontender, Nondistended; Bowel sounds present  EXTREMITIES:  2+ Peripheral Pulses, No clubbing, cyanosis, 1+ ankle edema  NEURO: AAOx3, no focal deficit      LABS:                        13.6   6.77  )-----------( 174      ( 12 Mar 2021 05:15 )             39.5     03-12    138  |  106  |  36<H>  ----------------------------<  95  4.2   |  21<L>  |  2.14<H>    Ca    9.4      12 Mar 2021 05:15  Phos  2.7     03-12  Mg     2.7     03-12    TPro  6.3  /  Alb  3.6  /  TBili  0.2  /  DBili  x   /  AST  26  /  ALT  22  /  AlkPhos  60  03-12    PT/INR - ( 12 Mar 2021 05:15 )   PT: 10.9 sec;   INR: 0.90 ratio         PTT - ( 12 Mar 2021 05:15 )  PTT:27.5 sec  CARDIAC MARKERS ( 12 Mar 2021 06:00 )  x     / x     / 55 U/L / x     / 3.6 ng/mL  CARDIAC MARKERS ( 11 Mar 2021 20:33 )  x     / x     / 72 U/L / x     / 4.3 ng/mL      Urinalysis Basic - ( 11 Mar 2021 20:32 )    Color: Yellow / Appearance: Clear / S.013 / pH: x  Gluc: x / Ketone: Negative  / Bili: Negative / Urobili: Negative   Blood: x / Protein: 100 mg/dL / Nitrite: Negative   Leuk Esterase: Negative / RBC: 0 /hpf / WBC 1 /HPF   Sq Epi: x / Non Sq Epi: 1 /hpf / Bacteria: Negative        RADIOLOGY & ADDITIONAL TESTS:    Imaging Personally Reviewed: CXR  Consultant(s) Notes Reviewed: Card    Care Discussed with Consultants/Other Providers: Card

## 2021-03-12 NOTE — DISCHARGE NOTE PROVIDER - NSDCCPCAREPLAN_GEN_ALL_CORE_FT
PRINCIPAL DISCHARGE DIAGNOSIS  Diagnosis: Positive cardiac stress test  Assessment and Plan of Treatment: You are declining cardiac cath at this time.   Continue medical management as prescribed.   See your cardiologist in 1 week.   Call your doctor if you have any new pain, pressure, or discomfort in the center of your chest, pain, tingling or discomfort in arms, back, neck, jaw, or stomach, shortness of breath, nausea, vomiting, burping or heartburn, sweating, cold and clammy skin, racing or abnormal heartbeat for more than 10 minutes or if they keep coming & going.  Call 911 and do not try to get to hospital by car  Make sure to keep appointments with doctor for cardiac follow up care        SECONDARY DISCHARGE DIAGNOSES  Diagnosis: Essential hypertension  Assessment and Plan of Treatment: Take your medications as prescribed  Follow up with your PCP in 1 week for ongoing management     PRINCIPAL DISCHARGE DIAGNOSIS  Diagnosis: Positive cardiac stress test  Assessment and Plan of Treatment: You are declining cardiac cath at this time.   Continue medical management as prescribed.   See your cardiologist in 1 week.   Call your doctor if you have any new pain, pressure, or discomfort in the center of your chest, pain, tingling or discomfort in arms, back, neck, jaw, or stomach, shortness of breath, nausea, vomiting, burping or heartburn, sweating, cold and clammy skin, racing or abnormal heartbeat for more than 10 minutes or if they keep coming & going.  Call 911 and do not try to get to hospital by car  Make sure to keep appointments with doctor for cardiac follow up care        SECONDARY DISCHARGE DIAGNOSES  Diagnosis: YESSICA (acute kidney injury)  Assessment and Plan of Treatment: yessica on ckd, crreatinine near baseline  outpatient pcp followu  renal uS can be done as outpatient    Diagnosis: Essential hypertension  Assessment and Plan of Treatment: Take your medications as prescribed  Follow up with your PCP in 1 week for ongoing management

## 2021-03-13 LAB
ANION GAP SERPL CALC-SCNC: 9 MMOL/L — SIGNIFICANT CHANGE UP (ref 5–17)
BUN SERPL-MCNC: 35 MG/DL — HIGH (ref 7–23)
CALCIUM SERPL-MCNC: 9 MG/DL — SIGNIFICANT CHANGE UP (ref 8.4–10.5)
CHLORIDE SERPL-SCNC: 107 MMOL/L — SIGNIFICANT CHANGE UP (ref 96–108)
CO2 SERPL-SCNC: 22 MMOL/L — SIGNIFICANT CHANGE UP (ref 22–31)
CREAT SERPL-MCNC: 2.17 MG/DL — HIGH (ref 0.5–1.3)
GLUCOSE SERPL-MCNC: 97 MG/DL — SIGNIFICANT CHANGE UP (ref 70–99)
HCT VFR BLD CALC: 38.4 % — LOW (ref 39–50)
HGB BLD-MCNC: 12.7 G/DL — LOW (ref 13–17)
MCHC RBC-ENTMCNC: 30.6 PG — SIGNIFICANT CHANGE UP (ref 27–34)
MCHC RBC-ENTMCNC: 33.1 GM/DL — SIGNIFICANT CHANGE UP (ref 32–36)
MCV RBC AUTO: 92.5 FL — SIGNIFICANT CHANGE UP (ref 80–100)
NRBC # BLD: 0 /100 WBCS — SIGNIFICANT CHANGE UP (ref 0–0)
NT-PROBNP SERPL-SCNC: 662 PG/ML — HIGH (ref 0–300)
PLATELET # BLD AUTO: 164 K/UL — SIGNIFICANT CHANGE UP (ref 150–400)
POTASSIUM SERPL-MCNC: 4.2 MMOL/L — SIGNIFICANT CHANGE UP (ref 3.5–5.3)
POTASSIUM SERPL-SCNC: 4.2 MMOL/L — SIGNIFICANT CHANGE UP (ref 3.5–5.3)
RBC # BLD: 4.15 M/UL — LOW (ref 4.2–5.8)
RBC # FLD: 13.1 % — SIGNIFICANT CHANGE UP (ref 10.3–14.5)
SODIUM SERPL-SCNC: 138 MMOL/L — SIGNIFICANT CHANGE UP (ref 135–145)
WBC # BLD: 5.78 K/UL — SIGNIFICANT CHANGE UP (ref 3.8–10.5)
WBC # FLD AUTO: 5.78 K/UL — SIGNIFICANT CHANGE UP (ref 3.8–10.5)

## 2021-03-13 PROCEDURE — 99233 SBSQ HOSP IP/OBS HIGH 50: CPT

## 2021-03-13 PROCEDURE — 99222 1ST HOSP IP/OBS MODERATE 55: CPT

## 2021-03-13 RX ORDER — METOPROLOL TARTRATE 50 MG
50 TABLET ORAL DAILY
Refills: 0 | Status: DISCONTINUED | OUTPATIENT
Start: 2021-03-13 | End: 2021-03-15

## 2021-03-13 RX ORDER — ISOSORBIDE MONONITRATE 60 MG/1
30 TABLET, EXTENDED RELEASE ORAL DAILY
Refills: 0 | Status: DISCONTINUED | OUTPATIENT
Start: 2021-03-13 | End: 2021-03-14

## 2021-03-13 RX ADMIN — Medication 100 MILLIGRAM(S): at 05:45

## 2021-03-13 RX ADMIN — ISOSORBIDE MONONITRATE 30 MILLIGRAM(S): 60 TABLET, EXTENDED RELEASE ORAL at 14:48

## 2021-03-13 RX ADMIN — HEPARIN SODIUM 5000 UNIT(S): 5000 INJECTION INTRAVENOUS; SUBCUTANEOUS at 07:21

## 2021-03-13 RX ADMIN — PREGABALIN 1000 MICROGRAM(S): 225 CAPSULE ORAL at 12:31

## 2021-03-13 RX ADMIN — Medication 100 MILLIGRAM(S): at 12:27

## 2021-03-13 RX ADMIN — Medication 100 MILLIGRAM(S): at 22:16

## 2021-03-13 RX ADMIN — HEPARIN SODIUM 5000 UNIT(S): 5000 INJECTION INTRAVENOUS; SUBCUTANEOUS at 14:48

## 2021-03-13 RX ADMIN — ATORVASTATIN CALCIUM 20 MILLIGRAM(S): 80 TABLET, FILM COATED ORAL at 22:16

## 2021-03-13 RX ADMIN — FINASTERIDE 5 MILLIGRAM(S): 5 TABLET, FILM COATED ORAL at 22:17

## 2021-03-13 RX ADMIN — Medication 81 MILLIGRAM(S): at 12:27

## 2021-03-13 RX ADMIN — Medication 100 MILLIGRAM(S): at 14:48

## 2021-03-13 RX ADMIN — Medication 50 MILLIGRAM(S): at 12:27

## 2021-03-13 RX ADMIN — Medication 2000 UNIT(S): at 12:27

## 2021-03-13 RX ADMIN — HEPARIN SODIUM 5000 UNIT(S): 5000 INJECTION INTRAVENOUS; SUBCUTANEOUS at 22:16

## 2021-03-13 NOTE — PROVIDER CONTACT NOTE (MEDICATION) - ASSESSMENT
Pt is confused at this time. VSS. Pt refusing heparin shot. Educated on risks and benefits. Pt still declines

## 2021-03-13 NOTE — CONSULT NOTE ADULT - SUBJECTIVE AND OBJECTIVE BOX
DATE OF SERVICE: 03-12-21 @ 00:18    CHIEF COMPLAINT:Patient is a 89y old  Male who presents with a chief complaint of 89M p/w CP (11 Mar 2021 23:06)      HISTORY OF PRESENT ILLNESS:HPI:  89M w/ HTN, CKDIV, enlarged prostate presents to [facility] for evaluation of cp. The patient was reports that when he walked to the end of his driveway to [facility] mail he developed sudden, generalized, chest pressure, 3/10, lasting seconds, nonradaiting, resolving with rest and then recurring when walking back to home and again resolving with rest, not associated with diaphoresis or sob. He denies having cp previously or hx of heart disease. No orthopnea or pnd reported. Does have baseline b/l lower extremity swelling for which he takes torsemide. He does not report daily asa use. No fever, chills, cough, n/v/d, or painful urination. he has completed vaccine series for COVID19 series with 2nd injection completed 1 month prior.    In the ED, VS 97.7, 144/70, 86, 20 98%RA  s/p [id number] (11 Mar 2021 23:06)      PAST MEDICAL & SURGICAL HISTORY:  Dyslipidemia    Hyperparathyroidism    Macular Degeneration  Left Eye    HTN - Hypertension    Cataract - left    ORIF  left ankle 1990            MEDICATIONS:  aspirin enteric coated 81 milliGRAM(s) Oral daily  furosemide   Injectable 40 milliGRAM(s) IV Push daily  heparin   Injectable 5000 Unit(s) SubCutaneous every 8 hours  hydrALAZINE 100 milliGRAM(s) Oral three times a day            allopurinol 100 milliGRAM(s) Oral daily  finasteride 5 milliGRAM(s) Oral daily    calcitriol   Capsule 0.25 MICROGram(s) Oral <User Schedule>  cholecalciferol 2000 Unit(s) Oral daily  cyanocobalamin 1000 MICROGram(s) Oral daily      FAMILY HISTORY:  No pertinent family history in first degree relatives        Non-contributory    SOCIAL HISTORY:    [ ] Tobacco  [ ] Drugs  [ ] Alcohol    Allergies    No Known Allergies    Intolerances    	    REVIEW OF SYSTEMS:  CONSTITUTIONAL: No fever  EYES: No eye pain, visual disturbances, or discharge  ENMT:  No difficulty hearing, tinnitus  NECK: No pain or stiffness  RESPIRATORY: No cough, wheezing,  CARDIOVASCULAR: No chest pain, palpitations, passing out, dizziness, or leg swelling  GASTROINTESTINAL:  No nausea, vomiting, diarrhea or constipation. No melena.  GENITOURINARY: No dysuria, hematuria  NEUROLOGICAL: No stroke like symptoms  SKIN: No burning or lesions   ENDOCRINE: No heat or cold intolerance  MUSCULOSKELETAL: No joint pain or swelling  PSYCHIATRIC: No  anxiety, mood swings  HEME/LYMPH: No bleeding gums  ALLERGY AND IMMUNOLOGIC: No hives or eczema	    All other ROS negative    PHYSICAL EXAM:  T(C): 36.4 (03-11-21 @ 23:00), Max: 36.9 (03-11-21 @ 18:45)  HR: 84 (03-11-21 @ 23:00) (82 - 90)  BP: 176/68 (03-11-21 @ 23:00) (144/70 - 179/77)  RR: 16 (03-11-21 @ 23:00) (16 - 20)  SpO2: 99% (03-11-21 @ 23:00) (96% - 100%)  Wt(kg): --  I&O's Summary      Appearance: Normal	  HEENT:   Normal oral mucosa, EOMI	  Cardiovascular:  S1 S2, No JVD,    Respiratory: Lungs clear to auscultation	  Psychiatry: Alert  Gastrointestinal:  Soft, Non-tender, + BS	  Skin: No rashes   Neurologic: Non-focal  Extremities:  No edema  Vascular: Peripheral pulses palpable    	    	  	  CARDIAC MARKERS:  Labs personally reviewed by me                                  14.0   7.00  )-----------( 205      ( 11 Mar 2021 19:00 )             41.5     03-11    137  |  104  |  39<H>  ----------------------------<  101<H>  4.4   |  21<L>  |  2.21<H>    Ca    9.9      11 Mar 2021 19:00    TPro  6.9  /  Alb  4.0  /  TBili  0.3  /  DBili  x   /  AST  26  /  ALT  24  /  AlkPhos  66  03-11          EKG: Personally reviewed by me - NSR EKG with TWI III & aVF  Radiology: Personally reviewed by me -       Assessment:  · Assessment	  89M w/ HTN, CKDIV, enlarged prostate presents to [facility] for evaluation of cp admit to medicine for further evaluation.    Problem/Plan - 1:  ·  Problem: Chest pain/SOB.  Plan: - ?CP reported earlier in the day when walking, mostly CERVANTES/SOB,  but has since resolved.   - troponin delta is not c/w acs. CK/CKMB negative  - EKG with TWI III & aVF, no baseline EKG to compare to  - TTE in AM    Problem/Plan - 2:  ·  Problem: SOB/CERVANTES.  Plan: + LE edmea, diurese with Lasix 40mg IV daily for now and reassess       Problem/Plan - 3:  ·  Problem: Essential hypertension.  Plan: c/w home antihypertensives. holding diovan given yessica.     Problem/Plan - 4:  ·  Problem: YESSICA (acute kidney injury).  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 repeat CrCl to confirm. patient denies decreased urine output.         Differential diagnosis and plan of care discussed with patient after the evaluation. Counseling on diet, nutritional counseling, weight management, exercise and medication compliance was done. Advanced care planning/advanced directives discussed with patient/family. DNR status including forceful chest compressions to attempt to restart the heart, ventilator support/artificial breathing, electric shock, artificial nutrition, health care proxy, Molst form all discussed with pt. Pt wishes to consider. More than fifteen minutes spent on discussing advanced directives.          Ubaldo Gordon DO [facility]  Cardiovascular Medicine  [address] [place] [address], [address]  Office [phone]  Cell [phone]
[facility] DIVISION OF KIDNEY DISEASES AND HYPERTENSION -- [phone]  -- INITIAL CONSULT NOTE  --------------------------------------------------------------------------------  HPI:  89M w/ HTN, CKDIV, enlarged prostate presents to [facility] for evaluation of cp, pending stress test. Nephrology on board for YESSICA on CKD.    3/12/2021  - Chart reviewed. Pt seen and examined.  - The patient denied any symptoms during my visit. No CP, no SOB.  - Denied dysuria, hematuria, flank pain, incontinence, dribbling, etc.      PAST HISTORY  --------------------------------------------------------------------------------  PAST MEDICAL & SURGICAL HISTORY:  Dyslipidemia    Hyperparathyroidism    Macular Degeneration  Left Eye    HTN - Hypertension    Cataract - left    ORIF  left ankle 1990      FAMILY HISTORY:  No pertinent family history in first degree relatives      PAST SOCIAL HISTORY:    ALLERGIES & MEDICATIONS  --------------------------------------------------------------------------------  Allergies    No Known Allergies    Intolerances      Standing Inpatient Medications  allopurinol 100 milliGRAM(s) Oral daily  aspirin enteric coated 81 milliGRAM(s) Oral daily  atorvastatin 20 milliGRAM(s) Oral at bedtime  calcitriol   Capsule 0.25 MICROGram(s) Oral <User Schedule>  cholecalciferol 2000 Unit(s) Oral daily  cyanocobalamin 1000 MICROGram(s) Oral daily  finasteride 5 milliGRAM(s) Oral daily  furosemide   Injectable 40 milliGRAM(s) IV Push daily  heparin   Injectable 5000 Unit(s) SubCutaneous every 8 hours  hydrALAZINE 100 milliGRAM(s) Oral three times a day  influenza   Vaccine 0.5 milliLiter(s) IntraMuscular once    PRN Inpatient Medications      REVIEW OF SYSTEMS  --------------------------------------------------------------------------------  Gen: No fevers/chills  Skin: No rashes  Head/Eyes/Ears: Normal hearing,   Respiratory: No dyspnea, cough  CV: No chest pain  GI: No abdominal pain, diarrhea  : No dysuria, hematuria  MSK: No  edema  Heme: No easy bruising or bleeding  Psych: No significant depression    All other systems were reviewed and are negative, except as noted.    VITALS/PHYSICAL EXAM  --------------------------------------------------------------------------------  T(C): 36.7 (03-12-21 @ 09:13), Max: 36.9 (03-11-21 @ 18:45)  HR: 77 (03-12-21 @ 09:13) (68 - 90)  BP: 133/55 (03-12-21 @ 09:13) (116/59 - 179/77)  RR: 18 (03-12-21 @ 09:13) (16 - 20)  SpO2: 98% (03-12-21 @ 09:13) (96% - 100%)  Wt(kg): --  Height (cm): 162.6 (03-11-21 @ 15:07)  Weight (kg): 73.9 (03-11-21 @ 15:07)  BMI (kg/m2): 28 (03-11-21 @ 15:07)  BSA (m2): 1.79 (03-11-21 @ 15:07)      03-11-21 @ 07:01  -  03-12-21 @ 07:00  --------------------------------------------------------  IN: 0 mL / OUT: 300 mL / NET: -300 mL    03-12-21 @ 07:01  -  03-12-21 @ 14:00  --------------------------------------------------------  IN: 480 mL / OUT: 300 mL / NET: 180 mL      Physical Exam:  	Gen: NAD  	HEENT: ETHAN  	Pulm: CTA B/L  	CV: S1S2  	Abd: Soft, +BS   	Ext: No LE edema B/L  	Neuro: Awake  	Skin: Warm and dry  	Vascular access:    LABS/STUDIES  --------------------------------------------------------------------------------              13.6   6.77  >-----------<  174      [03-12-21 @ 05:15]              39.5     138  |  106  |  36  ----------------------------<  95      [03-12-21 @ 05:15]  4.2   |  21  |  2.14        Ca     9.4     [03-12-21 @ 05:15]      Mg     2.7     [03-12-21 @ 05:15]      Phos  2.7     [03-12-21 @ 05:15]    TPro  6.3  /  Alb  3.6  /  TBili  0.2  /  DBili  x   /  AST  26  /  ALT  22  /  AlkPhos  60  [03-12-21 @ 05:15]    PT/INR: PT 10.9 , INR 0.90       [03-12-21 @ 05:15]  PTT: 27.5       [03-12-21 @ 05:15]    CK 55      [03-12-21 @ 06:00]    Creatinine Trend:  SCr 2.14 [03-12 @ 05:15]  SCr 2.21 [03-11 @ 19:00]    Urinalysis - [03-11-21 @ 20:32]      Color Yellow / Appearance Clear / SG 1.013 / pH 6.5      Gluc Negative / Ketone Negative  / Bili Negative / Urobili Negative       Blood Negative / Protein 100 mg/dL / Leuk Est Negative / Nitrite Negative      RBC 0 / WBC 1 / Hyaline 1 / Gran  / Sq Epi  / Non Sq Epi 1 / Bacteria Negative      --------------------------------------------------------------------------------------------------------------------  NEPHROLOGY CARE COORDINATION DOCUMENTATION  [x] Inpatient Consult  [ ] Other:  --------------------------------------------------------------------------------------------------------------------  KNOWN CKD: X[ ] YES  [ ] NO  BASELINE SCr: 1.7 TO 1.95  ------------------------------------------------------------------------  MEDICATION REVIEW:  --- Known exposure to NSAIDS, contrast, other known nephrotoxins: [ ] YES  [ x] NO  ------------------------------------------------------------------------  DIAGNOSTIC REVIEW:  --- Evidence of obstruction: [ ] YES  [ ] NO  --- UA findings reviewed: [X ] YES  [ ] NO  --- Hemodynamics reviewed: [ X] YES  [ ] NO  ------------------------------------------------------------------------  COORDINATION OF CARE:  --- Nephrology consulted for: YESSICA  --- Patient assessed: 3/12  --- Patient previously seen by Nephroogy: NO  ------------------------------------------------------------------------  CARE PROVIDER DOCUMENTATION:  --- ECHO: Reviewed  --- CARDS: Stress test today  --- Outpatient meds reviewed/ labs reviewed  PLAN OF CARE  --- Known admissions in past year: 1  --- Current admit date: 3/11  --- LOS: 1  --- LACE score:  9  ------------------------------------------------------------------------  --- Chart reviewed: 30 Minutes [including time used to gather, review and transfer data to this note]  --- Start: 1:30  --- End: 2:00  Prolonged services rendered, as part of this patient's care provided by Nephrology, include: i.chart review for provider and ancillary service documentation, ii.pertinent diagnostics including laboratory and imaging studies,iii. medication review including PRN use, iv. admission history including previous encounters and notes. Part of Nephrology extended evaluation and management also involves coordination of care with our IDT, the primary and consulting teams. Recommendations based on the information gathered and discussed are outlined in the A&P of our notes.    ************************************************************************              
  CHIEF COMPLAINT: Patient is a 89y old  Male who presents with a chief complaint of 89M p/w CP (12 Mar 2021 23:17)      HPI: We were called for a second opinion by family  89M w/ HTN, CKDIV, enlarged prostate presents to [facility] for evaluation of cp. The patient was reports that when he walked to the end of his driveway to [employer] mail he developed sudden, generalized, chest pressure, 3/10, lasting seconds, nonradaiting, resolving with rest and then recurring when walking back to home and again resolving with rest, not associated with diaphoresis or sob. He denies having cp previously or hx of heart disease. No orthopnea or pnd reported. Does have baseline b/l lower extremity swelling for which he takes torsemide. He does not report daily asa use. No fever, chills, cough, n/v/d, or painful urination. he has completed vaccine series for COVID19 series with 2nd injection completed 1 month prior.    In the ED, VS 97.7, 144/70, 86, 20 98%RA  s/p [id number] (11 Mar 2021 23:06)    Interval hx: Patient seen and examined. Events noted. Resting comfortably in bed. No complaints of chest pain, dyspnea, or palpitations reported. No signs of orthopnea or PND.   Nuclear stress with mild distal anterior and apical ischemia     EKG:   < from: 12 Lead ECG (21 @ 15:17) >  NORMAL SINUS RHYTHM  NONSPECIFIC ST ABNORMALITY  ABNORMAL ECG    < end of copied text >    REVIEW OF SYSTEMS:   All other review of systems are negative unless indicated above    PAST MEDICAL & SURGICAL HISTORY:  Dyslipidemia    Hyperparathyroidism    Macular Degeneration  Left Eye    HTN - Hypertension    Cataract - left    ORIF  left ankle         SOCIAL HISTORY:  No tobacco, ethanol, or drug abuse.    FAMILY HISTORY:  No pertinent family history in first degree relatives      No family history of acute MI or sudden cardiac death.    MEDICATIONS  (STANDING):  allopurinol 100 milliGRAM(s) Oral daily  aspirin enteric coated 81 milliGRAM(s) Oral daily  atorvastatin 20 milliGRAM(s) Oral at bedtime  calcitriol   Capsule 0.25 MICROGram(s) Oral <User Schedule>  cholecalciferol 2000 Unit(s) Oral daily  cyanocobalamin 1000 MICROGram(s) Oral daily  finasteride 5 milliGRAM(s) Oral daily  heparin   Injectable 5000 Unit(s) SubCutaneous every 8 hours  hydrALAZINE 100 milliGRAM(s) Oral three times a day  metoprolol succinate ER 50 milliGRAM(s) Oral daily    MEDICATIONS  (PRN):      Allergies    No Known Allergies    Intolerances        Home meds:  Home Medications:  allopurinol 100 mg oral tablet: 1 tab(s) orally once a day (11 Mar 2021 22:58)  AREDS:  (11 Mar 2021 22:58)  calcitriol 0.25 mcg oral capsule: 1 cap(s) orally every other day (11 Mar 2021 22:58)  Diovan 160 mg oral tablet: 1 tab(s) orally 2 times a day (11 Mar 2021 22:58)  finasteride 5 mg oral tablet: 1 tab(s) orally once a day (11 Mar 2021 22:58)  garlic:  (11 Mar 2021 22:58)  hydrALAZINE 100 mg oral tablet: 1 tab(s) orally 3 times a day (11 Mar 2021 22:58)  pravastatin 20 mg oral tablet: 1 tab(s) orally once a day (11 Mar 2021 22:58)  torsemide 10 mg oral tablet: 1 tab(s) orally once a day (11 Mar 2021 22:58)  ubiquinol 100 mg/mL oral liquid:  (11 Mar 2021 22:58)  Vitamin B-12 1000 mcg oral tablet: 1 tab(s) orally once a day (11 Mar 2021 22:58)  Vitamin D3 2000 intl units (50 mcg) oral tablet: 1 tab(s) orally once a day (11 Mar 2021 22:58)        VITAL SIGNS:   Vital Signs Last 24 Hrs  T(C): 36.9 (13 Mar 2021 12:25), Max: 36.9 (13 Mar 2021 12:25)  T(F): 98.4 (13 Mar 2021 12:25), Max: 98.4 (13 Mar 2021 12:25)  HR: 90 (13 Mar 2021 12:25) (76 - 90)  BP: 137/50 (13 Mar 2021 12:25) (137/50 - 186/73)  BP(mean): --  RR: 18 (13 Mar 2021 12:25) (15 - 18)  SpO2: 95% (13 Mar 2021 12:25) (95% - 97%)    I&O's Summary    12 Mar 2021 07:01  -  13 Mar 2021 07:00  --------------------------------------------------------  IN: 480 mL / OUT: 1200 mL / NET: -720 mL    13 Mar 2021 06:01  -  13 Mar 2021 12:47  --------------------------------------------------------  IN: 240 mL / OUT: 0 mL / NET: 240 mL        On Exam:  TELE: SR   Constitutional: NAD, awake    HEENT: Moist Mucous Membranes, Anicteric  Pulmonary: Decreased breath sounds b/l. No rales, crackles or wheeze appreciated.   Cardiovascular: Regular, S1 and S2, No murmurs, rubs, gallops or clicks  Gastrointestinal: Bowel Sounds present, soft, nontender.   Lymph: No peripheral edema. No lymphadenopathy.  Skin: No visible rashes or ulcers.  Psych:  Mood & affect appropriate    LABS: All Labs Reviewed:                        12.7   5.78  )-----------( 164      ( 13 Mar 2021 01:57 )             38.4                         13.6   6.77  )-----------( 174      ( 12 Mar 2021 05:15 )             39.5                         14.0   7.00  )-----------( 205      ( 11 Mar 2021 19:00 )             41.5     13 Mar 2021 01:57    138    |  107    |  35     ----------------------------<  97     4.2     |  22     |  2.17   12 Mar 2021 05:15    138    |  106    |  36     ----------------------------<  95     4.2     |  21     |  2.14   11 Mar 2021 19:00    137    |  104    |  39     ----------------------------<  101    4.4     |  21     |  2.21     Ca    9.0        13 Mar 2021 01:57  Ca    9.4        12 Mar 2021 05:15  Ca    9.9        11 Mar 2021 19:00  Phos  2.7       12 Mar 2021 05:15  Mg     2.7       12 Mar 2021 05:15    TPro  6.3    /  Alb  3.6    /  TBili  0.2    /  DBili  x      /  AST  26     /  ALT  22     /  AlkPhos  60     12 Mar 2021 05:15  TPro  6.9    /  Alb  4.0    /  TBili  0.3    /  DBili  x      /  AST  26     /  ALT  24     /  AlkPhos  66     11 Mar 2021 19:00    PT/INR - ( 12 Mar 2021 05:15 )   PT: 10.9 sec;   INR: 0.90 ratio         PTT - ( 12 Mar 2021 05:15 )  PTT:27.5 sec  CARDIAC MARKERS ( 12 Mar 2021 06:00 )  x     / x     / 55 U/L / x     / 3.6 ng/mL  CARDIAC MARKERS ( 11 Mar 2021 20:33 )  x     / x     / 72 U/L / x     / 4.3 ng/mL      Blood Culture:   -13 @ 01:57  Pro Bnp 662   @ 06:00  Pro Bnp 521        RADIOLOGY:    < from: Transthoracic Echocardiogram (21 @ 10:16) >    Patient name: Strelisker, Stanley  YOB: 1929   Age: 89 (M)   MR#: [id number]  Study Date: 3/12/2021  [facility]  Study quality: Technically fair  Referring Physician: Porfirio Smith MD  Blood Pressure: 133/55 mmHg  Height: 163 cm  Weight: 74 kg  BSA: 1.8 m2  ------------------------------------------------------------------------  PROCEDURE: Transthoracic echocardiogram with 2-D, M-Mode  and complete spectral and color flow Doppler.  INDICATION: Chest pain, unspecified (R07.9)  ------------------------------------------------------------------------  Dimensions:    Normal Values:  LA:     4.0    2.0 - 4.0 cm  Ao:     2.9    2.0 - 3.8 cm  SEPTUM: 0.9    0.6 - 1.2 cm  PWT:    0.8    0.6 - 1.1 cm  LVIDd:  4.0    3.0 - 5.6 cm  LVIDs:  2.5    1.8 - 4.0 cm  Derived variables:  LVMI: 57 g/m2  RWT: 0.40  Fractional short: 38 %  EF (Visual Estimate): >75 %  Doppler Peak Velocity (m/sec): AoV=2.5  ------------------------------------------------------------------------  Observations:  Mitral Valve: Mitral annular calcification and calcified  mitral leaflets with normal diastolic opening. Minimal  mitral regurgitation.  Aortic Valve/Aorta: Aortic valve not well visualized;  appears ai a calcified trileaflet valve with normal  opening. Peak transaortic valve gradient equals 9 mm Hg,  aortic valve velocity time integral equals 51 cm. Minimal  aortic regurgitation. Peak left ventricular outflow tract  gradient equals 16 mm Hg, mean gradient is equal to 8 mm  Hg, LVOT velocity time integral equals 37 cm.  Aortic Root: 2.9 cm.  Left Atrium: Normal left atrium.  LA volume index = 28  cc/m2. Mobile interatrial septum.  Left Ventricle: Hyperdynamic left ventricular systolic  function. No segmental wall motion abnormalities. Normal  left ventricular internal dimensions and wall thicknesses.  Normal diastolic function.  Right Heart: Normal right atrium. Normal right ventricular  size and function. Tricuspid valve not well visualized,  probably normal. Minimal tricuspid regurgitation. Normal  pulmonic valve.  Pericardium/Pleura: Normal pericardium with no pericardial  effusion.  Hemodynamic: Estimated right atrial pressure is 8 mm Hg.  Estimated right ventricular systolic pressure equals 36 mm  Hg, assuming right atrial pressure equals 8 mm Hg,  consistent with borderline pulmonary hypertension.  ------------------------------------------------------------------------  Conclusions:  1. Mitral annular calcification and calcified mitral  leaflets with normal diastolic opening. Minimal mitral  regurgitation.  2. Aortic valve not well visualized; appears to be a  calcified trileaflet valve with normal opening. Minimal  aortic regurgitation.  3. Hyperdynamic left ventricular systolic function. No  segmental wall motion abnormalities.  4. Normal right ventricular size and function.  *** Compared with echocardiogram of 4/10/2006, results are  similar on today's study.  ------------------------------------------------------------------------  Confirmed on  3/12/2021 - 12:24:33 by Merritt Roth M.D.  ------------------------------------------------------------------------    < end of copied text >      < from: Nuclear Stress Test-Pharmacologic (Nuclear Stress Test-Pharmacologic .) (21 @ 15:12) >    PATIENT: Strelisker, Stanley  : 1929   AGE: 89 (M)   MR#: [id number]  STUDY DATE: 2021  LOCATION[facility]  REF. PHYSICIAN(S): Porfirio Smith MD  FELLOW: Phong Weber M.D.  ------------------------------------------------------------------------  TYPE OF TEST: Dual Isotope Pharmacologic  INDICATION: Chest Pain (R07.9)  ------------------------------------------------------------------------  HISTORY:  CARDIAC HISTORY: 89 years old male with HTN, CKD, BPH,  presents for ischemic evaluation of chest pain.  OTHER HISTORY: ASVD  RISK FACTORS: Elevated Cholesterol, Hypertension  MEDICATIONS: atorvostatin, hydralazine, ASA, Lasix,  Allopurinol  ------------------------------------------------------------------------  BASELINE ELECTROCARDIOGRAM:  Rhythm: Sinus Rhythm - 84 BPM  ------------------------------------------------------------------------  HEMODYNAMIC PARAMETERS:                                      HR      BP  Baseline  Pre-Injection             83  156/64  00:00     Inject Regadenoson        83  156/64  00:30     Post Injection             0  01:00     Post Injection            98  157/66  02:00     Post Injection            95  120/54  03:00     Post Injection            95  120/53  04:00     Post Injection            91  135/69  05:00     Recovery                  86  137/56  06:00     Recovery                  84  147/58  07:00     Recovery                  86  146/58  08:00     Recovery                  85  129/58  ------------------------------------------------------------------------  Agent: Regadenoson 0.4 mg/5 ml NS. intravenously injected  over 10 sec.  HR: Baseline HR: 83 bpm   Peak HR: 98 bpm (76% of MPHR)  MPHR: 129 bpm   85% of MPHR: 110 bpm  BP: Baseline BP: 156/64 mmHg   Peak BP: 157/66 mmHg   Peak  RPP: [id number] (Rate Pressure Product)  Last Caffeine intake: 12 hrs  Terminated: Completion of protocol  ------------------------------------------------------------------------  SYMPTOMS/FINDINGS:  Symptoms: No Symptom  Chest pain: Nochest pain with administration of  Regadenoson  ------------------------------------------------------------------------  ECG ABNORMALITIES DURING/AFTER STRESS:   Abnormalities: None  ------------------------------------------------------------------------  NEW ARRHYTHMIAS DEVELOPED DURING/AFTER STRESS:  None  ------------------------------------------------------------------------  STRESS TEST IMPRESSIONS:  Chest Pain: No chest pain with administration of  Regadenoson.  Symptom: No Symptom.  HR Response: Appropriate.  BP Response: Appropriate.  Heart Rhythm: Sinus Rhythm - 84 BPM.  ECG Abnormalities: None.  Arrhythmia: None.  ------------------------------------------------------------------------  PROCEDURE:  2.9 mCi of TI-201 were injectedintravenously at rest.  Approximately 35 minutes later, tomographic images were  obtained in a 180 degree arc from right anterior oblique  to left anterior oblique with 32 stops. At a separate  time, 23.4 mCi of Tc99m Sestamibi were injected  intravenously during stress protocol. Approximately 60  minute(s) later, tomographic images were obtained in a 180  degree arc from right anterior oblique to left anterior  oblique with 64 stops. The tomographic slices were  reconstructed in 3 orthogonal planes (short axis,  horizontal long axis and vertical long axis).  Interpretation was performed both by visual and  quantitative analysis.  Stress images were acquired using CZT-based system with  pinhole collimation ([employer] 530 c, ROKT),  and reconstructed using MLEM algorithm. Images were  re-acquired with the patient in a prone position.  ------------------------------------------------------------------------  NUCLEAR FINDINGS:  The left ventricle was normal in size. There are medium  sized, moderate defects in distal anterior and apical,  septal walls that are reversible consistent with ischemia.  ------------------------------------------------------------------------  GATED ANALYSIS:  Post-stress gated wall motion analysis was performed (LVEF  = 70 %;LVEDV = 85 ml.), revealing mild hypokinesis in  distal anterior and apical walls.  ------------------------------------------------------------------------  IMPRESSIONS:Abnormal Study  * Chest Pain: No chest pain with administration of  Regadenoson.  * Symptom: No Symptom.  * HR Response: Appropriate.  * BP Response: Appropriate.  * Heart Rhythm: Sinus Rhythm - 84 BPM.  * ECG Abnormalities: None.  * Arrhythmia: None.  * The left ventricle was normal in size. There are medium  sized, moderate defects in distal anterior and apical,  septal walls that are reversible consistent with ischemia.  * Post-stress gated wall motion analysis was performed  (LVEF = 70 %;LVEDV = 85 ml.), revealing mild hypokinesis  in distal anterior and apical walls.  ------------------------------------------------------------------------  Confirmed on  3/12/2021 - 17:14:52 by Scar Cho M.D.  ------------------------------------------------------------------------    < end of copied text >

## 2021-03-13 NOTE — PROGRESS NOTE ADULT - PROBLEM SELECTOR PLAN 1
Exertional dyspnea with chest discomfort for a day, mild swelling of ankle. EKG- TWI III & aVF, Trop 76, 76, 70 with Scr 2.14.   - Stress test abnormal, echo hyperdynamic LV function, no WMA. Cardiology consult appreciated, likely medical management of disease. Patient and family hesitant to pursue cath, especially w/ underlying CKD which is reasonable. Metoprolol, imdur added today and diuretics discontinued.   - c/w ASA, hydralazine, added low dose Statin  - daily I/O, weight

## 2021-03-13 NOTE — PROGRESS NOTE ADULT - SUBJECTIVE AND OBJECTIVE BOX
[facility] Division of Hospital Medicine  Eileen Reyes DO pager [phone]    Patient is a 89y old  Male who presents with a chief complaint of 89M p/w CP (13 Mar 2021 12:46)      SUBJECTIVE / OVERNIGHT EVENTS:  No acute overnight events. This AM without CP, SOB, dizziness, palpitations.   ADDITIONAL REVIEW OF SYSTEMS:    MEDICATIONS  (STANDING):  allopurinol 100 milliGRAM(s) Oral daily  aspirin enteric coated 81 milliGRAM(s) Oral daily  atorvastatin 20 milliGRAM(s) Oral at bedtime  calcitriol   Capsule 0.25 MICROGram(s) Oral <User Schedule>  cholecalciferol 2000 Unit(s) Oral daily  cyanocobalamin 1000 MICROGram(s) Oral daily  finasteride 5 milliGRAM(s) Oral daily  heparin   Injectable 5000 Unit(s) SubCutaneous every 8 hours  hydrALAZINE 100 milliGRAM(s) Oral three times a day  isosorbide   mononitrate ER Tablet (IMDUR) 30 milliGRAM(s) Oral daily  metoprolol succinate ER 50 milliGRAM(s) Oral daily    MEDICATIONS  (PRN):      CAPILLARY BLOOD GLUCOSE        I&O's Summary    12 Mar 2021 07:01  -  13 Mar 2021 07:00  --------------------------------------------------------  IN: 480 mL / OUT: 1200 mL / NET: -720 mL    13 Mar 2021 06:01  -  13 Mar 2021 16:12  --------------------------------------------------------  IN: 240 mL / OUT: 0 mL / NET: 240 mL        PHYSICAL EXAM:  Vital Signs Last 24 Hrs  T(C): 36.9 (13 Mar 2021 15:38), Max: 36.9 (13 Mar 2021 12:25)  T(F): 98.4 (13 Mar 2021 15:38), Max: 98.4 (13 Mar 2021 12:25)  HR: 71 (13 Mar 2021 15:38) (71 - 90)  BP: 170/75 (13 Mar 2021 15:38) (137/50 - 186/73)  BP(mean): --  RR: 18 (13 Mar 2021 15:38) (15 - 18)  SpO2: 95% (13 Mar 2021 15:38) (95% - 97%)    GENERAL: NAD, well-developed, sitting up in bed  EYES: EOMI, PERRL, conjunctiva and sclera clear  NECK: Supple, No JVD, no thyromegaly  CHEST/LUNG: Clear to auscultation bilaterally; No wheeze  HEART: Regular rate and rhythm; S1, S2 audible, No murmurs, rubs, or gallops  ABDOMEN: Soft, Nontender, Nondistended; Bowel sounds present  EXTREMITIES:  2+ Peripheral Pulses, No clubbing, cyanosis, 1+ ankle edema  NEURO: AAOx3, no focal deficit    LABS:                        12.7   5.78  )-----------( 164      ( 13 Mar 2021 01:57 )             38.4     03-13    138  |  107  |  35<H>  ----------------------------<  97  4.2   |  22  |  2.17<H>    Ca    9.0      13 Mar 2021 01:57  Phos  2.7     03-12  Mg     2.7     03-12    TPro  6.3  /  Alb  3.6  /  TBili  0.2  /  DBili  x   /  AST  26  /  ALT  22  /  AlkPhos  60  03-12    PT/INR - ( 12 Mar 2021 05:15 )   PT: 10.9 sec;   INR: 0.90 ratio         PTT - ( 12 Mar 2021 05:15 )  PTT:27.5 sec  CARDIAC MARKERS ( 12 Mar 2021 06:00 )  x     / x     / 55 U/L / x     / 3.6 ng/mL  CARDIAC MARKERS ( 11 Mar 2021 20:33 )  x     / x     / 72 U/L / x     / 4.3 ng/mL      Urinalysis Basic - ( 11 Mar 2021 20:32 )    Color: Yellow / Appearance: Clear / S.013 / pH: x  Gluc: x / Ketone: Negative  / Bili: Negative / Urobili: Negative   Blood: x / Protein: 100 mg/dL / Nitrite: Negative   Leuk Esterase: Negative / RBC: 0 /hpf / WBC 1 /HPF   Sq Epi: x / Non Sq Epi: 1 /hpf / Bacteria: Negative          RADIOLOGY & ADDITIONAL TESTS:  Results Reviewed:   Imaging Personally Reviewed:  Electrocardiogram Personally Reviewed:    COORDINATION OF CARE:  Care Discussed with Consultants/Other Providers [Y/N]:  Prior or Outpatient Records Reviewed [Y/N]:

## 2021-03-13 NOTE — CONSULT NOTE ADULT - ASSESSMENT
89M w/ HTN, CKDIV, enlarged prostate presents to [facility] for evaluation of cp now with a positive stress test    - No signs of significant ischemia or volume overload.   - ekg without any worrisome changes    - echo with  hyperdynamic LV function   - Pt with a low risk abnormal stress test.   - I explained to the pt and his wife r/b/a to cath including but not limited to worsening CKD.   - they were unsure if they wanted to proceed with angiography at this time which is also reasonable.   - he needs to be started on OMT  - Start toprol 50mg Qday and titirate up  - I would start imdur 30mg Qday   - cont hydralazine.     - would dc diuretics given hyperdynamic lv fxn  - fu cr  - fu renal recs  - Further cardiac workup will depend on clinical course.    
89M w/ HTN, CKDIV, enlarged prostate presents to [facility] for evaluation of cp, pending stress test. Nephrology on board for YESSICA on CKD.    ----------------------------------------  # YESSICA  - U/A: reviewed  - SONO: Pending  --- RISK FACTORS:  1) Lab variation  2) Wide SCr fluctuations in setting of advanced CKD in elderly, likely vol mediated  3) r/o Obstruction  --- RECS:  > Urine lytes: Urina sodium, Urine creatinine, Urine urea  > Pls get dedicated renal sono to r/o obstruction  > Agree w/ holding Diovan for now    # DISORDERS OF FLUIDS AND ELECTROLYTES  - No critical acid-base, e- derangements    # CKD  - ANEMIA: Hgb >10  - SECONDARY HYPERPARATHYROIDISM: Ca, Phos WNL  - HTN: Within acceptable range. Hold Diovan.

## 2021-03-14 LAB
ANION GAP SERPL CALC-SCNC: 10 MMOL/L — SIGNIFICANT CHANGE UP (ref 5–17)
BUN SERPL-MCNC: 32 MG/DL — HIGH (ref 7–23)
CALCIUM SERPL-MCNC: 9 MG/DL — SIGNIFICANT CHANGE UP (ref 8.4–10.5)
CHLORIDE SERPL-SCNC: 107 MMOL/L — SIGNIFICANT CHANGE UP (ref 96–108)
CO2 SERPL-SCNC: 20 MMOL/L — LOW (ref 22–31)
CREAT SERPL-MCNC: 2.09 MG/DL — HIGH (ref 0.5–1.3)
GLUCOSE SERPL-MCNC: 82 MG/DL — SIGNIFICANT CHANGE UP (ref 70–99)
HCT VFR BLD CALC: 39.3 % — SIGNIFICANT CHANGE UP (ref 39–50)
HGB BLD-MCNC: 12.7 G/DL — LOW (ref 13–17)
MCHC RBC-ENTMCNC: 30.3 PG — SIGNIFICANT CHANGE UP (ref 27–34)
MCHC RBC-ENTMCNC: 32.3 GM/DL — SIGNIFICANT CHANGE UP (ref 32–36)
MCV RBC AUTO: 93.8 FL — SIGNIFICANT CHANGE UP (ref 80–100)
NRBC # BLD: 0 /100 WBCS — SIGNIFICANT CHANGE UP (ref 0–0)
PLATELET # BLD AUTO: 161 K/UL — SIGNIFICANT CHANGE UP (ref 150–400)
POTASSIUM SERPL-MCNC: 4.2 MMOL/L — SIGNIFICANT CHANGE UP (ref 3.5–5.3)
POTASSIUM SERPL-SCNC: 4.2 MMOL/L — SIGNIFICANT CHANGE UP (ref 3.5–5.3)
RBC # BLD: 4.19 M/UL — LOW (ref 4.2–5.8)
RBC # FLD: 13.2 % — SIGNIFICANT CHANGE UP (ref 10.3–14.5)
SODIUM SERPL-SCNC: 137 MMOL/L — SIGNIFICANT CHANGE UP (ref 135–145)
WBC # BLD: 6.2 K/UL — SIGNIFICANT CHANGE UP (ref 3.8–10.5)
WBC # FLD AUTO: 6.2 K/UL — SIGNIFICANT CHANGE UP (ref 3.8–10.5)

## 2021-03-14 PROCEDURE — 99232 SBSQ HOSP IP/OBS MODERATE 35: CPT

## 2021-03-14 RX ORDER — ISOSORBIDE MONONITRATE 60 MG/1
60 TABLET, EXTENDED RELEASE ORAL DAILY
Refills: 0 | Status: DISCONTINUED | OUTPATIENT
Start: 2021-03-14 | End: 2021-03-15

## 2021-03-14 RX ORDER — AMLODIPINE BESYLATE 2.5 MG/1
5 TABLET ORAL DAILY
Refills: 0 | Status: DISCONTINUED | OUTPATIENT
Start: 2021-03-14 | End: 2021-03-15

## 2021-03-14 RX ADMIN — AMLODIPINE BESYLATE 5 MILLIGRAM(S): 2.5 TABLET ORAL at 13:48

## 2021-03-14 RX ADMIN — PREGABALIN 1000 MICROGRAM(S): 225 CAPSULE ORAL at 11:55

## 2021-03-14 RX ADMIN — Medication 100 MILLIGRAM(S): at 11:55

## 2021-03-14 RX ADMIN — Medication 81 MILLIGRAM(S): at 11:55

## 2021-03-14 RX ADMIN — HEPARIN SODIUM 5000 UNIT(S): 5000 INJECTION INTRAVENOUS; SUBCUTANEOUS at 04:58

## 2021-03-14 RX ADMIN — HEPARIN SODIUM 5000 UNIT(S): 5000 INJECTION INTRAVENOUS; SUBCUTANEOUS at 21:45

## 2021-03-14 RX ADMIN — FINASTERIDE 5 MILLIGRAM(S): 5 TABLET, FILM COATED ORAL at 21:45

## 2021-03-14 RX ADMIN — Medication 100 MILLIGRAM(S): at 21:45

## 2021-03-14 RX ADMIN — Medication 2000 UNIT(S): at 11:56

## 2021-03-14 RX ADMIN — ISOSORBIDE MONONITRATE 30 MILLIGRAM(S): 60 TABLET, EXTENDED RELEASE ORAL at 11:56

## 2021-03-14 RX ADMIN — Medication 50 MILLIGRAM(S): at 06:46

## 2021-03-14 RX ADMIN — Medication 100 MILLIGRAM(S): at 13:48

## 2021-03-14 RX ADMIN — ATORVASTATIN CALCIUM 20 MILLIGRAM(S): 80 TABLET, FILM COATED ORAL at 21:44

## 2021-03-14 RX ADMIN — Medication 100 MILLIGRAM(S): at 04:58

## 2021-03-14 RX ADMIN — HEPARIN SODIUM 5000 UNIT(S): 5000 INJECTION INTRAVENOUS; SUBCUTANEOUS at 13:48

## 2021-03-14 RX ADMIN — CALCITRIOL 0.25 MICROGRAM(S): 0.5 CAPSULE ORAL at 09:42

## 2021-03-14 NOTE — PROGRESS NOTE ADULT - SUBJECTIVE AND OBJECTIVE BOX
[facility] -- Candy Yeung, Sarah, Mario, Soniya, Trevor Benitez  Office # [phone]      Follow Up:  CAD, CP    Subjective/Observations: Patient seen and examined. Events noted. Resting comfortably in bed. No complaints of chest pain, dyspnea, or palpitations reported. No signs of orthopnea or PND.       REVIEW OF SYSTEMS: All other review of systems is negative unless indicated above    PAST MEDICAL & SURGICAL HISTORY:  Dyslipidemia    Hyperparathyroidism    Macular Degeneration  Left Eye    HTN - Hypertension    Cataract - left    ORIF  left ankle 1990        MEDICATIONS  (STANDING):  allopurinol 100 milliGRAM(s) Oral daily  aspirin enteric coated 81 milliGRAM(s) Oral daily  atorvastatin 20 milliGRAM(s) Oral at bedtime  calcitriol   Capsule 0.25 MICROGram(s) Oral <User Schedule>  cholecalciferol 2000 Unit(s) Oral daily  cyanocobalamin 1000 MICROGram(s) Oral daily  finasteride 5 milliGRAM(s) Oral daily  heparin   Injectable 5000 Unit(s) SubCutaneous every 8 hours  hydrALAZINE 100 milliGRAM(s) Oral three times a day  isosorbide   mononitrate ER Tablet (IMDUR) 30 milliGRAM(s) Oral daily  metoprolol succinate ER 50 milliGRAM(s) Oral daily    MEDICATIONS  (PRN):      Allergies    No Known Allergies    Intolerances            Vital Signs Last 24 Hrs  T(C): 36.6 (14 Mar 2021 11:17), Max: 37.2 (13 Mar 2021 20:29)  T(F): 97.9 (14 Mar 2021 11:17), Max: 98.9 (13 Mar 2021 20:29)  HR: 58 (14 Mar 2021 11:17) (58 - 81)  BP: 167/72 (14 Mar 2021 11:17) (137/75 - 196/82)  BP(mean): --  RR: 18 (14 Mar 2021 11:17) (17 - 18)  SpO2: 97% (14 Mar 2021 11:17) (95% - 98%)    I&O's Summary    13 Mar 2021 06:01  -  14 Mar 2021 07:00  --------------------------------------------------------  IN: 240 mL / OUT: 300 mL / NET: -60 mL    14 Mar 2021 07:01  -  14 Mar 2021 12:29  --------------------------------------------------------  IN: 200 mL / OUT: 0 mL / NET: 200 mL          PHYSICAL EXAM:  TELE:    Constitutional: NAD, awake and alert, well-developed  HEENT: Moist Mucous Membranes, Anicteric  Pulmonary: Non-labored, breath sounds are clear bilaterally, No wheezing, rales or rhonchi  Cardiovascular: Regular, S1 and S2, No murmurs, rubs, gallops or clicks  Gastrointestinal: Bowel Sounds present, soft, nontender.   Lymph: No peripheral edema. No lymphadenopathy.  Skin: No visible rashes or ulcers.  Psych:  Mood & affect appropriate for situation    LABS: All Labs Reviewed:                        12.7   6.20  )-----------( 161      ( 14 Mar 2021 07:18 )             39.3                         12.7   5.78  )-----------( 164      ( 13 Mar 2021 01:57 )             38.4                         13.6   6.77  )-----------( 174      ( 12 Mar 2021 05:15 )             39.5     14 Mar 2021 07:17    137    |  107    |  32     ----------------------------<  82     4.2     |  20     |  2.09   13 Mar 2021 01:57    138    |  107    |  35     ----------------------------<  97     4.2     |  22     |  2.17   12 Mar 2021 05:15    138    |  106    |  36     ----------------------------<  95     4.2     |  21     |  2.14     Ca    9.0        14 Mar 2021 07:17  Ca    9.0        13 Mar 2021 01:57  Ca    9.4        12 Mar 2021 05:15  Phos  2.7       12 Mar 2021 05:15  Mg     2.7       12 Mar 2021 05:15    TPro  6.3    /  Alb  3.6    /  TBili  0.2    /  DBili  x      /  AST  26     /  ALT  22     /  AlkPhos  60     12 Mar 2021 05:15  TPro  6.9    /  Alb  4.0    /  TBili  0.3    /  DBili  x      /  AST  26     /  ALT  24     /  AlkPhos  66     11 Mar 2021 19:00

## 2021-03-14 NOTE — PROGRESS NOTE ADULT - PROBLEM SELECTOR PLAN 1
Exertional dyspnea with chest discomfort for a day, mild swelling of ankle. EKG- TWI III & aVF, Trop 76, 76, 70 with Scr 2.14.   - Stress test abnormal, echo hyperdynamic LV function, no WMA. Cardiology consult appreciated, medical management of disease. No need for diuretics   - Imdur up-titrated and norvasc added today  - c/w ASA, hydralazine, added low dose Statin

## 2021-03-14 NOTE — PROGRESS NOTE ADULT - SUBJECTIVE AND OBJECTIVE BOX
[facility] Division of Hospital Medicine  Eileen Reyes DO pager [phone]    Patient is a 89y old  Male who presents with a chief complaint of 89M p/w CP (14 Mar 2021 12:29)      SUBJECTIVE / OVERNIGHT EVENTS:  No acute overnight events. Feels well this AM - ambulating without CP, SOB, dizziness, palpitations.   ADDITIONAL REVIEW OF SYSTEMS:    MEDICATIONS  (STANDING):  allopurinol 100 milliGRAM(s) Oral daily  amLODIPine   Tablet 5 milliGRAM(s) Oral daily  aspirin enteric coated 81 milliGRAM(s) Oral daily  atorvastatin 20 milliGRAM(s) Oral at bedtime  calcitriol   Capsule 0.25 MICROGram(s) Oral <User Schedule>  cholecalciferol 2000 Unit(s) Oral daily  cyanocobalamin 1000 MICROGram(s) Oral daily  finasteride 5 milliGRAM(s) Oral daily  heparin   Injectable 5000 Unit(s) SubCutaneous every 8 hours  hydrALAZINE 100 milliGRAM(s) Oral three times a day  isosorbide   mononitrate ER Tablet (IMDUR) 60 milliGRAM(s) Oral daily  metoprolol succinate ER 50 milliGRAM(s) Oral daily    MEDICATIONS  (PRN):      CAPILLARY BLOOD GLUCOSE        I&O's Summary    13 Mar 2021 06:01  -  14 Mar 2021 07:00  --------------------------------------------------------  IN: 240 mL / OUT: 300 mL / NET: -60 mL    14 Mar 2021 07:01  -  14 Mar 2021 14:30  --------------------------------------------------------  IN: 400 mL / OUT: 0 mL / NET: 400 mL        PHYSICAL EXAM:  Vital Signs Last 24 Hrs  T(C): 36.6 (14 Mar 2021 11:17), Max: 37.2 (13 Mar 2021 20:29)  T(F): 97.9 (14 Mar 2021 11:17), Max: 98.9 (13 Mar 2021 20:29)  HR: 58 (14 Mar 2021 11:17) (58 - 81)  BP: 167/72 (14 Mar 2021 11:17) (137/75 - 196/82)  BP(mean): --  RR: 18 (14 Mar 2021 11:17) (17 - 18)  SpO2: 97% (14 Mar 2021 11:17) (95% - 98%)    GENERAL: NAD, well-developed, sitting on the side of the bed   EYES: EOMI, PERRL, conjunctiva and sclera clear  NECK: Supple, No JVD, no thyromegaly  CHEST/LUNG: Clear to auscultation bilaterally; No wheeze  HEART: Regular rate and rhythm; S1, S2 audible, No murmurs, rubs, or gallops  ABDOMEN: Soft, Nontender, Nondistended; Bowel sounds present  EXTREMITIES:  2+ Peripheral Pulses, No clubbing, cyanosis, 1+ ankle edema  NEURO: AAOx3, no focal deficit    LABS:                        12.7   6.20  )-----------( 161      ( 14 Mar 2021 07:18 )             39.3     03-14    137  |  107  |  32<H>  ----------------------------<  82  4.2   |  20<L>  |  2.09<H>    Ca    9.0      14 Mar 2021 07:17                  RADIOLOGY & ADDITIONAL TESTS:  Results Reviewed:   Imaging Personally Reviewed:  Electrocardiogram Personally Reviewed:    COORDINATION OF CARE:  Care Discussed with Consultants/Other Providers [Y/N]:  Prior or Outpatient Records Reviewed [Y/N]:

## 2021-03-15 ENCOUNTER — TRANSCRIPTION ENCOUNTER (OUTPATIENT)
Age: 86
End: 2021-03-15

## 2021-03-15 VITALS
HEART RATE: 60 BPM | RESPIRATION RATE: 18 BRPM | TEMPERATURE: 97 F | DIASTOLIC BLOOD PRESSURE: 72 MMHG | OXYGEN SATURATION: 98 % | SYSTOLIC BLOOD PRESSURE: 145 MMHG

## 2021-03-15 LAB
ANION GAP SERPL CALC-SCNC: 10 MMOL/L — SIGNIFICANT CHANGE UP (ref 5–17)
BUN SERPL-MCNC: 35 MG/DL — HIGH (ref 7–23)
CALCIUM SERPL-MCNC: 9.2 MG/DL — SIGNIFICANT CHANGE UP (ref 8.4–10.5)
CHLORIDE SERPL-SCNC: 108 MMOL/L — SIGNIFICANT CHANGE UP (ref 96–108)
CO2 SERPL-SCNC: 20 MMOL/L — LOW (ref 22–31)
CREAT SERPL-MCNC: 2.06 MG/DL — HIGH (ref 0.5–1.3)
GLUCOSE SERPL-MCNC: 80 MG/DL — SIGNIFICANT CHANGE UP (ref 70–99)
HCT VFR BLD CALC: 39 % — SIGNIFICANT CHANGE UP (ref 39–50)
HGB BLD-MCNC: 13.2 G/DL — SIGNIFICANT CHANGE UP (ref 13–17)
MAGNESIUM SERPL-MCNC: 2.6 MG/DL — SIGNIFICANT CHANGE UP (ref 1.6–2.6)
MCHC RBC-ENTMCNC: 30.8 PG — SIGNIFICANT CHANGE UP (ref 27–34)
MCHC RBC-ENTMCNC: 33.8 GM/DL — SIGNIFICANT CHANGE UP (ref 32–36)
MCV RBC AUTO: 91.1 FL — SIGNIFICANT CHANGE UP (ref 80–100)
NRBC # BLD: 0 /100 WBCS — SIGNIFICANT CHANGE UP (ref 0–0)
PLATELET # BLD AUTO: 168 K/UL — SIGNIFICANT CHANGE UP (ref 150–400)
POTASSIUM SERPL-MCNC: 4.4 MMOL/L — SIGNIFICANT CHANGE UP (ref 3.5–5.3)
POTASSIUM SERPL-SCNC: 4.4 MMOL/L — SIGNIFICANT CHANGE UP (ref 3.5–5.3)
RBC # BLD: 4.28 M/UL — SIGNIFICANT CHANGE UP (ref 4.2–5.8)
RBC # FLD: 12.9 % — SIGNIFICANT CHANGE UP (ref 10.3–14.5)
SODIUM SERPL-SCNC: 138 MMOL/L — SIGNIFICANT CHANGE UP (ref 135–145)
WBC # BLD: 5.49 K/UL — SIGNIFICANT CHANGE UP (ref 3.8–10.5)
WBC # FLD AUTO: 5.49 K/UL — SIGNIFICANT CHANGE UP (ref 3.8–10.5)

## 2021-03-15 PROCEDURE — 97116 GAIT TRAINING THERAPY: CPT

## 2021-03-15 PROCEDURE — 99239 HOSP IP/OBS DSCHRG MGMT >30: CPT

## 2021-03-15 PROCEDURE — 80053 COMPREHEN METABOLIC PANEL: CPT

## 2021-03-15 PROCEDURE — 84100 ASSAY OF PHOSPHORUS: CPT

## 2021-03-15 PROCEDURE — 85610 PROTHROMBIN TIME: CPT

## 2021-03-15 PROCEDURE — 78452 HT MUSCLE IMAGE SPECT MULT: CPT

## 2021-03-15 PROCEDURE — 82553 CREATINE MB FRACTION: CPT

## 2021-03-15 PROCEDURE — A9500: CPT

## 2021-03-15 PROCEDURE — 85730 THROMBOPLASTIN TIME PARTIAL: CPT

## 2021-03-15 PROCEDURE — 97530 THERAPEUTIC ACTIVITIES: CPT

## 2021-03-15 PROCEDURE — 83880 ASSAY OF NATRIURETIC PEPTIDE: CPT

## 2021-03-15 PROCEDURE — 99285 EMERGENCY DEPT VISIT HI MDM: CPT

## 2021-03-15 PROCEDURE — 82550 ASSAY OF CK (CPK): CPT

## 2021-03-15 PROCEDURE — 85027 COMPLETE CBC AUTOMATED: CPT

## 2021-03-15 PROCEDURE — 93017 CV STRESS TEST TRACING ONLY: CPT

## 2021-03-15 PROCEDURE — U0005: CPT

## 2021-03-15 PROCEDURE — U0003: CPT

## 2021-03-15 PROCEDURE — 93306 TTE W/DOPPLER COMPLETE: CPT

## 2021-03-15 PROCEDURE — A9505: CPT

## 2021-03-15 PROCEDURE — 80048 BASIC METABOLIC PNL TOTAL CA: CPT

## 2021-03-15 PROCEDURE — 99232 SBSQ HOSP IP/OBS MODERATE 35: CPT | Mod: GC

## 2021-03-15 PROCEDURE — 85025 COMPLETE CBC W/AUTO DIFF WBC: CPT

## 2021-03-15 PROCEDURE — 71045 X-RAY EXAM CHEST 1 VIEW: CPT

## 2021-03-15 PROCEDURE — 99232 SBSQ HOSP IP/OBS MODERATE 35: CPT

## 2021-03-15 PROCEDURE — 84484 ASSAY OF TROPONIN QUANT: CPT

## 2021-03-15 PROCEDURE — 83735 ASSAY OF MAGNESIUM: CPT

## 2021-03-15 PROCEDURE — 81001 URINALYSIS AUTO W/SCOPE: CPT

## 2021-03-15 RX ORDER — ISOSORBIDE MONONITRATE 60 MG/1
1 TABLET, EXTENDED RELEASE ORAL
Qty: 30 | Refills: 0
Start: 2021-03-15 | End: 2021-04-13

## 2021-03-15 RX ORDER — VALSARTAN 80 MG/1
1 TABLET ORAL
Qty: 0 | Refills: 0 | DISCHARGE

## 2021-03-15 RX ORDER — AMLODIPINE BESYLATE 2.5 MG/1
5 TABLET ORAL ONCE
Refills: 0 | Status: COMPLETED | OUTPATIENT
Start: 2021-03-15 | End: 2021-03-15

## 2021-03-15 RX ORDER — ISOSORBIDE MONONITRATE 60 MG/1
60 TABLET, EXTENDED RELEASE ORAL
Refills: 0 | Status: DISCONTINUED | OUTPATIENT
Start: 2021-03-15 | End: 2021-03-15

## 2021-03-15 RX ORDER — ISOSORBIDE MONONITRATE 60 MG/1
60 TABLET, EXTENDED RELEASE ORAL DAILY
Refills: 0 | Status: DISCONTINUED | OUTPATIENT
Start: 2021-03-16 | End: 2021-03-15

## 2021-03-15 RX ORDER — AMLODIPINE BESYLATE 2.5 MG/1
1 TABLET ORAL
Qty: 30 | Refills: 0
Start: 2021-03-15 | End: 2021-04-13

## 2021-03-15 RX ORDER — AMLODIPINE BESYLATE 2.5 MG/1
1 TABLET ORAL
Qty: 0 | Refills: 0 | DISCHARGE
Start: 2021-03-15 | End: 2021-04-13

## 2021-03-15 RX ORDER — AMLODIPINE BESYLATE 2.5 MG/1
10 TABLET ORAL DAILY
Refills: 0 | Status: DISCONTINUED | OUTPATIENT
Start: 2021-03-16 | End: 2021-03-15

## 2021-03-15 RX ORDER — METOPROLOL TARTRATE 50 MG
1 TABLET ORAL
Qty: 30 | Refills: 0
Start: 2021-03-15 | End: 2021-04-13

## 2021-03-15 RX ORDER — ASPIRIN/CALCIUM CARB/MAGNESIUM 324 MG
1 TABLET ORAL
Qty: 30 | Refills: 0
Start: 2021-03-15 | End: 2021-04-13

## 2021-03-15 RX ADMIN — Medication 100 MILLIGRAM(S): at 13:58

## 2021-03-15 RX ADMIN — Medication 100 MILLIGRAM(S): at 11:21

## 2021-03-15 RX ADMIN — Medication 100 MILLIGRAM(S): at 05:33

## 2021-03-15 RX ADMIN — HEPARIN SODIUM 5000 UNIT(S): 5000 INJECTION INTRAVENOUS; SUBCUTANEOUS at 05:33

## 2021-03-15 RX ADMIN — HEPARIN SODIUM 5000 UNIT(S): 5000 INJECTION INTRAVENOUS; SUBCUTANEOUS at 13:58

## 2021-03-15 RX ADMIN — ISOSORBIDE MONONITRATE 60 MILLIGRAM(S): 60 TABLET, EXTENDED RELEASE ORAL at 11:21

## 2021-03-15 RX ADMIN — Medication 2000 UNIT(S): at 11:21

## 2021-03-15 RX ADMIN — AMLODIPINE BESYLATE 5 MILLIGRAM(S): 2.5 TABLET ORAL at 11:21

## 2021-03-15 RX ADMIN — AMLODIPINE BESYLATE 5 MILLIGRAM(S): 2.5 TABLET ORAL at 05:33

## 2021-03-15 RX ADMIN — Medication 81 MILLIGRAM(S): at 11:21

## 2021-03-15 RX ADMIN — Medication 50 MILLIGRAM(S): at 05:33

## 2021-03-15 NOTE — PROGRESS NOTE ADULT - ATTENDING COMMENTS
Eileen Reyes DO  Hospitalist  [phone]    Possible d/c 3/15 if BP controlled.
spoke to wife- Angie and daughter over the phone his status 7 the plan of care
Pt care and plan discussed and reviewed with NP. Plan as outlined above edited by me to reflect our discussion.
I have seen this patient with the fellow and agree with their assessment and plan. I was physically present for significant portions of the evaluation and management (E/M) service provided.  I agree with the above history, physical, and plan which I have reviewed and edited where appropriate.    Abdullahi Em MD  Cell   Pager   Office 
Eileen Reyes DO  [facility]  [phone]

## 2021-03-15 NOTE — PROGRESS NOTE ADULT - PROBLEM SELECTOR PROBLEM 2
YESSICA (acute kidney injury)
YESSICA (acute kidney injury)
Dyspnea on exertion
YESSICA (acute kidney injury)

## 2021-03-15 NOTE — PHYSICAL THERAPY INITIAL EVALUATION ADULT - ACTIVE RANGE OF MOTION EXAMINATION, REHAB EVAL
Right UE Active ROM was WNL (within normal limits)/bilateral lower extremity Active ROM was WNL (within normal limits)/Left UE Active ROM was WFL (within functional limits)

## 2021-03-15 NOTE — PROGRESS NOTE ADULT - PROBLEM SELECTOR PLAN 3
YESSICA on CKD3. Cr fluctatees 1.6-1.9, cr near baseline  - Nephrology recommendations appreciated    Renal US pending  - hold diuretics YESSICA on CKD3. Cr fluctatees 1.6-1.9, cr near baseline  - Nephrology recommendations appreciated    Renal US pending, can be done as outpatient, no ss/ of retention  - hold diuretics

## 2021-03-15 NOTE — DISCHARGE NOTE NURSING/CASE MANAGEMENT/SOCIAL WORK - PATIENT PORTAL LINK FT
You can access the FollowMyHealth Patient Portal offered by [facility] by registering at the following website: [email or web address]. By joining [facility]’s FollowMyHealth portal, you will also be able to view your health information using other applications (apps) compatible with our system.

## 2021-03-15 NOTE — PHYSICAL THERAPY INITIAL EVALUATION ADULT - PRECAUTIONS/LIMITATIONS, REHAB EVAL
He denies having cp previously or hx of heart disease. No orthopnea or pnd reported. Does have baseline b/l lower extremity swelling for which he takes torsemide. He does not report daily asa use. No fever, chills, cough, n/v/d, or painful urination. he has completed vaccine series for COVID19 series with 2nd injection completed 1 month prior.  In the ED, VS 97.7, 144/70, 86, 20 98%RA He denies having cp previously or hx of heart disease. No orthopnea or pnd reported. Does have baseline b/l lower extremity swelling for which he takes torsemide. He does not report daily asa use. No fever, chills, cough, n/v/d, or painful urination. he has completed vaccine series for COVID19 series with 2nd injection completed 1 month prior.  In the ED, VS 97.7, 144/70, 86, 20 98%RA.  Stress test abnormal, echo hyperdynamic LV function, no WMA. Pt seen by cardiology- medical management of disease. No need for diuretics. He denies having cp previously or hx of heart disease. No orthopnea or pnd reported. Does have baseline b/l lower extremity swelling for which he takes torsemide. He does not report daily asa use. No fever, chills, cough, n/v/d, or painful urination. he has completed vaccine series for COVID19 series with 2nd injection completed 1 month prior.  In the ED, VS 97.7, 144/70, 86, 20 98%RA.  Stress test abnormal, echo hyperdynamic LV function, no WMA. Pt seen by cardiology- medical management of disease. No need for diuretics./no known precautions/limitations

## 2021-03-15 NOTE — PROGRESS NOTE ADULT - PROBLEM SELECTOR PLAN 1
BP uncontrolled 180s, asymptomatic  -per cards, increase imdur to 60mg bid and increase amlo to 10mg daily  - c/w Hydralazine 100mg tid  - diovan on hold per cards/renal  - c/w toprol 50mg eR daily  - diovan on hold, BP uncontrolled 180s, asymptomatic  -per cards, increase imdur to 60mg bid and increase amlo to 10mg daily  - c/w Hydralazine 100mg tid  - diovan on hold per cards/renal  - c/w toprol 50mg eR daily

## 2021-03-15 NOTE — PROGRESS NOTE ADULT - SUBJECTIVE AND OBJECTIVE BOX
Patient is a 89y old  Male who presents with a chief complaint of 89M p/w CP (15 Mar 2021 09:22)      SUBJECTIVE / OVERNIGHT EVENTS: No On events. Feels well, wants to go home. Walking around the unit. Denies cp, sob, palpitations, dizziness, n/v, headache.    Tele reviewed: sinus 50-70      ADDITIONAL REVIEW OF SYSTEMS: Negative except for above    MEDICATIONS  (STANDING):  allopurinol 100 milliGRAM(s) Oral daily  aspirin enteric coated 81 milliGRAM(s) Oral daily  atorvastatin 20 milliGRAM(s) Oral at bedtime  calcitriol   Capsule 0.25 MICROGram(s) Oral <User Schedule>  cholecalciferol 2000 Unit(s) Oral daily  cyanocobalamin 1000 MICROGram(s) Oral daily  finasteride 5 milliGRAM(s) Oral daily  heparin   Injectable 5000 Unit(s) SubCutaneous every 8 hours  hydrALAZINE 100 milliGRAM(s) Oral three times a day  isosorbide   mononitrate ER Tablet (IMDUR) 60 milliGRAM(s) Oral <User Schedule>  metoprolol succinate ER 50 milliGRAM(s) Oral daily    MEDICATIONS  (PRN):      CAPILLARY BLOOD GLUCOSE        I&O's Summary    14 Mar 2021 07:01  -  15 Mar 2021 07:00  --------------------------------------------------------  IN: 640 mL / OUT: 0 mL / NET: 640 mL        PHYSICAL EXAM:  Vital Signs Last 24 Hrs  T(C): 36.6 (15 Mar 2021 08:42), Max: 37.1 (15 Mar 2021 04:23)  T(F): 97.9 (15 Mar 2021 08:42), Max: 98.7 (15 Mar 2021 04:23)  HR: 66 (15 Mar 2021 11:23) (61 - 74)  BP: 183/81 (15 Mar 2021 11:23) (147/63 - 187/74)  BP(mean): --  RR: 18 (15 Mar 2021 08:42) (18 - 19)  SpO2: 98% (15 Mar 2021 11:23) (96% - 98%)    PHYSICAL EXAM:  GENERAL: NAD, well-developed  HEAD:  Atraumatic, Normocephalic  NECK: Supple, No JVD  CHEST/LUNG: Clear to auscultation bilaterally; No wheeze  HEART: Regular rate and rhythm; No murmurs, rubs, or gallops  ABDOMEN: Soft, Nontender, Nondistended; Bowel sounds present  EXTREMITIES:  2+ Peripheral Pulses, No clubbing, cyanosis, or edema  PSYCH: AAOx3  NEUROLOGY: non-focal  SKIN: No rashes or lesions      LABS:                        13.2   5.49  )-----------( 168      ( 15 Mar 2021 07:05 )             39.0     03-15    138  |  108  |  35<H>  ----------------------------<  80  4.4   |  20<L>  |  2.06<H>    Ca    9.2      15 Mar 2021 07:02  Mg     2.6     03-15                  RADIOLOGY & ADDITIONAL TESTS:    Imaging Personally Reviewed:    Electrocardiogram Personally Reviewed:    COORDINATION OF CARE:  Care Discussed with Consultants/Other Providers [Y/N]:  Prior or Outpatient Records Reviewed [Y/N]:

## 2021-03-15 NOTE — PROGRESS NOTE ADULT - SUBJECTIVE AND OBJECTIVE BOX
[facility] -- Candy Yeung, Sarah, Mario, Emmanuel Byrnes Savella  Office # [phone]      Follow Up:  CAD, HTN    Subjective/Observations:   Patient seen and examined. Events noted. Resting comfortably in bed. No complaints of chest pain, dyspnea, or palpitations reported. No signs of orthopnea or PND.     REVIEW OF SYSTEMS: All other review of systems is negative unless indicated above    PAST MEDICAL & SURGICAL HISTORY:  Dyslipidemia    Hyperparathyroidism    Macular Degeneration  Left Eye    HTN - Hypertension    Cataract - left    ORIF  left ankle 1990        MEDICATIONS  (STANDING):  allopurinol 100 milliGRAM(s) Oral daily  amLODIPine   Tablet 5 milliGRAM(s) Oral daily  aspirin enteric coated 81 milliGRAM(s) Oral daily  atorvastatin 20 milliGRAM(s) Oral at bedtime  calcitriol   Capsule 0.25 MICROGram(s) Oral <User Schedule>  cholecalciferol 2000 Unit(s) Oral daily  cyanocobalamin 1000 MICROGram(s) Oral daily  finasteride 5 milliGRAM(s) Oral daily  heparin   Injectable 5000 Unit(s) SubCutaneous every 8 hours  hydrALAZINE 100 milliGRAM(s) Oral three times a day  isosorbide   mononitrate ER Tablet (IMDUR) 60 milliGRAM(s) Oral daily  metoprolol succinate ER 50 milliGRAM(s) Oral daily    MEDICATIONS  (PRN):      Allergies    No Known Allergies    Intolerances            Vital Signs Last 24 Hrs  T(C): 37.1 (15 Mar 2021 04:23), Max: 37.1 (15 Mar 2021 04:23)  T(F): 98.7 (15 Mar 2021 04:23), Max: 98.7 (15 Mar 2021 04:23)  HR: 66 (15 Mar 2021 04:23) (58 - 67)  BP: 182/80 (15 Mar 2021 04:23) (147/63 - 182/80)  BP(mean): --  RR: 18 (15 Mar 2021 04:23) (18 - 19)  SpO2: 97% (15 Mar 2021 04:23) (96% - 97%)    I&O's Summary    14 Mar 2021 07:01  -  15 Mar 2021 07:00  --------------------------------------------------------  IN: 640 mL / OUT: 0 mL / NET: 640 mL          PHYSICAL EXAM:  TELE: SR  Constitutional: NAD, awake and alert, well-developed  HEENT: Moist Mucous Membranes, Anicteric  Pulmonary: Non-labored, breath sounds are clear bilaterally, No wheezing, rales or rhonchi  Cardiovascular: Regular, S1 and S2, No murmurs, rubs, gallops or clicks  Gastrointestinal: Bowel Sounds present, soft, nontender.   Lymph: No peripheral edema. No lymphadenopathy.  Skin: No visible rashes or ulcers.  Psych:  Mood & affect appropriate for situation    LABS: All Labs Reviewed:                        13.2   5.49  )-----------( 168      ( 15 Mar 2021 07:05 )             39.0                         12.7   6.20  )-----------( 161      ( 14 Mar 2021 07:18 )             39.3                         12.7   5.78  )-----------( 164      ( 13 Mar 2021 01:57 )             38.4     15 Mar 2021 07:02    138    |  108    |  35     ----------------------------<  80     4.4     |  20     |  2.06   14 Mar 2021 07:17    137    |  107    |  32     ----------------------------<  82     4.2     |  20     |  2.09   13 Mar 2021 01:57    138    |  107    |  35     ----------------------------<  97     4.2     |  22     |  2.17     Ca    9.2        15 Mar 2021 07:02  Ca    9.0        14 Mar 2021 07:17  Ca    9.0        13 Mar 2021 01:57  Mg     2.6       15 Mar 2021 07:02

## 2021-03-15 NOTE — PROGRESS NOTE ADULT - SUBJECTIVE AND OBJECTIVE BOX
[facility] Division of Kidney Diseases & Hypertension  FOLLOW UP NOTE  [phone]--------------------------------------------------------------------------------    HPI:  89M w/ HTN, CKDIV, enlarged prostate admitted to [facility] for evaluation of cp, s/p stress test. Nephrology on board for YESSICA on CKD.    3/15/2021  - Chart reviewed. Pt seen and examined.  - The patient denied any symptoms. No CP, no SOB.  - Denied dysuria, hematuria, flank pain, incontinence, dribbling, etc.    PAST HISTORY  --------------------------------------------------------------------------------  No significant changes to PMH, PSH, FHx, SHx, unless otherwise noted    ALLERGIES & MEDICATIONS  --------------------------------------------------------------------------------  Allergies    No Known Allergies    Intolerances    Standing Inpatient Medications  allopurinol 100 milliGRAM(s) Oral daily  aspirin enteric coated 81 milliGRAM(s) Oral daily  atorvastatin 20 milliGRAM(s) Oral at bedtime  calcitriol   Capsule 0.25 MICROGram(s) Oral <User Schedule>  cholecalciferol 2000 Unit(s) Oral daily  cyanocobalamin 1000 MICROGram(s) Oral daily  finasteride 5 milliGRAM(s) Oral daily  heparin   Injectable 5000 Unit(s) SubCutaneous every 8 hours  hydrALAZINE 100 milliGRAM(s) Oral three times a day  isosorbide   mononitrate ER Tablet (IMDUR) 60 milliGRAM(s) Oral <User Schedule>  metoprolol succinate ER 50 milliGRAM(s) Oral daily    VITALS/PHYSICAL EXAM  --------------------------------------------------------------------------------  T(C): 36.3 (03-15-21 @ 12:36), Max: 37.1 (03-15-21 @ 04:23)  HR: 60 (03-15-21 @ 13:57) (60 - 74)  BP: 113/54 (03-15-21 @ 13:57) (113/54 - 187/74)  RR: 18 (03-15-21 @ 12:36) (18 - 19)  SpO2: 97% (03-15-21 @ 12:36) (96% - 98%)  Wt(kg): --    03-14-21 @ 07:01  -  03-15-21 @ 07:00  --------------------------------------------------------  IN: 640 mL / OUT: 0 mL / NET: 640 mL    Physical Exam:              Gen: NAD  	HEENT: Anicteric  	Pulm: CTA B/L  	CV: S1S2  	Abd: Soft, +BS   	Ext: No LE edema B/L  	Neuro: Awake  	Skin: Warm and dry    LABS/STUDIES  --------------------------------------------------------------------------------              13.2   5.49  >-----------<  168      [03-15-21 @ 07:05]              39.0     138  |  108  |  35  ----------------------------<  80      [03-15-21 @ 07:02]  4.4   |  20  |  2.06        Ca     9.2     [03-15-21 @ 07:02]      Mg     2.6     [03-15-21 @ 07:02]    Creatinine Trend:  SCr 2.06 [03-15 @ 07:02]  SCr 2.09 [03-14 @ 07:17]  SCr 2.17 [03-13 @ 01:57]  SCr 2.14 [03-12 @ 05:15]  SCr 2.21 [03-11 @ 19:00]    Urinalysis - [03-11-21 @ 20:32]      Color Yellow / Appearance Clear / SG 1.013 / pH 6.5      Gluc Negative / Ketone Negative  / Bili Negative / Urobili Negative       Blood Negative / Protein 100 mg/dL / Leuk Est Negative / Nitrite Negative      RBC 0 / WBC 1 / Hyaline 1 / Gran  / Sq Epi  / Non Sq Epi 1 / Bacteria Negative

## 2021-03-15 NOTE — PHYSICAL THERAPY INITIAL EVALUATION ADULT - PLANNED THERAPY INTERVENTIONS, PT EVAL
Stair Negotiation Training: GOAL- Patient will be able to negotiate up & down 1 flight of stairs independently with single railing, step to gait pattern, in 1-3 sessions/balance training/gait training

## 2021-03-15 NOTE — PROGRESS NOTE ADULT - ASSESSMENT
89M w/ HTN, CKDIV, enlarged prostate presents to [facility] for evaluation of cp now with a positive stress test    - No signs of significant ischemia or volume overload.   - ekg without any worrisome changes    - echo with  hyperdynamic LV function   - Pt with a low risk abnormal stress test.   - I explained to the pt and his wife r/b/a to cath including but not limited to worsening CKD.   - after speaking with family, renal it has been decided to defer angiography until OMT fails.   - need bp control   - cont toprol 50mg Qday and titirate up  - increase  imdur 60mg Qday   - Add Norvasc 5mg Qday  - cont hydralazine.     - would dc diuretics given hyperdynamic lv fxn  - fu cr  - fu renal recs  - Further cardiac workup will depend on clinical course.   - if bp controlled dc home. likely evan
89M w/ HTN, CKDIV, enlarged prostate presents to [facility] for evaluation of cp now with a positive stress test    - No signs of significant ischemia or volume overload.   - ekg without any worrisome changes    - echo with  hyperdynamic LV function   - Pt with a low risk abnormal stress test.   - I explained to the pt and his wife r/b/a to cath including but not limited to worsening CKD.   - after speaking with family, renal it has been decided to defer angiography until OMT fails.   - he has been walking the halls with no symptoms    - need bp control   - cont toprol 50mg Qday    - increase  imdur 60mg Q12  - increase Norvasc 5mg Q12  - cont hydralazine.     - would dc diuretics given hyperdynamic lv fxn  - fu cr  - fu renal recs    - Further cardiac workup will depend on clinical course.   - if bp controlled dc home later today. 
# YESSICA - Baseline around 1.9-2.0  - U/A: reviewed  - SONO: Pending  --- RISK FACTORS:  1) Lab variation  2) Wide SCr fluctuations in setting of advanced CKD in elderly, likely vol mediated  3) r/o Obstruction  --- RECS:  > Urine lytes: If patient remains admitted please obtain urine lytes  > Pls get dedicated renal sono to r/o obstruction and evaluate kidney size  > Scr appears to be stable at baseline    # DISORDERS OF FLUIDS AND ELECTROLYTES  - No critical acid-base, e- derangements    # CKD  - ANEMIA: Hgb >10  - SECONDARY HYPERPARATHYROIDISM: Ca WNL, check phos with AM labs tomorrow  - HTN: Within acceptable range.
89M w/ HTN, CKDIV, enlarged prostate presents to [facility] for evaluation of cp admit to medicine for further evaluation.
89M w/ HTN, CKDIV, enlarged prostate presents to [facility] for evaluation of cp admit to medicine for further evaluation.
89M w/ HTN, CKDIV, enlarged prostate presents to [facility] for evaluation of cp admit to medicine for further evaluation.
89M w/ HTN, CKDIV, enlarged prostate presents to [facility] for evaluation of cp admit to medicine for further evaluation.

## 2021-03-15 NOTE — PHYSICAL THERAPY INITIAL EVALUATION ADULT - PERTINENT HX OF CURRENT PROBLEM, REHAB EVAL
89M w/ HTN, CKDIV, enlarged prostate presents to [facility] for evaluation of cp. The patient was reports that when he walked to the end of his driveway to [employer] mail he developed sudden, generalized, chest pressure, 3/10, lasting seconds, nonradaiting, resolving with rest and then recurring when walking back to home and again resolving with rest, not associated with diaphoresis or sob.

## 2021-03-15 NOTE — PROGRESS NOTE ADULT - PROBLEM SELECTOR PLAN 4
hsq for dvt ppx
hsq for dvt ppx  Dispo: discharge home later today if bP improved. Needs outpatient PCP/cards followup with BP check within 5 days  spent 40 min on d/c time

## 2021-03-15 NOTE — PROGRESS NOTE ADULT - PROBLEM SELECTOR PLAN 2
YESSICA on CKD3. Baseline Cr 1.95. Cr stable today  - Held Diovan  - Nephrology recommendations appreciated    Renal US ordered
YESSICA on CKD3, possible cardio-renal. Baseline Cr 1.95  - Held Diovan  - House Renal consult called    Renal US ordered
YESSICA on CKD3. Baseline Cr 1.95. Cr stable   - Held Diovan  - Nephrology recommendations appreciated    Renal US ordered
resolved, on room air  - Stress test positive but pt/family refusing cath, wants medical manangement  - TTE hyperdynamic LV function, no WMA, hold torsemide per cards/renal.   - c/w ASA, statin  - BP control as below

## 2021-03-16 ENCOUNTER — NON-APPOINTMENT (OUTPATIENT)
Age: 86
End: 2021-03-16

## 2021-03-20 ENCOUNTER — TRANSCRIPTION ENCOUNTER (OUTPATIENT)
Age: 86
End: 2021-03-20

## 2021-03-23 ENCOUNTER — APPOINTMENT (OUTPATIENT)
Dept: CARDIOLOGY | Facility: CLINIC | Age: 86
End: 2021-03-23
Payer: MEDICARE

## 2021-03-23 ENCOUNTER — NON-APPOINTMENT (OUTPATIENT)
Age: 86
End: 2021-03-23

## 2021-03-23 VITALS
OXYGEN SATURATION: 98 % | DIASTOLIC BLOOD PRESSURE: 64 MMHG | WEIGHT: 160 LBS | HEIGHT: 63.5 IN | HEART RATE: 49 BPM | BODY MASS INDEX: 28 KG/M2 | SYSTOLIC BLOOD PRESSURE: 148 MMHG

## 2021-03-23 PROCEDURE — 99214 OFFICE O/P EST MOD 30 MIN: CPT

## 2021-03-23 PROCEDURE — 93000 ELECTROCARDIOGRAM COMPLETE: CPT

## 2021-03-23 RX ORDER — LORATADINE 10 MG
17 TABLET,DISINTEGRATING ORAL
Qty: 3 | Refills: 2 | Status: DISCONTINUED | COMMUNITY
Start: 2021-03-20 | End: 2021-03-23

## 2021-03-23 RX ORDER — DOXYCYCLINE HYCLATE 100 MG/1
100 CAPSULE ORAL
Refills: 0 | Status: DISCONTINUED | COMMUNITY
End: 2021-03-23

## 2021-03-23 RX ORDER — VIT A/VIT C/VIT E/ZINC/COPPER 4296-226
CAPSULE ORAL DAILY
Refills: 0 | Status: DISCONTINUED | COMMUNITY
End: 2021-03-23

## 2021-03-23 RX ORDER — BACILLUS COAGULANS/INULIN 1B-250 MG
CAPSULE ORAL DAILY
Refills: 0 | Status: DISCONTINUED | COMMUNITY
End: 2021-03-23

## 2021-03-24 ENCOUNTER — APPOINTMENT (OUTPATIENT)
Dept: INTERNAL MEDICINE | Facility: CLINIC | Age: 86
End: 2021-03-24
Payer: MEDICARE

## 2021-03-24 VITALS
OXYGEN SATURATION: 97 % | HEIGHT: 64 IN | SYSTOLIC BLOOD PRESSURE: 144 MMHG | TEMPERATURE: 97.16 F | BODY MASS INDEX: 26.8 KG/M2 | DIASTOLIC BLOOD PRESSURE: 64 MMHG | WEIGHT: 157 LBS | HEART RATE: 57 BPM

## 2021-03-24 VITALS
OXYGEN SATURATION: 97 % | TEMPERATURE: 97.1 F | HEIGHT: 63.5 IN | BODY MASS INDEX: 27.65 KG/M2 | SYSTOLIC BLOOD PRESSURE: 144 MMHG | WEIGHT: 158 LBS | HEART RATE: 58 BPM | DIASTOLIC BLOOD PRESSURE: 62 MMHG

## 2021-03-24 VITALS — DIASTOLIC BLOOD PRESSURE: 84 MMHG | SYSTOLIC BLOOD PRESSURE: 160 MMHG

## 2021-03-24 DIAGNOSIS — Z87.09 PERSONAL HISTORY OF OTHER DISEASES OF THE RESPIRATORY SYSTEM: ICD-10-CM

## 2021-03-24 DIAGNOSIS — Z20.822 CONTACT WITH AND (SUSPECTED) EXPOSURE TO COVID-19: ICD-10-CM

## 2021-03-24 PROCEDURE — 99495 TRANSJ CARE MGMT MOD F2F 14D: CPT | Mod: 25

## 2021-03-24 PROCEDURE — 36415 COLL VENOUS BLD VENIPUNCTURE: CPT

## 2021-03-24 RX ORDER — HYDRALAZINE HYDROCHLORIDE 100 MG/1
100 TABLET ORAL
Qty: 270 | Refills: 3 | Status: DISCONTINUED | COMMUNITY
Start: 2021-03-23 | End: 2021-03-24

## 2021-03-24 RX ORDER — FLUTICASONE PROPIONATE AND SALMETEROL 250; 50 UG/1; UG/1
250-50 POWDER RESPIRATORY (INHALATION)
Qty: 1 | Refills: 0 | Status: DISCONTINUED | COMMUNITY
Start: 2021-02-24 | End: 2021-03-24

## 2021-03-24 RX ORDER — ALBUTEROL SULFATE 90 UG/1
108 (90 BASE) INHALANT RESPIRATORY (INHALATION)
Refills: 0 | Status: DISCONTINUED | COMMUNITY
End: 2021-03-24

## 2021-03-25 ENCOUNTER — RX RENEWAL (OUTPATIENT)
Age: 86
End: 2021-03-25

## 2021-03-26 LAB
ALBUMIN SERPL ELPH-MCNC: 4.1 G/DL
ANION GAP SERPL CALC-SCNC: 13 MMOL/L
BUN SERPL-MCNC: 36 MG/DL
CALCIUM SERPL-MCNC: 9.5 MG/DL
CHLORIDE SERPL-SCNC: 107 MMOL/L
CO2 SERPL-SCNC: 18 MMOL/L
CREAT SERPL-MCNC: 1.94 MG/DL
GLUCOSE SERPL-MCNC: 98 MG/DL
PHOSPHATE SERPL-MCNC: 3.1 MG/DL
POTASSIUM SERPL-SCNC: 4.6 MMOL/L
SODIUM SERPL-SCNC: 138 MMOL/L

## 2021-03-28 PROBLEM — Z20.822 EXPOSURE TO COVID-19 VIRUS: Status: RESOLVED | Noted: 2020-09-08 | Resolved: 2021-03-28

## 2021-03-28 PROBLEM — Z87.09 HISTORY OF BRONCHITIS: Status: RESOLVED | Noted: 2021-02-24 | Resolved: 2021-03-28

## 2021-03-28 RX ORDER — DOXYCYCLINE HYCLATE 100 MG/1
100 TABLET ORAL
Qty: 14 | Refills: 0 | Status: COMPLETED | COMMUNITY
Start: 2021-02-23

## 2021-03-28 NOTE — HISTORY OF PRESENT ILLNESS
[Post-hospitalization from ___ Hospital] : Post-hospitalization from [unfilled] Hospital [Admitted on: ___] : The patient was admitted on [unfilled] [Discharged on ___] : discharged on [unfilled] [Discharge Summary] : discharge summary [Pertinent Labs] : pertinent labs [Radiology Findings] : radiology findings [Discharge Med List] : discharge medication list [Med Reconciliation] : medication reconciliation has been completed [Patient Contacted By: ____] : and contacted by [unfilled] [FreeTextEntry2] : Hospital Course:  Discharge Date	13-Mar-2021  Admission Date	11-Mar-2021 20:26  Reason for Admission	89M p/w CP  Hospital Course	  89M w/ HTN, CKDIV, enlarged prostate presents to [facility] for evaluation of cp. He  denies having cp previously or hx of heart disease. No orthopnea or pnd  reported. Does have baseline b/l lower extremity swelling for which he takes  torsemide. He does not report daily asa use.  He has completed vaccine series  for COVID19 series with 2nd injection completed 1 month prior.    Problem/Plan - 1:  -  Problem: Essential hypertension.  Plan: BP uncontrolled 180s, asymptomatic  -Imdur 60mg daily  -Amlodipine 10mg daily  - c/w Hydralazine 100mg tid  - diovan on hold per cards/renal  - c/w toprol 50mg daily    Problem/Plan - 2:  -  Problem: Dyspnea on exertion.  Plan: resolved, on room air  - Stress test positive but pt/family refusing cath, prefer medical management  - TTE hyperdynamic LV function, no WMA, hold torsemide per cards/renal.  - c/w ASA, statin    Problem/Plan - 3:  -  Problem: YESSICA (acute kidney injury).  Plan: YESSICA on CKD3. Cr fluctuates  1.6-1.9, cr near baseline  - Nephrology recommendations appreciated  -Hold diuretics  -renal US can be done a outpatient as cr near baseline, no s/s of retention   DCP with medication reconciliation discussed with Dr. Aguilar.  Patient cleared for discharge home without skilled needs.  Follow up with PCP and cardiologist in 1 week.   He is here with his wife Angie.  Since he has been home he has been feeling well except for constipation. He has only had one or 2 bowel movements over the past week and a half. His wife gave him an enema this weekend. She has been reluctant to give him MiraLax as she read on the label that it should be used with caution with  kidney disease. He is drinking a lot of fluids and having fiber in his diet. He is walking two thirds of a mile a day without chest pain or dyspnea. His saw cardiology since he's been home and all was fine. They have been monitoring his blood pressure at home and it has been running at times a bit high in the 160s in the morning. Wife questions medication schedule as it is now spreading over  5 times a day. He is aware of some edema in his legs.  He has had no recurrent chest pain or dyspnea.

## 2021-03-28 NOTE — ASSESSMENT
[FreeTextEntry1] : 89-year-old man with hypertension and chronic kidney disease stage IV recently with with exertional chest pain and dyspnea and abnormal stress test. He has been treated medically as he does not want to have invasive workup to risk of cardiac catheterization contrast agent. . He is now on Imdur  and off his torsemide and Benicar without recurrent chest pain or dyspnea. His blood pressure is a bit high and he does have some mild edema. I advised he resume torsemide 10 mg 3 times a week. A renal panel was sent. He was advised to followup with nephrology Dr. Tolbert. Regarding his constipation he may take Senokot 2 tablets every 12 hours daily with his bowels and then may begin Benefiber.Medication timeline reviewed in depth with wife and simplified  to t.i.d..\par He'll be seen here again in a month....reassess lipids, edema and renal function.

## 2021-03-28 NOTE — PHYSICAL EXAM
[No Acute Distress] : no acute distress [Well Nourished] : well nourished [Well Developed] : well developed [Well-Appearing] : well-appearing [Normal Voice/Communication] : normal voice/communication [Normal Sclera/Conjunctiva] : normal sclera/conjunctiva [PERRL] : pupils equal round and reactive to light [EOMI] : extraocular movements intact [Normal Outer Ear/Nose] : the outer ears and nose were normal in appearance [Normal Oropharynx] : the oropharynx was normal [No JVD] : no jugular venous distention [No Lymphadenopathy] : no lymphadenopathy [Supple] : supple [Thyroid Normal, No Nodules] : the thyroid was normal and there were no nodules present [No Respiratory Distress] : no respiratory distress  [No Accessory Muscle Use] : no accessory muscle use [Clear to Auscultation] : lungs were clear to auscultation bilaterally [Normal Percussion] : the chest was normal to percussion [Normal Rate] : normal rate  [Regular Rhythm] : with a regular rhythm [Normal S1, S2] : normal S1 and S2 [No Murmur] : no murmur heard [No Carotid Bruits] : no carotid bruits [No Abdominal Bruit] : a ~M bruit was not heard ~T in the abdomen [No Varicosities] : no varicosities [Pedal Pulses Present] : the pedal pulses are present [No Palpable Aorta] : no palpable aorta [No Extremity Clubbing/Cyanosis] : no extremity clubbing/cyanosis [Soft] : abdomen soft [Non Tender] : non-tender [Non-distended] : non-distended [No Masses] : no abdominal mass palpated [No HSM] : no HSM [Normal Bowel Sounds] : normal bowel sounds [Normal Supraclavicular Nodes] : no supraclavicular lymphadenopathy [Normal Axillary Nodes] : no axillary lymphadenopathy [Normal Posterior Cervical Nodes] : no posterior cervical lymphadenopathy [Normal Anterior Cervical Nodes] : no anterior cervical lymphadenopathy [Normal Inguinal Nodes] : no inguinal lymphadenopathy [No CVA Tenderness] : no CVA  tenderness [No Spinal Tenderness] : no spinal tenderness [No Joint Swelling] : no joint swelling [Grossly Normal Strength/Tone] : grossly normal strength/tone [No Rash] : no rash [Speech Grossly Normal] : speech grossly normal [Memory Grossly Normal] : memory grossly normal [Normal Affect] : the affect was normal [Alert and Oriented x3] : oriented to person, place, and time [Normal Mood] : the mood was normal [Normal Insight/Judgement] : insight and judgment were intact [18673 - Moderate Complexity requires multiple possible diagnoses and/or the management options, moderate complexity of the medical data (tests, etc.) to be reviewed, and moderate risk of significant complications, morbidity, and/or mortality as well as co] : Moderate Complexity [de-identified] : 1+ pretibial edema bilaterally

## 2021-04-02 ENCOUNTER — APPOINTMENT (OUTPATIENT)
Dept: NEPHROLOGY | Facility: CLINIC | Age: 86
End: 2021-04-02
Payer: MEDICARE

## 2021-04-02 VITALS — BODY MASS INDEX: 27.68 KG/M2 | OXYGEN SATURATION: 98 % | WEIGHT: 158.73 LBS | HEART RATE: 55 BPM | TEMPERATURE: 97.8 F

## 2021-04-02 VITALS — SYSTOLIC BLOOD PRESSURE: 179 MMHG | DIASTOLIC BLOOD PRESSURE: 63 MMHG

## 2021-04-02 VITALS — HEART RATE: 58 BPM | SYSTOLIC BLOOD PRESSURE: 150 MMHG | DIASTOLIC BLOOD PRESSURE: 60 MMHG

## 2021-04-02 PROCEDURE — 99214 OFFICE O/P EST MOD 30 MIN: CPT

## 2021-04-02 NOTE — PHYSICAL EXAM
[General Appearance - Alert] : alert [General Appearance - In No Acute Distress] : in no acute distress [Sclera] : the sclera and conjunctiva were normal [Outer Ear] : the ears and nose were normal in appearance [Neck Appearance] : the appearance of the neck was normal [Auscultation Breath Sounds / Voice Sounds] : lungs were clear to auscultation bilaterally [Heart Rate And Rhythm] : heart rate was normal and rhythm regular [Heart Sounds] : normal S1 and S2 [Heart Sounds Pericardial Friction Rub] : no pericardial rub [FreeTextEntry1] : 2+ edema [Bowel Sounds] : normal bowel sounds [Abdomen Soft] : soft [No CVA Tenderness] : no ~M costovertebral angle tenderness [] : no rash [No Focal Deficits] : no focal deficits [Oriented To Time, Place, And Person] : oriented to person, place, and time [Affect] : the affect was normal [Mood] : the mood was normal

## 2021-04-02 NOTE — HISTORY OF PRESENT ILLNESS
[FreeTextEntry1] : 89 yo male here for CKD4, HTN here for follow-up post hospitalization\par He had CP and was in [facility]\par He did not go for invasive cardiac eval and instead opted medical management\par He was placed on imdur and diuretic was held bc of rising Cr in 2's\par He feels ok but noticed his BP has been elevated and going up\par He was advised to go back on diuretic\par

## 2021-04-02 NOTE — ASSESSMENT
[FreeTextEntry1] : CKD 4 Renal function new baseline.\par Hyperkalemia: Stable.  Keep on low K diet. Stop prunes\par HTN: BP mildly elevated.\par Keep current doses of amlodipine, off ARB, continue hydralazine along with all other meds\par Take torsemide 5 days a week for edema and HTN control. \par Continue to monitor BP which already has been improving with diuretic. Can sacrifice some increase in cr as long as K and edema and BP managed. \par Secondary hyperparathyroidism: Continue vitamin D over-the-counter and continue calcitriol 4 times a week.\par Follow-up in 4 months

## 2021-04-02 NOTE — REVIEW OF SYSTEMS
[Lower Ext Edema] : lower extremity edema [Constipation] : constipation [Negative] : Heme/Lymph [FreeTextEntry7] : was taking prunes

## 2021-04-22 ENCOUNTER — APPOINTMENT (OUTPATIENT)
Dept: INTERNAL MEDICINE | Facility: CLINIC | Age: 86
End: 2021-04-22
Payer: MEDICARE

## 2021-04-22 PROCEDURE — 36415 COLL VENOUS BLD VENIPUNCTURE: CPT

## 2021-04-28 ENCOUNTER — APPOINTMENT (OUTPATIENT)
Dept: INTERNAL MEDICINE | Facility: CLINIC | Age: 86
End: 2021-04-28
Payer: MEDICARE

## 2021-04-28 VITALS
SYSTOLIC BLOOD PRESSURE: 150 MMHG | DIASTOLIC BLOOD PRESSURE: 72 MMHG | BODY MASS INDEX: 27.12 KG/M2 | OXYGEN SATURATION: 98 % | WEIGHT: 155 LBS | TEMPERATURE: 96.44 F | HEART RATE: 57 BPM | HEIGHT: 63.5 IN

## 2021-04-28 VITALS — DIASTOLIC BLOOD PRESSURE: 80 MMHG | SYSTOLIC BLOOD PRESSURE: 180 MMHG | HEART RATE: 56 BPM

## 2021-04-28 VITALS — SYSTOLIC BLOOD PRESSURE: 170 MMHG | DIASTOLIC BLOOD PRESSURE: 70 MMHG | HEART RATE: 56 BPM

## 2021-04-28 DIAGNOSIS — Z92.89 PERSONAL HISTORY OF OTHER MEDICAL TREATMENT: ICD-10-CM

## 2021-04-28 DIAGNOSIS — Z13.31 ENCOUNTER FOR SCREENING FOR DEPRESSION: ICD-10-CM

## 2021-04-28 DIAGNOSIS — M10.9 GOUT, UNSPECIFIED: ICD-10-CM

## 2021-04-28 DIAGNOSIS — R60.0 LOCALIZED EDEMA: ICD-10-CM

## 2021-04-28 DIAGNOSIS — Z23 ENCOUNTER FOR IMMUNIZATION: ICD-10-CM

## 2021-04-28 LAB
25(OH)D3 SERPL-MCNC: 32.1 NG/ML
ALBUMIN SERPL ELPH-MCNC: 4.5 G/DL
ALP BLD-CCNC: 59 U/L
ALT SERPL-CCNC: 18 U/L
ANION GAP SERPL CALC-SCNC: 12 MMOL/L
APPEARANCE: CLEAR
AST SERPL-CCNC: 25 U/L
BACTERIA: NEGATIVE
BASOPHILS # BLD AUTO: 0.06 K/UL
BASOPHILS NFR BLD AUTO: 1.1 %
BILIRUB SERPL-MCNC: 0.5 MG/DL
BILIRUBIN URINE: NEGATIVE
BLOOD URINE: NEGATIVE
BUN SERPL-MCNC: 35 MG/DL
CALCIUM SERPL-MCNC: 10 MG/DL
CALCIUM SERPL-MCNC: 10 MG/DL
CHLORIDE SERPL-SCNC: 107 MMOL/L
CHOLEST SERPL-MCNC: 121 MG/DL
CO2 SERPL-SCNC: 22 MMOL/L
COLOR: NORMAL
CREAT SERPL-MCNC: 2.09 MG/DL
CREAT SPEC-SCNC: 32 MG/DL
CREAT/PROT UR: 2.2 RATIO
EOSINOPHIL # BLD AUTO: 0.28 K/UL
EOSINOPHIL NFR BLD AUTO: 5 %
ESTIMATED AVERAGE GLUCOSE: 85 MG/DL
GLUCOSE QUALITATIVE U: NEGATIVE
GLUCOSE SERPL-MCNC: 93 MG/DL
HBA1C MFR BLD HPLC: 4.6 %
HCT VFR BLD CALC: 42.9 %
HDLC SERPL-MCNC: 49 MG/DL
HGB BLD-MCNC: 14.5 G/DL
HYALINE CASTS: 1 /LPF
IMM GRANULOCYTES NFR BLD AUTO: 0.2 %
KETONES URINE: NEGATIVE
LDLC SERPL CALC-MCNC: 48 MG/DL
LEUKOCYTE ESTERASE URINE: NEGATIVE
LYMPHOCYTES # BLD AUTO: 0.79 K/UL
LYMPHOCYTES NFR BLD AUTO: 14.1 %
MAN DIFF?: NORMAL
MCHC RBC-ENTMCNC: 31.9 PG
MCHC RBC-ENTMCNC: 33.8 GM/DL
MCV RBC AUTO: 94.3 FL
MICROSCOPIC-UA: NORMAL
MONOCYTES # BLD AUTO: 0.63 K/UL
MONOCYTES NFR BLD AUTO: 11.2 %
NEUTROPHILS # BLD AUTO: 3.84 K/UL
NEUTROPHILS NFR BLD AUTO: 68.4 %
NITRITE URINE: NEGATIVE
NONHDLC SERPL-MCNC: 72 MG/DL
PARATHYROID HORMONE INTACT: 239 PG/ML
PH URINE: 6
PHOSPHATE SERPL-MCNC: 3.1 MG/DL
PLATELET # BLD AUTO: 178 K/UL
POTASSIUM SERPL-SCNC: 4.2 MMOL/L
PROT SERPL-MCNC: 6.9 G/DL
PROT UR-MCNC: 70 MG/DL
PROTEIN URINE: ABNORMAL
PSA SERPL-MCNC: 0.97 NG/ML
RBC # BLD: 4.55 M/UL
RBC # FLD: 13.8 %
RED BLOOD CELLS URINE: 0 /HPF
SODIUM SERPL-SCNC: 141 MMOL/L
SPECIFIC GRAVITY URINE: 1.01
SQUAMOUS EPITHELIAL CELLS: 0 /HPF
T4 FREE SERPL-MCNC: 1.5 NG/DL
TRIGL SERPL-MCNC: 117 MG/DL
TSH SERPL-ACNC: 2.91 UIU/ML
URATE SERPL-MCNC: 6.6 MG/DL
UROBILINOGEN URINE: NORMAL
WBC # FLD AUTO: 5.61 K/UL
WHITE BLOOD CELLS URINE: 0 /HPF

## 2021-04-28 PROCEDURE — G0444 DEPRESSION SCREEN ANNUAL: CPT | Mod: 59

## 2021-04-28 PROCEDURE — 90732 PPSV23 VACC 2 YRS+ SUBQ/IM: CPT

## 2021-04-28 PROCEDURE — G0009: CPT

## 2021-04-28 PROCEDURE — 99214 OFFICE O/P EST MOD 30 MIN: CPT | Mod: 25

## 2021-04-28 PROCEDURE — G0439: CPT

## 2021-04-28 RX ORDER — MINERAL OIL 118 G/118ML
ENEMA RECTAL DAILY
Qty: 1 | Refills: 0 | Status: DISCONTINUED | COMMUNITY
Start: 2021-03-20 | End: 2021-04-28

## 2021-04-28 RX ORDER — GUAIFENESIN 600 MG/1
TABLET, EXTENDED RELEASE ORAL
Refills: 0 | Status: DISCONTINUED | COMMUNITY
End: 2021-04-28

## 2021-04-28 RX ORDER — SENNOSIDES 8.6 MG TABLETS 8.6 MG/1
8.6 TABLET ORAL TWICE DAILY
Qty: 180 | Refills: 1 | Status: DISCONTINUED | COMMUNITY
Start: 2021-03-24 | End: 2021-04-28

## 2021-04-28 NOTE — PHYSICAL EXAM
[Well Nourished] : well nourished [Well Developed] : well developed [Well-Appearing] : well-appearing [Normal Voice/Communication] : normal voice/communication [Normal Sclera/Conjunctiva] : normal sclera/conjunctiva [PERRL] : pupils equal round and reactive to light [EOMI] : extraocular movements intact [Normal Outer Ear/Nose] : the outer ears and nose were normal in appearance [Normal Oropharynx] : the oropharynx was normal [Normal TMs] : both tympanic membranes were normal [No JVD] : no jugular venous distention [No Lymphadenopathy] : no lymphadenopathy [Supple] : supple [Thyroid Normal, No Nodules] : the thyroid was normal and there were no nodules present [No Respiratory Distress] : no respiratory distress  [No Accessory Muscle Use] : no accessory muscle use [Clear to Auscultation] : lungs were clear to auscultation bilaterally [Normal Percussion] : the chest was normal to percussion [Normal Rate] : normal rate  [Regular Rhythm] : with a regular rhythm [Normal S1, S2] : normal S1 and S2 [No Murmur] : no murmur heard [Normal] : normal rate, regular rhythm, normal S1 and S2 and no murmur heard [No Carotid Bruits] : no carotid bruits [No Abdominal Bruit] : a ~M bruit was not heard ~T in the abdomen [Pedal Pulses Present] : the pedal pulses are present [No Edema] : there was no peripheral edema [No Palpable Aorta] : no palpable aorta [No Extremity Clubbing/Cyanosis] : no extremity clubbing/cyanosis [Normal Appearance] : normal in appearance [No Nipple Discharge] : no nipple discharge [No Axillary Lymphadenopathy] : no axillary lymphadenopathy [Soft] : abdomen soft [Non Tender] : non-tender [Non-distended] : non-distended [No Masses] : no abdominal mass palpated [No HSM] : no HSM [Normal Bowel Sounds] : normal bowel sounds [Normal Supraclavicular Nodes] : no supraclavicular lymphadenopathy [Normal Axillary Nodes] : no axillary lymphadenopathy [Normal Posterior Cervical Nodes] : no posterior cervical lymphadenopathy [Normal Anterior Cervical Nodes] : no anterior cervical lymphadenopathy [Normal Inguinal Nodes] : no inguinal lymphadenopathy [No CVA Tenderness] : no CVA  tenderness [No Spinal Tenderness] : no spinal tenderness [Coordination Grossly Intact] : coordination grossly intact [No Focal Deficits] : no focal deficits [Normal Gait] : normal gait [Deep Tendon Reflexes (DTR)] : deep tendon reflexes were 2+ and symmetric [Speech Grossly Normal] : speech grossly normal [Memory Grossly Normal] : memory grossly normal [Normal Affect] : the affect was normal [Alert and Oriented x3] : oriented to person, place, and time [Normal Mood] : the mood was normal [Normal Insight/Judgement] : insight and judgment were intact [No Acute Distress] : no acute distress [Kyphosis] : no kyphosis [Scoliosis] : no scoliosis [de-identified] : Large varicosity left thigh [de-identified] : Hyperpigmentation anterior shins consistent with solar keratoses in perhaps venous stasis changes.Multiple seborrheic keratoses on back. 6cm  lipoma over the scapula

## 2021-04-28 NOTE — ASSESSMENT
[FreeTextEntry1] : His blood pressure is elevated and he is not orthostatic. He is already on maximum doses of blocker and hydralazine. I have discussed with renal Dr. Tolbert and we will add doxazosin 2 mg at bedtime.\par He will monitor his blood pressure at home . We discussed the possible first dose response of orthostasis. He will continue with salt restriction. He'll be reevaluated here in a month.\par His recent blood work was reviewed with him and his chronic kidney disease is stable at a new creatinine level of about 2.0. His lipids are in control. His thyroid function is normal\par He will continue to followup with endocrinology for his thyroid nodules and  osteoporosis\par He was given a Pneumovax booster today\par Advanced directives were reviewed and the patient has them in place

## 2021-04-28 NOTE — HISTORY OF PRESENT ILLNESS
[Spouse] : spouse [FreeTextEntry1] : Annual wellness visit\par Hypertension\par Chronic kidney disease\par Hyperlipidemia\par CAD\par Osteoporosis\par Thyroid nodule [de-identified] : He is overall feeling well. He complains of some occasional cramps in his hands and feet. He has not fallen. He exercises with walking. He has had no recurrent chest pain or dyspnea. He states his bowels are fine . He has had no recurrent rectal bleeding since removal of tubular adenoma about a year ago. He has nocturia 2-3 times a night. He states he eats a healthy diet provided by his wife. He is careful with salt in his diet he has no edema. He sees endocrinology and he states he had a thyroid sonogram with stable nodules.  He is receiving Prolia  every 6 months. He wears his hearing aids. He states his vision is fine with macular degeneration unable to read or drive but he can watch TV. He sees dermatology and all has  been fine.   He has had no episodes of gout.

## 2021-04-28 NOTE — REVIEW OF SYSTEMS
[Vision Problems] : vision problems [Hearing Loss] : hearing loss [Nocturia] : nocturia [Negative] : Heme/Lymph [FreeTextEntry3] : Macula degeneration [FreeTextEntry4] : hearing aide [FreeTextEntry8] : Nocturia 2-3 times a night. His stream is fine

## 2021-04-28 NOTE — HEALTH RISK ASSESSMENT
[1 or 2 (0 pts)] : 1 or 2 (0 points) [Never (0 pts)] : Never (0 points) [No] : In the past 12 months have you used drugs other than those required for medical reasons? No [No falls in past year] : Patient reported no falls in the past year [0] : 2) Feeling down, depressed, or hopeless: Not at all (0) [None] : None [With Family] : lives with family [Retired] : retired [] :  [# Of Children ___] : has [unfilled] children [Sexually Active] : sexually active [Feels Safe at Home] : Feels safe at home [Fully functional (bathing, dressing, toileting, transferring, walking, feeding)] : Fully functional (bathing, dressing, toileting, transferring, walking, feeding) [Fully functional (using the telephone, shopping, preparing meals, housekeeping, doing laundry, using] : Fully functional and needs no help or supervision to perform IADLs (using the telephone, shopping, preparing meals, housekeeping, doing laundry, using transportation, managing medications and managing finances) [Independent] : managing finances [Reports changes in hearing] : Reports changes in hearing [Reports changes in vision] : Reports changes in vision [Smoke Detector] : smoke detector [Carbon Monoxide Detector] : carbon monoxide detector [Safety elements used in home] : safety elements used in home [Seat Belt] :  uses seat belt [Sunscreen] : uses sunscreen [With Patient/Caregiver] : With Patient/Caregiver [Designated Healthcare Proxy] : Designated healthcare proxy [Name: ___] : Health Care Proxy's Name: [unfilled]  [Relationship: ___] : Relationship: [unfilled] [I will adhere to the patient's wishes as expressed in the advance directive except as noted below.] : I will adhere to the patient's wishes as expressed in the advance directive except as noted below [2 - 3 times a week (3 pts)] : 2 - 3  times a week (3 points) [Yes] : Yes [] : No [de-identified] : walks 45 minutes a day [de-identified] : watches salt in diet [[facility]] : 8.4 [[id number]] : 0 [Change in mental status noted] : No change in mental status noted [Language] : denies difficulty with language [Behavior] : denies difficulty with behavior [Learning/Retaining New Information] : denies difficulty learning/retaining new information [Handling Complex Tasks] : denies difficulty handling complex tasks [Reasoning] : denies difficulty with reasoning [Spatial Ability and Orientation] : denies difficulty with spatial ability and orientation [High Risk Behavior] : no high risk behavior [Reports normal functional visual acuity (ie: able to read med bottle)] : Reports poor functional visual acuity.  [Reports changes in dental health] : Reports no changes in dental health [Guns at Home] : no guns at home [BoneDensityDate] : 12/2020 [BoneDensityComments] : osteoporosis [de-identified] : Independent except not driving [de-identified] : Hearing aid [de-identified] : Macular degeneration. Can't read [de-identified] : Grab bars [AdvancecareDate] : 04/2021 [FreeTextEntry4] : \par Patient states he has discussed his wishes with his healthcare agent and requests no aggressive treatment Should he have a terminal illness or an  illness from which he will not make a recovery to a good  independent good quality of life.

## 2021-05-07 ENCOUNTER — RX RENEWAL (OUTPATIENT)
Age: 86
End: 2021-05-07

## 2021-05-24 ENCOUNTER — NON-APPOINTMENT (OUTPATIENT)
Age: 86
End: 2021-05-24

## 2021-05-24 ENCOUNTER — APPOINTMENT (OUTPATIENT)
Dept: CARDIOLOGY | Facility: CLINIC | Age: 86
End: 2021-05-24
Payer: MEDICARE

## 2021-05-24 VITALS
HEIGHT: 63.5 IN | HEART RATE: 55 BPM | WEIGHT: 159 LBS | OXYGEN SATURATION: 98 % | SYSTOLIC BLOOD PRESSURE: 167 MMHG | DIASTOLIC BLOOD PRESSURE: 72 MMHG | BODY MASS INDEX: 27.82 KG/M2

## 2021-05-24 PROCEDURE — 99214 OFFICE O/P EST MOD 30 MIN: CPT

## 2021-05-24 PROCEDURE — 93000 ELECTROCARDIOGRAM COMPLETE: CPT

## 2021-06-09 ENCOUNTER — APPOINTMENT (OUTPATIENT)
Dept: INTERNAL MEDICINE | Facility: CLINIC | Age: 86
End: 2021-06-09
Payer: MEDICARE

## 2021-06-09 PROCEDURE — 36415 COLL VENOUS BLD VENIPUNCTURE: CPT

## 2021-06-10 ENCOUNTER — APPOINTMENT (OUTPATIENT)
Dept: INTERNAL MEDICINE | Facility: CLINIC | Age: 86
End: 2021-06-10
Payer: MEDICARE

## 2021-06-10 VITALS — SYSTOLIC BLOOD PRESSURE: 160 MMHG | DIASTOLIC BLOOD PRESSURE: 80 MMHG | HEART RATE: 54 BPM

## 2021-06-10 VITALS
TEMPERATURE: 97.16 F | DIASTOLIC BLOOD PRESSURE: 62 MMHG | HEART RATE: 54 BPM | BODY MASS INDEX: 26.95 KG/M2 | HEIGHT: 63.5 IN | WEIGHT: 154 LBS | OXYGEN SATURATION: 96 % | SYSTOLIC BLOOD PRESSURE: 104 MMHG

## 2021-06-10 VITALS — DIASTOLIC BLOOD PRESSURE: 70 MMHG | HEART RATE: 54 BPM | SYSTOLIC BLOOD PRESSURE: 160 MMHG

## 2021-06-10 LAB
ALBUMIN SERPL ELPH-MCNC: 4.3 G/DL
ANION GAP SERPL CALC-SCNC: 10 MMOL/L
BUN SERPL-MCNC: 43 MG/DL
CALCIUM SERPL-MCNC: 9.9 MG/DL
CHLORIDE SERPL-SCNC: 104 MMOL/L
CO2 SERPL-SCNC: 24 MMOL/L
CREAT SERPL-MCNC: 2.32 MG/DL
GLUCOSE SERPL-MCNC: 110 MG/DL
PHOSPHATE SERPL-MCNC: 3.1 MG/DL
POTASSIUM SERPL-SCNC: 4.1 MMOL/L
SODIUM SERPL-SCNC: 138 MMOL/L

## 2021-06-10 PROCEDURE — 99214 OFFICE O/P EST MOD 30 MIN: CPT

## 2021-06-11 ENCOUNTER — APPOINTMENT (OUTPATIENT)
Dept: ULTRASOUND IMAGING | Facility: CLINIC | Age: 86
End: 2021-06-11
Payer: MEDICARE

## 2021-06-11 ENCOUNTER — OUTPATIENT (OUTPATIENT)
Dept: OUTPATIENT SERVICES | Facility: HOSPITAL | Age: 86
LOS: 1 days | End: 2021-06-11
Payer: MEDICARE

## 2021-06-11 DIAGNOSIS — N17.9 ACUTE KIDNEY FAILURE, UNSPECIFIED: ICD-10-CM

## 2021-06-11 PROCEDURE — 76770 US EXAM ABDO BACK WALL COMP: CPT

## 2021-06-11 PROCEDURE — 76770 US EXAM ABDO BACK WALL COMP: CPT | Mod: 26

## 2021-06-12 RX ORDER — WHEAT DEXTRIN 3 G/4 G
POWDER (GRAM) ORAL
Qty: 1 | Refills: 0 | Status: ACTIVE | COMMUNITY
Start: 2021-06-12

## 2021-06-12 NOTE — PHYSICAL EXAM
[No Acute Distress] : no acute distress [Well Nourished] : well nourished [Well Developed] : well developed [Well-Appearing] : well-appearing [Normal Sclera/Conjunctiva] : normal sclera/conjunctiva [PERRL] : pupils equal round and reactive to light [EOMI] : extraocular movements intact [Normal Outer Ear/Nose] : the outer ears and nose were normal in appearance [Normal Oropharynx] : the oropharynx was normal [No JVD] : no jugular venous distention [No Lymphadenopathy] : no lymphadenopathy [Supple] : supple [Thyroid Normal, No Nodules] : the thyroid was normal and there were no nodules present [No Respiratory Distress] : no respiratory distress  [No Accessory Muscle Use] : no accessory muscle use [Clear to Auscultation] : lungs were clear to auscultation bilaterally [Normal Rate] : normal rate  [Regular Rhythm] : with a regular rhythm [Normal S1, S2] : normal S1 and S2 [No Murmur] : no murmur heard [No Carotid Bruits] : no carotid bruits [Soft] : abdomen soft [Non Tender] : non-tender [Non-distended] : non-distended [No Masses] : no abdominal mass palpated [No HSM] : no HSM [Normal Bowel Sounds] : normal bowel sounds [Normal Posterior Cervical Nodes] : no posterior cervical lymphadenopathy [Normal Anterior Cervical Nodes] : no anterior cervical lymphadenopathy [No CVA Tenderness] : no CVA  tenderness [No Spinal Tenderness] : no spinal tenderness [No Joint Swelling] : no joint swelling [Grossly Normal Strength/Tone] : grossly normal strength/tone [No Rash] : no rash [Normal Gait] : normal gait [Speech Grossly Normal] : speech grossly normal [Memory Grossly Normal] : memory grossly normal [Normal Affect] : the affect was normal [Normal Mood] : the mood was normal [Normal Insight/Judgement] : insight and judgment were intact [Normal Voice/Communication] : normal voice/communication [Normal Percussion] : the chest was normal to percussion [Normal Supraclavicular Nodes] : no supraclavicular lymphadenopathy [Normal Axillary Nodes] : no axillary lymphadenopathy [Normal Inguinal Nodes] : no inguinal lymphadenopathy [de-identified] : 1-2+ pretibial edema Bilaterally

## 2021-06-12 NOTE — ASSESSMENT
[FreeTextEntry1] : His renal panel done 6/9/2921 was reviewed with patient and wife. His  chronic kidney disease is worsening with rising creatinine. I have given him a referral to have a renal and post void residual bladder sonogram. He will followup with nephrology. He will stay hydrated. His blood pressure is labile here but he states his levels are in the 130 systolic at home so for now we will continue on his current medication.  He was advised to stop artichoke powder and take benefiber as I am not sure the purity of the artichoke powder

## 2021-06-12 NOTE — HISTORY OF PRESENT ILLNESS
[Spouse] : spouse [FreeTextEntry1] : Hypertension\par Chronic kidney disease [de-identified] : He overall is feeling well. He has had no side effects from doxazosin  His wife's shows me Colace and some herbal artichoke fiber extract that he is taking for his constipation. He feels he empties his bladder well. He has some right posterior hip pain when he wears his belt tight. He has no flank pain. His dry mouth has resolved.He is careful with salt in his diet

## 2021-06-14 ENCOUNTER — APPOINTMENT (OUTPATIENT)
Dept: NEPHROLOGY | Facility: CLINIC | Age: 86
End: 2021-06-14
Payer: MEDICARE

## 2021-06-14 VITALS
BODY MASS INDEX: 30.08 KG/M2 | HEIGHT: 60 IN | WEIGHT: 153.22 LBS | OXYGEN SATURATION: 98 % | DIASTOLIC BLOOD PRESSURE: 56 MMHG | HEART RATE: 51 BPM | TEMPERATURE: 207.86 F | SYSTOLIC BLOOD PRESSURE: 137 MMHG

## 2021-06-14 PROCEDURE — 99214 OFFICE O/P EST MOD 30 MIN: CPT

## 2021-06-14 NOTE — HISTORY OF PRESENT ILLNESS
[FreeTextEntry1] : 89 yo male here for CKD4, HTN here for follow-up rising cr\par Labs with Dr. Morales showed creatinine of 2.3.  Patient went for renal sonogram.  Denies any new complaints.\par Blood pressures are 130s to 140 systolic with occasional spikes of 170 with exertion or stress

## 2021-06-14 NOTE — ASSESSMENT
[FreeTextEntry1] : CKD 4 Renal function possibly new baseline.  We will repeat his renal function today.\par Hyperkalemia: Improved\par HTN: BP controlled\par Keep current doses of amlodipine, off ARB, continue hydralazine along with all other meds\par Take torsemide 10mg 5 days a week for edema and HTN control. \par Continue to monitor BP which already has been improving with diuretic. Can sacrifice some increase in cr as long as K and edema and BP managed. \par Secondary hyperparathyroidism: Continue vitamin D over-the-counter and continue calcitriol 3 times a week.\par Discussed with patient and wife regarding his progression of his CKD.  Obviously we will try to maintain his renal function without any worsening however did discuss with patient and wife possible need in the future for renal replacement therapy.\par We will have him enroll in healthy transitions program to discuss CKD education and future modalities if necessary.\par Follow-up in 4 months

## 2021-06-14 NOTE — PHYSICAL EXAM
[General Appearance - Alert] : alert [General Appearance - In No Acute Distress] : in no acute distress [Sclera] : the sclera and conjunctiva were normal [Outer Ear] : the ears and nose were normal in appearance [Neck Appearance] : the appearance of the neck was normal [Auscultation Breath Sounds / Voice Sounds] : lungs were clear to auscultation bilaterally [Heart Rate And Rhythm] : heart rate was normal and rhythm regular [Heart Sounds] : normal S1 and S2 [Heart Sounds Pericardial Friction Rub] : no pericardial rub [FreeTextEntry1] : 1+ edema [Bowel Sounds] : normal bowel sounds [Abdomen Soft] : soft [No CVA Tenderness] : no ~M costovertebral angle tenderness [] : no rash [No Focal Deficits] : no focal deficits [Affect] : the affect was normal [Oriented To Time, Place, And Person] : oriented to person, place, and time [Mood] : the mood was normal

## 2021-06-18 LAB
ALBUMIN SERPL ELPH-MCNC: 4.3 G/DL
ANION GAP SERPL CALC-SCNC: 11 MMOL/L
BUN SERPL-MCNC: 40 MG/DL
CALCIUM SERPL-MCNC: 10.1 MG/DL
CHLORIDE SERPL-SCNC: 104 MMOL/L
CO2 SERPL-SCNC: 23 MMOL/L
CREAT SERPL-MCNC: 2.55 MG/DL
GLUCOSE SERPL-MCNC: 97 MG/DL
PHOSPHATE SERPL-MCNC: 3.4 MG/DL
POTASSIUM SERPL-SCNC: 4.2 MMOL/L
SODIUM SERPL-SCNC: 138 MMOL/L
URATE SERPL-MCNC: 6.6 MG/DL

## 2021-07-20 ENCOUNTER — NON-APPOINTMENT (OUTPATIENT)
Age: 86
End: 2021-07-20

## 2021-07-20 LAB
ALBUMIN SERPL ELPH-MCNC: 4.1 G/DL
ANION GAP SERPL CALC-SCNC: 14 MMOL/L
BASOPHILS # BLD AUTO: 0.03 K/UL
BASOPHILS NFR BLD AUTO: 0.6 %
BUN SERPL-MCNC: 26 MG/DL
CALCIUM SERPL-MCNC: 9 MG/DL
CHLORIDE SERPL-SCNC: 98 MMOL/L
CO2 SERPL-SCNC: 16 MMOL/L
CREAT SERPL-MCNC: 1.89 MG/DL
EOSINOPHIL # BLD AUTO: 0.11 K/UL
EOSINOPHIL NFR BLD AUTO: 2.2 %
GLUCOSE SERPL-MCNC: 93 MG/DL
HBV CORE IGG+IGM SER QL: NONREACTIVE
HBV SURFACE AB SER QL: NONREACTIVE
HBV SURFACE AG SER QL: NONREACTIVE
HCT VFR BLD CALC: 34.8 %
HCV AB SER QL: NONREACTIVE
HCV S/CO RATIO: 0.08 S/CO
HGB BLD-MCNC: 11.7 G/DL
IMM GRANULOCYTES NFR BLD AUTO: 0.8 %
LYMPHOCYTES # BLD AUTO: 0.56 K/UL
LYMPHOCYTES NFR BLD AUTO: 11.2 %
MAN DIFF?: NORMAL
MCHC RBC-ENTMCNC: 30.9 PG
MCHC RBC-ENTMCNC: 33.6 GM/DL
MCV RBC AUTO: 91.8 FL
MONOCYTES # BLD AUTO: 0.59 K/UL
MONOCYTES NFR BLD AUTO: 11.8 %
NEUTROPHILS # BLD AUTO: 3.65 K/UL
NEUTROPHILS NFR BLD AUTO: 73.4 %
PHOSPHATE SERPL-MCNC: 2.6 MG/DL
PLATELET # BLD AUTO: 146 K/UL
POTASSIUM SERPL-SCNC: 4.6 MMOL/L
RBC # BLD: 3.79 M/UL
RBC # FLD: 12.9 %
SODIUM SERPL-SCNC: 129 MMOL/L
WBC # FLD AUTO: 4.98 K/UL

## 2021-07-26 ENCOUNTER — NON-APPOINTMENT (OUTPATIENT)
Age: 86
End: 2021-07-26

## 2021-08-16 ENCOUNTER — TRANSCRIPTION ENCOUNTER (OUTPATIENT)
Age: 86
End: 2021-08-16

## 2021-08-16 ENCOUNTER — NON-APPOINTMENT (OUTPATIENT)
Age: 86
End: 2021-08-16

## 2021-08-16 ENCOUNTER — APPOINTMENT (OUTPATIENT)
Dept: INTERNAL MEDICINE | Facility: CLINIC | Age: 86
End: 2021-08-16
Payer: MEDICARE

## 2021-08-16 PROCEDURE — 99441: CPT | Mod: CS,95

## 2021-08-31 ENCOUNTER — APPOINTMENT (OUTPATIENT)
Dept: INTERNAL MEDICINE | Facility: CLINIC | Age: 86
End: 2021-08-31
Payer: MEDICARE

## 2021-08-31 PROCEDURE — 36415 COLL VENOUS BLD VENIPUNCTURE: CPT

## 2021-09-01 LAB
COVID-19 SPIKE DOMAIN ANTIBODY INTERPRETATION: POSITIVE
SARS-COV-2 AB SERPL IA-ACNC: 157 U/ML

## 2021-10-07 LAB
25(OH)D3 SERPL-MCNC: 31.9 NG/ML
ALBUMIN SERPL ELPH-MCNC: 4 G/DL
ANION GAP SERPL CALC-SCNC: 12 MMOL/L
BASOPHILS # BLD AUTO: 0.05 K/UL
BASOPHILS NFR BLD AUTO: 0.9 %
BUN SERPL-MCNC: 42 MG/DL
CALCIUM SERPL-MCNC: 8.8 MG/DL
CHLORIDE SERPL-SCNC: 105 MMOL/L
CO2 SERPL-SCNC: 20 MMOL/L
CREAT SERPL-MCNC: 2.24 MG/DL
CREAT SPEC-SCNC: 92 MG/DL
EOSINOPHIL # BLD AUTO: 0.34 K/UL
EOSINOPHIL NFR BLD AUTO: 6 %
FERRITIN SERPL-MCNC: 136 NG/ML
FOLATE SERPL-MCNC: 5.8 NG/ML
GLUCOSE SERPL-MCNC: 96 MG/DL
HCT VFR BLD CALC: 33.1 %
HGB BLD-MCNC: 11.1 G/DL
IMM GRANULOCYTES NFR BLD AUTO: 0.2 %
IRON SATN MFR SERPL: 21 %
IRON SERPL-MCNC: 46 UG/DL
LYMPHOCYTES # BLD AUTO: 0.8 K/UL
LYMPHOCYTES NFR BLD AUTO: 14.1 %
MAN DIFF?: NORMAL
MCHC RBC-ENTMCNC: 31.1 PG
MCHC RBC-ENTMCNC: 33.5 GM/DL
MCV RBC AUTO: 92.7 FL
MICROALBUMIN 24H UR DL<=1MG/L-MCNC: 38.1 MG/DL
MICROALBUMIN/CREAT 24H UR-RTO: 412 MG/G
MONOCYTES # BLD AUTO: 0.66 K/UL
MONOCYTES NFR BLD AUTO: 11.6 %
NEUTROPHILS # BLD AUTO: 3.83 K/UL
NEUTROPHILS NFR BLD AUTO: 67.2 %
PHOSPHATE SERPL-MCNC: 3.1 MG/DL
PLATELET # BLD AUTO: 140 K/UL
POTASSIUM SERPL-SCNC: 4.2 MMOL/L
RBC # BLD: 3.57 M/UL
RBC # FLD: 13.4 %
SODIUM SERPL-SCNC: 137 MMOL/L
TIBC SERPL-MCNC: 221 UG/DL
UIBC SERPL-MCNC: 175 UG/DL
URATE SERPL-MCNC: 6.3 MG/DL
VIT B12 SERPL-MCNC: >2000 PG/ML
WBC # FLD AUTO: 5.69 K/UL

## 2021-10-08 ENCOUNTER — APPOINTMENT (OUTPATIENT)
Dept: INTERNAL MEDICINE | Facility: CLINIC | Age: 86
End: 2021-10-08
Payer: MEDICARE

## 2021-10-08 PROCEDURE — G0008: CPT

## 2021-10-08 PROCEDURE — 90662 IIV NO PRSV INCREASED AG IM: CPT

## 2021-10-11 ENCOUNTER — APPOINTMENT (OUTPATIENT)
Dept: NEPHROLOGY | Facility: CLINIC | Age: 86
End: 2021-10-11
Payer: MEDICARE

## 2021-10-11 VITALS
HEART RATE: 58 BPM | HEIGHT: 60 IN | SYSTOLIC BLOOD PRESSURE: 130 MMHG | WEIGHT: 147 LBS | DIASTOLIC BLOOD PRESSURE: 56 MMHG | BODY MASS INDEX: 28.86 KG/M2

## 2021-10-11 PROCEDURE — 99214 OFFICE O/P EST MOD 30 MIN: CPT

## 2021-10-11 NOTE — HISTORY OF PRESENT ILLNESS
[FreeTextEntry1] : 89 yo male here for CKD4, HTN here for follow-up\par Doing well and feels well\par

## 2021-10-11 NOTE — ASSESSMENT
[FreeTextEntry1] : CKD 4 Renal function stable baseline.\par Hyperkalemia: Improved\par HTN: BP controlled. Doing very well\par Keep current doses of amlodipine, off ARB, continue hydralazine along with all other meds\par Continue torsemide 10mg 5 days a week for edema and HTN control. \par Continue to monitor BP which already has been improving with diuretic. \par Secondary hyperparathyroidism: Continue vitamin D over-the-counter and continue calcitriol 3 times a week.\par Maintain his renal function\par Folic acid for anemia\par Avoid constipation and take senna tea sooner \par Healthy transitions program\par Follow-up in 4 months

## 2021-10-11 NOTE — PHYSICAL EXAM
[General Appearance - Alert] : alert [Sclera] : the sclera and conjunctiva were normal [General Appearance - In No Acute Distress] : in no acute distress [Outer Ear] : the ears and nose were normal in appearance [Neck Appearance] : the appearance of the neck was normal [Auscultation Breath Sounds / Voice Sounds] : lungs were clear to auscultation bilaterally [Heart Rate And Rhythm] : heart rate was normal and rhythm regular [Heart Sounds] : normal S1 and S2 [Heart Sounds Pericardial Friction Rub] : no pericardial rub [FreeTextEntry1] : trace edema [Bowel Sounds] : normal bowel sounds [Abdomen Soft] : soft [No CVA Tenderness] : no ~M costovertebral angle tenderness [] : no rash [No Focal Deficits] : no focal deficits [Oriented To Time, Place, And Person] : oriented to person, place, and time [Mood] : the mood was normal [Affect] : the affect was normal

## 2021-11-02 ENCOUNTER — APPOINTMENT (OUTPATIENT)
Dept: CARDIOLOGY | Facility: CLINIC | Age: 86
End: 2021-11-02
Payer: MEDICARE

## 2021-11-02 ENCOUNTER — NON-APPOINTMENT (OUTPATIENT)
Age: 86
End: 2021-11-02

## 2021-11-02 VITALS — HEART RATE: 49 BPM | OXYGEN SATURATION: 99 % | DIASTOLIC BLOOD PRESSURE: 74 MMHG | SYSTOLIC BLOOD PRESSURE: 173 MMHG

## 2021-11-02 PROCEDURE — 93000 ELECTROCARDIOGRAM COMPLETE: CPT

## 2021-11-02 PROCEDURE — 99214 OFFICE O/P EST MOD 30 MIN: CPT

## 2021-12-20 ENCOUNTER — RX RENEWAL (OUTPATIENT)
Age: 86
End: 2021-12-20

## 2022-01-03 ENCOUNTER — APPOINTMENT (OUTPATIENT)
Dept: GASTROENTEROLOGY | Facility: CLINIC | Age: 87
End: 2022-01-03

## 2022-03-03 LAB
25(OH)D3 SERPL-MCNC: 29.3 NG/ML
ALBUMIN SERPL ELPH-MCNC: 4.6 G/DL
ALP BLD-CCNC: 56 U/L
ALT SERPL-CCNC: 15 U/L
ANION GAP SERPL CALC-SCNC: 14 MMOL/L
APPEARANCE: CLEAR
AST SERPL-CCNC: 22 U/L
BACTERIA: NEGATIVE
BASOPHILS # BLD AUTO: 0.06 K/UL
BASOPHILS NFR BLD AUTO: 1.2 %
BILIRUB SERPL-MCNC: 0.5 MG/DL
BILIRUBIN URINE: NEGATIVE
BLOOD URINE: NEGATIVE
BUN SERPL-MCNC: 36 MG/DL
CALCIUM SERPL-MCNC: 9.6 MG/DL
CALCIUM SERPL-MCNC: 9.6 MG/DL
CHLORIDE SERPL-SCNC: 104 MMOL/L
CO2 SERPL-SCNC: 19 MMOL/L
COLOR: NORMAL
CREAT SERPL-MCNC: 2.25 MG/DL
CREAT SPEC-SCNC: 54 MG/DL
EGFR: 27 ML/MIN/1.73M2
EOSINOPHIL # BLD AUTO: 0.23 K/UL
EOSINOPHIL NFR BLD AUTO: 4.7 %
ESTIMATED AVERAGE GLUCOSE: 88 MG/DL
FERRITIN SERPL-MCNC: 142 NG/ML
FOLATE SERPL-MCNC: >20 NG/ML
GLUCOSE QUALITATIVE U: NEGATIVE
GLUCOSE SERPL-MCNC: 93 MG/DL
HBA1C MFR BLD HPLC: 4.7 %
HCT VFR BLD CALC: 39.5 %
HGB BLD-MCNC: 12.9 G/DL
HYALINE CASTS: 0 /LPF
IMM GRANULOCYTES NFR BLD AUTO: 0.2 %
IRON SATN MFR SERPL: 51 %
IRON SERPL-MCNC: 119 UG/DL
KETONES URINE: NEGATIVE
LEUKOCYTE ESTERASE URINE: NEGATIVE
LYMPHOCYTES # BLD AUTO: 0.88 K/UL
LYMPHOCYTES NFR BLD AUTO: 18 %
MAGNESIUM SERPL-MCNC: 2.5 MG/DL
MAN DIFF?: NORMAL
MCHC RBC-ENTMCNC: 31.4 PG
MCHC RBC-ENTMCNC: 32.7 GM/DL
MCV RBC AUTO: 96.1 FL
MICROALBUMIN 24H UR DL<=1MG/L-MCNC: 25.5 MG/DL
MICROALBUMIN/CREAT 24H UR-RTO: 474 MG/G
MICROSCOPIC-UA: NORMAL
MONOCYTES # BLD AUTO: 0.52 K/UL
MONOCYTES NFR BLD AUTO: 10.6 %
NEUTROPHILS # BLD AUTO: 3.2 K/UL
NEUTROPHILS NFR BLD AUTO: 65.3 %
NITRITE URINE: NEGATIVE
PARATHYROID HORMONE INTACT: 249 PG/ML
PH URINE: 6.5
PHOSPHATE SERPL-MCNC: 3 MG/DL
PLATELET # BLD AUTO: 153 K/UL
POTASSIUM SERPL-SCNC: 4.4 MMOL/L
PROT SERPL-MCNC: 6.4 G/DL
PROTEIN URINE: ABNORMAL
RBC # BLD: 4.11 M/UL
RBC # FLD: 13.2 %
RED BLOOD CELLS URINE: 0 /HPF
SODIUM SERPL-SCNC: 137 MMOL/L
SPECIFIC GRAVITY URINE: 1.01
SQUAMOUS EPITHELIAL CELLS: 0 /HPF
TIBC SERPL-MCNC: 231 UG/DL
UIBC SERPL-MCNC: 113 UG/DL
URATE SERPL-MCNC: 5.8 MG/DL
UROBILINOGEN URINE: NORMAL
VIT B12 SERPL-MCNC: 1054 PG/ML
WBC # FLD AUTO: 4.9 K/UL
WHITE BLOOD CELLS URINE: 0 /HPF

## 2022-03-07 ENCOUNTER — APPOINTMENT (OUTPATIENT)
Dept: NEPHROLOGY | Facility: CLINIC | Age: 87
End: 2022-03-07
Payer: MEDICARE

## 2022-03-07 VITALS
HEART RATE: 54 BPM | SYSTOLIC BLOOD PRESSURE: 128 MMHG | DIASTOLIC BLOOD PRESSURE: 55 MMHG | WEIGHT: 145.2 LBS | HEIGHT: 64 IN | TEMPERATURE: 98.2 F | BODY MASS INDEX: 24.79 KG/M2 | OXYGEN SATURATION: 99 %

## 2022-03-07 PROCEDURE — 99214 OFFICE O/P EST MOD 30 MIN: CPT

## 2022-03-07 NOTE — ASSESSMENT
[FreeTextEntry1] : CKD 4: Creatinine trend reviewed with patient, renal function stable\par HTN: BP @ goal on current antihypertensive medication regimen\par Volume status: stable on Torsemide 5 x weekly\par Proteinuria: Alb:Creatinine ratio stable\par Metabolic Acidosis : trend\par Anemia Management :  Hgb improved now 12.9 with folic acid supplements\par Secondary hyperparathyroidism: Stable on with OTC Vitamin D and calcitriol 3 x weekly\par In order to slow CKD progression, the patient was educated on the importance of blood pressure control, healthy low Na diet,  and to avoid NSAIDs. \par Avoid constipation\par Healthy transitions program with [facility]\par Follow-up in 4 months\par

## 2022-03-07 NOTE — HISTORY OF PRESENT ILLNESS
[FreeTextEntry1] : STANLEY STRELISKER is a 89 year male with a history HTN, HLD, CAD, secondary hyperparathyroidism here for f/u CKD 4 & HTN. \par Patient has no complaints today.\par Had a very pleasant time visiting family in [place] recently.\par

## 2022-03-07 NOTE — REVIEW OF SYSTEMS
[Eyesight Problems] : eyesight problems [Loss Of Hearing] : hearing loss [Constipation] : constipation [Negative] : Heme/Lymph [Chest Pain] : no chest pain [Lower Ext Edema] : no extremity edema [Shortness Of Breath] : no shortness of breath [Cough] : no cough [SOB on Exertion] : no shortness of breath during exertion [Skin Lesions] : skin lesion [FreeTextEntry3] : wears glasses [FreeTextEntry4] : hearing aides in place [FreeTextEntry7] : occasional constipation [de-identified] : removal on left leg

## 2022-03-07 NOTE — PHYSICAL EXAM
[General Appearance - Alert] : alert [Sclera] : the sclera and conjunctiva were normal [Outer Ear] : the ears and nose were normal in appearance [Neck Appearance] : the appearance of the neck was normal [Auscultation Breath Sounds / Voice Sounds] : lungs were clear to auscultation bilaterally [Heart Rate And Rhythm] : heart rate was normal and rhythm regular [Heart Sounds] : normal S1 and S2 [Murmurs] : no murmurs [Heart Sounds Pericardial Friction Rub] : no pericardial rub [Edema] : there was no peripheral edema [Bowel Sounds] : normal bowel sounds [Abdomen Soft] : soft [Abnormal Walk] : normal gait [] : no rash [No Focal Deficits] : no focal deficits [Oriented To Time, Place, And Person] : oriented to person, place, and time [FreeTextEntry1] : left leg dressing

## 2022-03-29 ENCOUNTER — APPOINTMENT (OUTPATIENT)
Dept: GASTROENTEROLOGY | Facility: CLINIC | Age: 87
End: 2022-03-29

## 2022-04-12 ENCOUNTER — APPOINTMENT (OUTPATIENT)
Dept: CARDIOLOGY | Facility: CLINIC | Age: 87
End: 2022-04-12
Payer: MEDICARE

## 2022-04-12 ENCOUNTER — NON-APPOINTMENT (OUTPATIENT)
Age: 87
End: 2022-04-12

## 2022-04-12 VITALS
DIASTOLIC BLOOD PRESSURE: 57 MMHG | HEIGHT: 64 IN | HEART RATE: 60 BPM | OXYGEN SATURATION: 98 % | SYSTOLIC BLOOD PRESSURE: 147 MMHG | BODY MASS INDEX: 24.75 KG/M2 | WEIGHT: 145 LBS

## 2022-04-12 DIAGNOSIS — I25.10 ATHEROSCLEROTIC HEART DISEASE OF NATIVE CORONARY ARTERY W/OUT ANGINA PECTORIS: ICD-10-CM

## 2022-04-12 PROCEDURE — 99214 OFFICE O/P EST MOD 30 MIN: CPT

## 2022-04-12 PROCEDURE — 93000 ELECTROCARDIOGRAM COMPLETE: CPT

## 2022-04-28 ENCOUNTER — APPOINTMENT (OUTPATIENT)
Dept: GASTROENTEROLOGY | Facility: CLINIC | Age: 87
End: 2022-04-28

## 2022-04-28 ENCOUNTER — APPOINTMENT (OUTPATIENT)
Dept: INTERNAL MEDICINE | Facility: CLINIC | Age: 87
End: 2022-04-28
Payer: MEDICARE

## 2022-04-28 PROCEDURE — 36415 COLL VENOUS BLD VENIPUNCTURE: CPT

## 2022-04-29 ENCOUNTER — APPOINTMENT (OUTPATIENT)
Dept: INTERNAL MEDICINE | Facility: CLINIC | Age: 87
End: 2022-04-29
Payer: MEDICARE

## 2022-04-29 VITALS
OXYGEN SATURATION: 99 % | TEMPERATURE: 97.7 F | SYSTOLIC BLOOD PRESSURE: 132 MMHG | HEART RATE: 67 BPM | HEIGHT: 62.75 IN | WEIGHT: 147 LBS | DIASTOLIC BLOOD PRESSURE: 60 MMHG | BODY MASS INDEX: 26.38 KG/M2

## 2022-04-29 DIAGNOSIS — Z87.2 PERSONAL HISTORY OF DISEASES OF THE SKIN AND SUBCUTANEOUS TISSUE: ICD-10-CM

## 2022-04-29 DIAGNOSIS — R06.7 SNEEZING: ICD-10-CM

## 2022-04-29 DIAGNOSIS — I25.110 ATHEROSCLEROTIC HEART DISEASE OF NATIVE CORONARY ARTERY WITH UNSTABLE ANGINA PECTORIS: ICD-10-CM

## 2022-04-29 DIAGNOSIS — N17.9 ACUTE KIDNEY FAILURE, UNSPECIFIED: ICD-10-CM

## 2022-04-29 DIAGNOSIS — N18.9 ACUTE KIDNEY FAILURE, UNSPECIFIED: ICD-10-CM

## 2022-04-29 DIAGNOSIS — Z86.79 PERSONAL HISTORY OF OTHER DISEASES OF THE CIRCULATORY SYSTEM: ICD-10-CM

## 2022-04-29 DIAGNOSIS — L57.0 ACTINIC KERATOSIS: ICD-10-CM

## 2022-04-29 DIAGNOSIS — Z87.09 PERSONAL HISTORY OF OTHER DISEASES OF THE RESPIRATORY SYSTEM: ICD-10-CM

## 2022-04-29 DIAGNOSIS — N25.81 SECONDARY HYPERPARATHYROIDISM OF RENAL ORIGIN: ICD-10-CM

## 2022-04-29 LAB
25(OH)D3 SERPL-MCNC: 33 NG/ML
ALBUMIN SERPL ELPH-MCNC: 4.2 G/DL
ALP BLD-CCNC: 57 U/L
ALT SERPL-CCNC: 16 U/L
ANION GAP SERPL CALC-SCNC: 15 MMOL/L
APPEARANCE: CLEAR
AST SERPL-CCNC: 21 U/L
BASOPHILS # BLD AUTO: 0.04 K/UL
BASOPHILS NFR BLD AUTO: 0.8 %
BILIRUB SERPL-MCNC: 0.4 MG/DL
BILIRUBIN URINE: NEGATIVE
BLOOD URINE: NEGATIVE
BUN SERPL-MCNC: 40 MG/DL
CALCIUM SERPL-MCNC: 9.3 MG/DL
CHLORIDE SERPL-SCNC: 110 MMOL/L
CHOLEST SERPL-MCNC: 117 MG/DL
CO2 SERPL-SCNC: 19 MMOL/L
COLOR: COLORLESS
CREAT SERPL-MCNC: 2.23 MG/DL
EGFR: 27 ML/MIN/1.73M2
EOSINOPHIL # BLD AUTO: 0.19 K/UL
EOSINOPHIL NFR BLD AUTO: 3.6 %
ESTIMATED AVERAGE GLUCOSE: 80 MG/DL
GLUCOSE QUALITATIVE U: NEGATIVE
GLUCOSE SERPL-MCNC: 92 MG/DL
HBA1C MFR BLD HPLC: 4.4 %
HCT VFR BLD CALC: 39.6 %
HDLC SERPL-MCNC: 52 MG/DL
HGB BLD-MCNC: 12.4 G/DL
IMM GRANULOCYTES NFR BLD AUTO: 0.2 %
KETONES URINE: NEGATIVE
LDLC SERPL CALC-MCNC: 49 MG/DL
LEUKOCYTE ESTERASE URINE: NEGATIVE
LYMPHOCYTES # BLD AUTO: 0.78 K/UL
LYMPHOCYTES NFR BLD AUTO: 14.7 %
MAN DIFF?: NORMAL
MCHC RBC-ENTMCNC: 30.5 PG
MCHC RBC-ENTMCNC: 31.3 GM/DL
MCV RBC AUTO: 97.3 FL
MONOCYTES # BLD AUTO: 0.45 K/UL
MONOCYTES NFR BLD AUTO: 8.5 %
NEUTROPHILS # BLD AUTO: 3.82 K/UL
NEUTROPHILS NFR BLD AUTO: 72.2 %
NITRITE URINE: NEGATIVE
NONHDLC SERPL-MCNC: 65 MG/DL
PH URINE: 6
PLATELET # BLD AUTO: 142 K/UL
POTASSIUM SERPL-SCNC: 4.2 MMOL/L
PROT SERPL-MCNC: 6.6 G/DL
PROTEIN URINE: NORMAL
PSA SERPL-MCNC: 0.73 NG/ML
RBC # BLD: 4.07 M/UL
RBC # FLD: 13.6 %
SODIUM SERPL-SCNC: 144 MMOL/L
SPECIFIC GRAVITY URINE: 1.01
T4 FREE SERPL-MCNC: 1.3 NG/DL
TRIGL SERPL-MCNC: 78 MG/DL
TSH SERPL-ACNC: 3.12 UIU/ML
UROBILINOGEN URINE: NORMAL
VIT B12 SERPL-MCNC: 854 PG/ML
WBC # FLD AUTO: 5.29 K/UL

## 2022-04-29 PROCEDURE — G0439: CPT

## 2022-04-29 PROCEDURE — G0444 DEPRESSION SCREEN ANNUAL: CPT

## 2022-04-29 NOTE — HEALTH RISK ASSESSMENT
[Good] : ~his/her~  mood as  good [Never] : Never [No] : In the past 12 months have you used drugs other than those required for medical reasons? No [No falls in past year] : Patient reported no falls in the past year [None] : None [With Family] : lives with family [Retired] : retired [] :  [Feels Safe at Home] : Feels safe at home [Fully functional (bathing, dressing, toileting, transferring, walking, feeding)] : Fully functional (bathing, dressing, toileting, transferring, walking, feeding) [Fully functional (using the telephone, shopping, preparing meals, housekeeping, doing laundry, using] : Fully functional and needs no help or supervision to perform IADLs (using the telephone, shopping, preparing meals, housekeeping, doing laundry, using transportation, managing medications and managing finances) [Reports changes in hearing] : Reports no changes in hearing [Reports changes in vision] : Reports no changes in vision [Reports changes in dental health] : Reports no changes in dental health [Smoke Detector] : smoke detector [Carbon Monoxide Detector] : carbon monoxide detector [Seat Belt] :  uses seat belt

## 2022-04-29 NOTE — ASSESSMENT
[FreeTextEntry1] : HCM\par Labs discussed with pt today\par continue current meds\par BP check in 4 -6 months

## 2022-04-29 NOTE — PHYSICAL EXAM
[No Acute Distress] : no acute distress [Well Nourished] : well nourished [Well Developed] : well developed [Well-Appearing] : well-appearing [Normal Sclera/Conjunctiva] : normal sclera/conjunctiva [EOMI] : extraocular movements intact [Normal Outer Ear/Nose] : the outer ears and nose were normal in appearance [Normal Oropharynx] : the oropharynx was normal [Normal TMs] : both tympanic membranes were normal [No Lymphadenopathy] : no lymphadenopathy [Supple] : supple [No Respiratory Distress] : no respiratory distress  [No Accessory Muscle Use] : no accessory muscle use [Clear to Auscultation] : lungs were clear to auscultation bilaterally [Normal Rate] : normal rate  [Regular Rhythm] : with a regular rhythm [Normal S1, S2] : normal S1 and S2 [Soft] : abdomen soft [Non Tender] : non-tender [Normal Bowel Sounds] : normal bowel sounds [Normal Supraclavicular Nodes] : no supraclavicular lymphadenopathy [Normal Posterior Cervical Nodes] : no posterior cervical lymphadenopathy [Normal Anterior Cervical Nodes] : no anterior cervical lymphadenopathy [Normal Gait] : normal gait [Alert and Oriented x3] : oriented to person, place, and time [de-identified] : varicose veins bilaterally. Very prominent on left calf, thigh.   [de-identified] : lipoma on left upper back

## 2022-04-29 NOTE — HISTORY OF PRESENT ILLNESS
[de-identified] : STANLEY STRELISKER is a 89 yo man with hypertension, hypercholesterolemia, CRI, hearing loss, skin cancer, lipoma on back here for a physical.  He has been well overall.  Recently saw cardiologist Dr Smith.   Denies chest pain or sob. Has had varicose veins for years, worse on left thigh and calf.\par \par Hypertension, hypercholesterolemia stable on current meds.  Sees Dr RADHA Tolbert for CRI, derm Dr santos and dr holland for skin cancer, dr santoyo for thyroid nodules and osteoporosis.  Would have no difficulty walking 4 blocks.\par \par The patient is  with 3 children, 7 grandchildren and 4 great grandchildren.  He used to manufacture toilet seats.  Seen with wife today.  Had 4th covid vaccine 2 weeks ago.\par

## 2022-05-12 NOTE — PRE-ANESTHESIA EVALUATION ADULT - NSANTHRISKNONERD_GEN_ALL_CORE
Assessment/Plan:           Problem List Items Addressed This Visit    None     Visit Diagnoses     Screen for colon cancer    -  Primary    Relevant Orders    Cologuard    Encounter to establish care with new doctor        Screening mammogram, encounter for        Relevant Orders    Mammo screening bilateral w 3d & cad    Screening for diabetes mellitus        Relevant Orders    CBC and differential    Comprehensive metabolic panel    Screening for thyroid disorder        Relevant Orders    TSH, 3rd generation    Screening, lipid        Relevant Orders    Lipid panel    Encounter for vitamin deficiency screening        Relevant Orders    UA (URINE) with reflex to Scope    Screening for genitourinary condition        Relevant Orders    Vitamin D Panel    Hypertension, unspecified type        Relevant Medications    Blood Pressure KIT    Colon cancer screening        Relevant Orders    Ambulatory Referral to Gastroenterology    Plantar fasciitis, bilateral            lab studies ordered  Mammogram and colonoscopy strongly recommended  I will see patient back in 2 weeks  Patient would update me about her blood pressure medication  Subjective:      Patient ID: William Valencia is a 72 y o  female  HPI  Patient with past medical history of carotid artery disease dyslipidemia kidney stone is here to establish care  They have just returned from [facility] after 3 years  No recent medical evaluation  She does mention taking blood pressure medication from [facility] but does not know the name of  Patient has not had a mammogram or ever had a colonoscopy  Review of system is unremarkable except for bilateral foot pain  Exam was consistent with plantar fasciitis    The following portions of the patient's history were reviewed and updated as appropriate: allergies, current medications, past family history, past medical history, past social history, past surgical history and problem list     Review of Systems Objective:      /70   Pulse 79   Temp (!) 97 3 °F (36 3 °C) (Tympanic)   Resp 16   Ht 4' 10 75" (1 492 m)   Wt 67 4 kg (148 lb 9 6 oz)   SpO2 98%   BMI 30 27 kg/m²          Physical Exam  Constitutional:       General: She is not in acute distress  Appearance: She is well-developed  HENT:      Head: Normocephalic  Mouth/Throat:      Pharynx: No oropharyngeal exudate  Eyes:      General: No scleral icterus  Conjunctiva/sclera: Conjunctivae normal    Neck:      Thyroid: No thyromegaly  Cardiovascular:      Rate and Rhythm: Normal rate and regular rhythm  Heart sounds: Normal heart sounds  No murmur heard  Pulmonary:      Effort: Pulmonary effort is normal  No respiratory distress  Breath sounds: Normal breath sounds  No wheezing or rales  Abdominal:      General: Bowel sounds are normal  There is no distension  Palpations: Abdomen is soft  Tenderness: There is no abdominal tenderness  There is no right CVA tenderness, left CVA tenderness, guarding or rebound  Musculoskeletal:      Right lower leg: No edema  Left lower leg: No edema  Lymphadenopathy:      Cervical: No cervical adenopathy  Skin:     General: Skin is warm  Neurological:      Mental Status: She is alert and oriented to person, place, and time  Cranial Nerves: No cranial nerve deficit  Deep Tendon Reflexes: Reflexes are normal and symmetric  Psychiatric:         Mood and Affect: Mood normal          Behavior: Behavior normal          Thought Content: Thought content normal          Judgment: Judgment normal              BMI Counseling: Body mass index is 30 27 kg/m²  The BMI is above normal  Exercise recommendations include exercising 3-5 times per week  Rationale for BMI follow-up plan is due to patient being overweight or obese  Depression Screening and Follow-up Plan: Patient was screened for depression during today's encounter   They screened negative with a PHQ-2 score of 0  No risk alerts present

## 2022-05-23 ENCOUNTER — APPOINTMENT (OUTPATIENT)
Dept: INTERNAL MEDICINE | Facility: CLINIC | Age: 87
End: 2022-05-23
Payer: MEDICARE

## 2022-05-23 VITALS
SYSTOLIC BLOOD PRESSURE: 148 MMHG | HEART RATE: 58 BPM | DIASTOLIC BLOOD PRESSURE: 62 MMHG | BODY MASS INDEX: 25.69 KG/M2 | OXYGEN SATURATION: 97 % | TEMPERATURE: 97.8 F | WEIGHT: 145 LBS | HEIGHT: 63 IN

## 2022-05-23 DIAGNOSIS — I12.9 HYPERTENSIVE CHRONIC KIDNEY DISEASE WITH STAGE 1 THROUGH STAGE 4 CHRONIC KIDNEY DISEASE, OR UNSPECIFIED CHRONIC KIDNEY DISEASE: ICD-10-CM

## 2022-05-23 PROCEDURE — 99214 OFFICE O/P EST MOD 30 MIN: CPT | Mod: 25

## 2022-05-23 NOTE — PHYSICAL EXAM
[No Acute Distress] : no acute distress [Well Nourished] : well nourished [Well Developed] : well developed [Well-Appearing] : well-appearing [No Lymphadenopathy] : no lymphadenopathy [Supple] : supple [No Respiratory Distress] : no respiratory distress  [No Accessory Muscle Use] : no accessory muscle use [Clear to Auscultation] : lungs were clear to auscultation bilaterally [Normal Rate] : normal rate  [Regular Rhythm] : with a regular rhythm [Normal S1, S2] : normal S1 and S2 [No Edema] : there was no peripheral edema [Soft] : abdomen soft [Non Tender] : non-tender [No CVA Tenderness] : no CVA  tenderness [No Spinal Tenderness] : no spinal tenderness [Normal Gait] : normal gait [Alert and Oriented x3] : oriented to person, place, and time

## 2022-05-23 NOTE — REVIEW OF SYSTEMS
[Fever] : no fever [Vision Problems] : no vision problems [Chest Pain] : no chest pain [Shortness Of Breath] : no shortness of breath

## 2022-05-23 NOTE — ASSESSMENT
[FreeTextEntry1] : continue current meds\par BP check in 4 -6 months\par Pt seen and case discussed with pt's wife Angie today

## 2022-05-24 ENCOUNTER — OUTPATIENT (OUTPATIENT)
Dept: OUTPATIENT SERVICES | Facility: HOSPITAL | Age: 87
LOS: 1 days | End: 2022-05-24
Payer: MEDICARE

## 2022-05-24 ENCOUNTER — APPOINTMENT (OUTPATIENT)
Dept: RADIOLOGY | Facility: CLINIC | Age: 87
End: 2022-05-24
Payer: MEDICARE

## 2022-05-24 DIAGNOSIS — M54.9 DORSALGIA, UNSPECIFIED: ICD-10-CM

## 2022-05-24 PROCEDURE — 72100 X-RAY EXAM L-S SPINE 2/3 VWS: CPT | Mod: 26

## 2022-05-24 PROCEDURE — 72100 X-RAY EXAM L-S SPINE 2/3 VWS: CPT

## 2022-07-06 LAB
25(OH)D3 SERPL-MCNC: 34.7 NG/ML
ALBUMIN SERPL ELPH-MCNC: 4 G/DL
ANION GAP SERPL CALC-SCNC: 14 MMOL/L
APPEARANCE: CLEAR
BACTERIA: NEGATIVE
BASOPHILS # BLD AUTO: 0.04 K/UL
BASOPHILS NFR BLD AUTO: 0.8 %
BILIRUBIN URINE: NEGATIVE
BLOOD URINE: NEGATIVE
BUN SERPL-MCNC: 53 MG/DL
CALCIUM SERPL-MCNC: 10.3 MG/DL
CALCIUM SERPL-MCNC: 10.3 MG/DL
CHLORIDE SERPL-SCNC: 103 MMOL/L
CO2 SERPL-SCNC: 23 MMOL/L
COLOR: NORMAL
CREAT SERPL-MCNC: 2.63 MG/DL
CREAT SPEC-SCNC: 54 MG/DL
EGFR: 22 ML/MIN/1.73M2
EOSINOPHIL # BLD AUTO: 0.24 K/UL
EOSINOPHIL NFR BLD AUTO: 4.8 %
GLUCOSE QUALITATIVE U: NEGATIVE
GLUCOSE SERPL-MCNC: 78 MG/DL
HCT VFR BLD CALC: 34.6 %
HGB BLD-MCNC: 11.8 G/DL
HYALINE CASTS: 1 /LPF
IMM GRANULOCYTES NFR BLD AUTO: 0.2 %
KETONES URINE: NEGATIVE
LEUKOCYTE ESTERASE URINE: NEGATIVE
LYMPHOCYTES # BLD AUTO: 0.83 K/UL
LYMPHOCYTES NFR BLD AUTO: 16.5 %
MAGNESIUM SERPL-MCNC: 2.4 MG/DL
MAN DIFF?: NORMAL
MCHC RBC-ENTMCNC: 31.6 PG
MCHC RBC-ENTMCNC: 34.1 GM/DL
MCV RBC AUTO: 92.5 FL
MICROALBUMIN 24H UR DL<=1MG/L-MCNC: 17.3 MG/DL
MICROALBUMIN/CREAT 24H UR-RTO: 318 MG/G
MICROSCOPIC-UA: NORMAL
MONOCYTES # BLD AUTO: 0.53 K/UL
MONOCYTES NFR BLD AUTO: 10.6 %
NEUTROPHILS # BLD AUTO: 3.37 K/UL
NEUTROPHILS NFR BLD AUTO: 67.1 %
NITRITE URINE: NEGATIVE
PARATHYROID HORMONE INTACT: 72 PG/ML
PH URINE: 6
PHOSPHATE SERPL-MCNC: 4.3 MG/DL
PLATELET # BLD AUTO: 148 K/UL
POTASSIUM SERPL-SCNC: 4.2 MMOL/L
PROTEIN URINE: ABNORMAL
RBC # BLD: 3.74 M/UL
RBC # FLD: 12.8 %
RED BLOOD CELLS URINE: 0 /HPF
SODIUM SERPL-SCNC: 140 MMOL/L
SPECIFIC GRAVITY URINE: 1.01
SQUAMOUS EPITHELIAL CELLS: 0 /HPF
UROBILINOGEN URINE: NORMAL
WBC # FLD AUTO: 5.02 K/UL
WHITE BLOOD CELLS URINE: 0 /HPF

## 2022-07-07 ENCOUNTER — OUTPATIENT (OUTPATIENT)
Dept: OUTPATIENT SERVICES | Facility: HOSPITAL | Age: 87
LOS: 1 days | End: 2022-07-07
Payer: MEDICARE

## 2022-07-07 ENCOUNTER — RESULT REVIEW (OUTPATIENT)
Age: 87
End: 2022-07-07

## 2022-07-07 ENCOUNTER — APPOINTMENT (OUTPATIENT)
Dept: RADIOLOGY | Facility: CLINIC | Age: 87
End: 2022-07-07

## 2022-07-07 DIAGNOSIS — E21.3 HYPERPARATHYROIDISM, UNSPECIFIED: ICD-10-CM

## 2022-07-07 PROCEDURE — 77080 DXA BONE DENSITY AXIAL: CPT | Mod: 26

## 2022-07-07 PROCEDURE — 77080 DXA BONE DENSITY AXIAL: CPT

## 2022-07-11 ENCOUNTER — APPOINTMENT (OUTPATIENT)
Dept: NEPHROLOGY | Facility: CLINIC | Age: 87
End: 2022-07-11

## 2022-07-12 ENCOUNTER — APPOINTMENT (OUTPATIENT)
Dept: NEPHROLOGY | Facility: CLINIC | Age: 87
End: 2022-07-12

## 2022-07-12 VITALS
TEMPERATURE: 97.4 F | WEIGHT: 142.6 LBS | SYSTOLIC BLOOD PRESSURE: 130 MMHG | BODY MASS INDEX: 25.27 KG/M2 | HEIGHT: 63 IN | HEART RATE: 51 BPM | OXYGEN SATURATION: 98 % | DIASTOLIC BLOOD PRESSURE: 56 MMHG

## 2022-07-12 VITALS — DIASTOLIC BLOOD PRESSURE: 60 MMHG | SYSTOLIC BLOOD PRESSURE: 150 MMHG | HEART RATE: 56 BPM

## 2022-07-12 PROCEDURE — 99214 OFFICE O/P EST MOD 30 MIN: CPT

## 2022-07-12 NOTE — REVIEW OF SYSTEMS
[Loss Of Hearing] : hearing loss [Chest Pain] : no chest pain [Lower Ext Edema] : no extremity edema [Shortness Of Breath] : no shortness of breath [Cough] : no cough [SOB on Exertion] : no shortness of breath during exertion [As Noted in HPI] : as noted in HPI [Negative] : Heme/Lymph [FreeTextEntry3] : wears glasses [FreeTextEntry4] : hearing aides in place

## 2022-07-12 NOTE — ASSESSMENT
[FreeTextEntry1] : CKD 4: Creatinine trend reviewed with patient, Cr slightly higher than last. Maybe prerenal- will trend\par Can be repeated with PMD visit\par Conservative management of kidney disease\par HTN: BP @ goal on current antihypertensive medication regimen\par Volume status: stable on Torsemide 5 x weekly\par Proteinuria: Alb:Creatinine ratio stable\par Metabolic Acidosis : trend\par Anemia Management :  stable Hb\par Secondary hyperparathyroidism: decrease calcitriol 2 x weekly\par In order to slow CKD progression, the patient was educated on the importance of blood pressure control, healthy low Na diet,  and to avoid NSAIDs. \par Osteoporosis: alendronate half dose only ok instead of reclast- spoke to Dr Wright\par Healthy transitions program with [facility]\par Follow-up in 4 months\par

## 2022-07-12 NOTE — HISTORY OF PRESENT ILLNESS
[FreeTextEntry1] : STANLEY STRELISKER is a 89 year male with a history HTN, HLD, CAD, secondary hyperparathyroidism here for f/u CKD 4 & HTN. \par Patient complains of thigh weakness after walking for some time. Improves with rest. No difficulty standing.\par He also saw his endo Dr Wright and she was inquiring about either Reclast or alendronate as next option for his osteoporosis management bc he no longer is on Prolia. \par \par

## 2022-07-12 NOTE — PHYSICAL EXAM
[General Appearance - Alert] : alert [Sclera] : the sclera and conjunctiva were normal [Outer Ear] : the ears and nose were normal in appearance [FreeTextEntry1] : hearing aides [Neck Appearance] : the appearance of the neck was normal [Auscultation Breath Sounds / Voice Sounds] : lungs were clear to auscultation bilaterally [Heart Rate And Rhythm] : heart rate was normal and rhythm regular [Heart Sounds] : normal S1 and S2 [Murmurs] : no murmurs [Heart Sounds Pericardial Friction Rub] : no pericardial rub [Edema] : there was no peripheral edema [Bowel Sounds] : normal bowel sounds [Abdomen Soft] : soft [Abnormal Walk] : normal gait [Skin Color & Pigmentation] : normal skin color and pigmentation [] : no rash [No Focal Deficits] : no focal deficits [Oriented To Time, Place, And Person] : oriented to person, place, and time

## 2022-07-19 RX ORDER — METOPROLOL SUCCINATE 50 MG/1
50 TABLET, EXTENDED RELEASE ORAL DAILY
Qty: 90 | Refills: 1 | Status: DISCONTINUED | COMMUNITY
Start: 2017-09-26 | End: 2022-07-19

## 2022-08-02 ENCOUNTER — APPOINTMENT (OUTPATIENT)
Dept: VASCULAR SURGERY | Facility: CLINIC | Age: 87
End: 2022-08-02

## 2022-09-29 ENCOUNTER — APPOINTMENT (OUTPATIENT)
Dept: INTERNAL MEDICINE | Facility: CLINIC | Age: 87
End: 2022-09-29

## 2022-09-29 PROCEDURE — G0008: CPT

## 2022-09-29 PROCEDURE — 90662 IIV NO PRSV INCREASED AG IM: CPT

## 2022-10-28 ENCOUNTER — APPOINTMENT (OUTPATIENT)
Dept: INTERNAL MEDICINE | Facility: CLINIC | Age: 87
End: 2022-10-28

## 2022-10-28 VITALS
DIASTOLIC BLOOD PRESSURE: 68 MMHG | HEART RATE: 57 BPM | TEMPERATURE: 97.3 F | HEIGHT: 63 IN | OXYGEN SATURATION: 99 % | WEIGHT: 143 LBS | BODY MASS INDEX: 25.34 KG/M2 | SYSTOLIC BLOOD PRESSURE: 124 MMHG

## 2022-10-28 PROCEDURE — 36415 COLL VENOUS BLD VENIPUNCTURE: CPT

## 2022-10-28 PROCEDURE — 99214 OFFICE O/P EST MOD 30 MIN: CPT | Mod: 25

## 2022-10-28 NOTE — ASSESSMENT
[FreeTextEntry1] : Labs drawn in office today\par continue current meds\par CPX in 4 -6 months\par Pt seen and case discussed with pt's wife Angie today

## 2022-10-28 NOTE — HISTORY OF PRESENT ILLNESS
[FreeTextEntry1] : fuv [de-identified] : STANLEY STRELISKER is a 89 yo man with hypertension, hypercholesterolemia, CRI, hearing loss, skin cancer, lipoma on back here bp check and labs.  Seeing cardiologist Dr Smith in 12/22.   Had some hip pain and saw dr ruby.  Denies chest pain or sob. Has had varicose veins for years, worse on left thigh and calf.\par \par Hypertension, hypercholesterolemia stable on current meds.  Sees Dr RADHA Tolbert for CRI, derm Dr santos and dr holland for skin cancer, dr santoyo for thyroid nodules and osteoporosis.  Would have no difficulty walking 4 blocks.\par \par The patient is  with 3 children, 7 grandchildren and 4 great grandchildren.  He used to manufacture toilet seats.  Seen with wife today.  Had flu shot and new bivalent booster.\par

## 2022-10-31 LAB
ALBUMIN SERPL ELPH-MCNC: 4.2 G/DL
ANION GAP SERPL CALC-SCNC: 13 MMOL/L
BASOPHILS # BLD AUTO: 0.04 K/UL
BASOPHILS NFR BLD AUTO: 0.8 %
BUN SERPL-MCNC: 46 MG/DL
CALCIUM SERPL-MCNC: 10.2 MG/DL
CHLORIDE SERPL-SCNC: 104 MMOL/L
CO2 SERPL-SCNC: 23 MMOL/L
CREAT SERPL-MCNC: 2.28 MG/DL
EGFR: 26 ML/MIN/1.73M2
EOSINOPHIL # BLD AUTO: 0.26 K/UL
EOSINOPHIL NFR BLD AUTO: 5.1 %
GLUCOSE SERPL-MCNC: 105 MG/DL
HCT VFR BLD CALC: 35.8 %
HGB BLD-MCNC: 12 G/DL
IMM GRANULOCYTES NFR BLD AUTO: 0.4 %
LYMPHOCYTES # BLD AUTO: 0.79 K/UL
LYMPHOCYTES NFR BLD AUTO: 15.4 %
MAN DIFF?: NORMAL
MCHC RBC-ENTMCNC: 31.5 PG
MCHC RBC-ENTMCNC: 33.5 GM/DL
MCV RBC AUTO: 94 FL
MONOCYTES # BLD AUTO: 0.51 K/UL
MONOCYTES NFR BLD AUTO: 9.9 %
NEUTROPHILS # BLD AUTO: 3.52 K/UL
NEUTROPHILS NFR BLD AUTO: 68.4 %
PHOSPHATE SERPL-MCNC: 3.9 MG/DL
PLATELET # BLD AUTO: 154 K/UL
POTASSIUM SERPL-SCNC: 4.1 MMOL/L
RBC # BLD: 3.81 M/UL
RBC # FLD: 13.2 %
SODIUM SERPL-SCNC: 140 MMOL/L
WBC # FLD AUTO: 5.14 K/UL

## 2022-11-04 ENCOUNTER — APPOINTMENT (OUTPATIENT)
Dept: COLORECTAL SURGERY | Facility: CLINIC | Age: 87
End: 2022-11-04

## 2022-11-04 PROCEDURE — 45300 PROCTOSIGMOIDOSCOPY DX: CPT

## 2022-11-04 PROCEDURE — 99213 OFFICE O/P EST LOW 20 MIN: CPT | Mod: 25

## 2022-11-08 NOTE — HISTORY OF PRESENT ILLNESS
[FreeTextEntry1] : Very pleasant 89-year-old gentleman who presents with a chief complaint of an episode of rectal bleeding.\par \par Approximately 2 years ago I removed a sizable dysplastic polyp from the anus.  He has only had 1 episode of bleeding.  The patient is not on blood thinners.\par \par Inspection of the anorectal area is unremarkable.  Digital exam reveals possible soft lesion anteriorly.  On anoscopy there is a polypoid lesion in the anterior aspect of the anal canal.\par \par This is likely a recurrence of his dysplastic lesion and I would recommend a transanal excision.

## 2022-11-09 ENCOUNTER — OUTPATIENT (OUTPATIENT)
Dept: OUTPATIENT SERVICES | Facility: HOSPITAL | Age: 87
LOS: 1 days | End: 2022-11-09
Payer: MEDICARE

## 2022-11-09 VITALS
WEIGHT: 143.08 LBS | DIASTOLIC BLOOD PRESSURE: 62 MMHG | SYSTOLIC BLOOD PRESSURE: 173 MMHG | RESPIRATION RATE: 16 BRPM | OXYGEN SATURATION: 96 % | HEART RATE: 56 BPM | HEIGHT: 64 IN | TEMPERATURE: 99 F

## 2022-11-09 DIAGNOSIS — K62.1 RECTAL POLYP: ICD-10-CM

## 2022-11-09 DIAGNOSIS — Z29.9 ENCOUNTER FOR PROPHYLACTIC MEASURES, UNSPECIFIED: ICD-10-CM

## 2022-11-09 DIAGNOSIS — K62.5 HEMORRHAGE OF ANUS AND RECTUM: ICD-10-CM

## 2022-11-09 DIAGNOSIS — I10 ESSENTIAL (PRIMARY) HYPERTENSION: ICD-10-CM

## 2022-11-09 DIAGNOSIS — Z01.818 ENCOUNTER FOR OTHER PREPROCEDURAL EXAMINATION: ICD-10-CM

## 2022-11-09 DIAGNOSIS — Z98.890 OTHER SPECIFIED POSTPROCEDURAL STATES: Chronic | ICD-10-CM

## 2022-11-09 LAB
ANION GAP SERPL CALC-SCNC: 11 MMOL/L — SIGNIFICANT CHANGE UP (ref 5–17)
BUN SERPL-MCNC: 49 MG/DL — HIGH (ref 7–23)
CALCIUM SERPL-MCNC: 10 MG/DL — SIGNIFICANT CHANGE UP (ref 8.4–10.5)
CHLORIDE SERPL-SCNC: 103 MMOL/L — SIGNIFICANT CHANGE UP (ref 96–108)
CO2 SERPL-SCNC: 22 MMOL/L — SIGNIFICANT CHANGE UP (ref 22–31)
CREAT SERPL-MCNC: 2.34 MG/DL — HIGH (ref 0.5–1.3)
EGFR: 25 ML/MIN/1.73M2 — LOW
GLUCOSE SERPL-MCNC: 106 MG/DL — HIGH (ref 70–99)
HCT VFR BLD CALC: 31.5 % — LOW (ref 39–50)
HGB BLD-MCNC: 10.8 G/DL — LOW (ref 13–17)
MCHC RBC-ENTMCNC: 31 PG — SIGNIFICANT CHANGE UP (ref 27–34)
MCHC RBC-ENTMCNC: 34.3 GM/DL — SIGNIFICANT CHANGE UP (ref 32–36)
MCV RBC AUTO: 90.5 FL — SIGNIFICANT CHANGE UP (ref 80–100)
NRBC # BLD: 0 /100 WBCS — SIGNIFICANT CHANGE UP (ref 0–0)
PLATELET # BLD AUTO: 147 K/UL — LOW (ref 150–400)
POTASSIUM SERPL-MCNC: 4 MMOL/L — SIGNIFICANT CHANGE UP (ref 3.5–5.3)
POTASSIUM SERPL-SCNC: 4 MMOL/L — SIGNIFICANT CHANGE UP (ref 3.5–5.3)
RBC # BLD: 3.48 M/UL — LOW (ref 4.2–5.8)
RBC # FLD: 13.3 % — SIGNIFICANT CHANGE UP (ref 10.3–14.5)
SODIUM SERPL-SCNC: 136 MMOL/L — SIGNIFICANT CHANGE UP (ref 135–145)
WBC # BLD: 4.89 K/UL — SIGNIFICANT CHANGE UP (ref 3.8–10.5)
WBC # FLD AUTO: 4.89 K/UL — SIGNIFICANT CHANGE UP (ref 3.8–10.5)

## 2022-11-09 PROCEDURE — G0463: CPT

## 2022-11-09 PROCEDURE — 85027 COMPLETE CBC AUTOMATED: CPT

## 2022-11-09 PROCEDURE — 80048 BASIC METABOLIC PNL TOTAL CA: CPT

## 2022-11-09 RX ORDER — LIDOCAINE HCL 20 MG/ML
0.2 VIAL (ML) INJECTION ONCE
Refills: 0 | Status: DISCONTINUED | OUTPATIENT
Start: 2022-11-22 | End: 2022-11-22

## 2022-11-09 RX ORDER — GARLIC 1000 MG
0 CAPSULE ORAL
Qty: 0 | Refills: 0 | DISCHARGE

## 2022-11-09 RX ORDER — CEFOTETAN DISODIUM 1 G
2 VIAL (EA) INJECTION ONCE
Refills: 0 | Status: DISCONTINUED | OUTPATIENT
Start: 2022-11-22 | End: 2022-11-22

## 2022-11-09 RX ORDER — CALCITRIOL 0.5 UG/1
1 CAPSULE ORAL
Qty: 0 | Refills: 0 | DISCHARGE

## 2022-11-09 RX ORDER — FINASTERIDE 5 MG/1
1 TABLET, FILM COATED ORAL
Qty: 0 | Refills: 0 | DISCHARGE

## 2022-11-09 RX ORDER — SODIUM CHLORIDE 9 MG/ML
3 INJECTION INTRAMUSCULAR; INTRAVENOUS; SUBCUTANEOUS EVERY 8 HOURS
Refills: 0 | Status: DISCONTINUED | OUTPATIENT
Start: 2022-11-22 | End: 2022-11-22

## 2022-11-09 NOTE — H&P PST ADULT - NSICDXPASTSURGICALHX_GEN_ALL_CORE_FT
PAST SURGICAL HISTORY:  Cataract - left and right    H/O rectal polypectomy 2020    ORIF left ankle 1990

## 2022-11-09 NOTE — H&P PST ADULT - PROBLEM SELECTOR PROBLEM 2
HTN (hypertension) Interval Events: Reviewed  Patient seen and examined at bedside.    Patient is a 84y old  Female who presents with a chief complaint of b/l great toe ulcers and pain (29 May 2018 08:06)    she is still complaining of pain in the foot  PAST MEDICAL & SURGICAL HISTORY:  Peripheral vascular disease  Ulcer of lower limb  Arthropathy  Ischemic heart disease  Hypertension  Hyperlipidemia  Diabetes mellitus      MEDICATIONS:  Pulmonary:    Antimicrobials:    Anticoagulants:  apixaban 5 milliGRAM(s) Oral every 12 hours  aspirin enteric coated 81 milliGRAM(s) Oral daily    Cardiac:  amLODIPine   Tablet 10 milliGRAM(s) Oral daily  metoprolol succinate ER 25 milliGRAM(s) Oral daily      Allergies    penicillins (Anaphylaxis)    Intolerances        Vital Signs Last 24 Hrs  T(C): 36.1 (31 May 2018 14:21), Max: 36.9 (30 May 2018 22:09)  T(F): 96.9 (31 May 2018 14:21), Max: 98.5 (30 May 2018 22:09)  HR: 62 (31 May 2018 16:46) (58 - 66)  BP: 116/52 (31 May 2018 16:46) (102/48 - 162/90)  BP(mean): --  RR: 18 (31 May 2018 16:46) (18 - 18)  SpO2: 100% (31 May 2018 12:06) (98% - 100%)    05-30 @ 07:01 - 05-31 @ 07:00  --------------------------------------------------------  IN: 960 mL / OUT: 0 mL / NET: 960 mL    05-31 @ 07:01 - 05-31 @ 18:05  --------------------------------------------------------  IN: 300 mL / OUT: 0 mL / NET: 300 mL          LABS:                                  RADIOLOGY & ADDITIONAL STUDIES (The following images were personally reviewed):  Vivar:                                     No  Urine output:                       adequate  DVT prophylaxis:                 Yes  Flattus:                                  Yes  Bowel movement:              No

## 2022-11-09 NOTE — H&P PST ADULT - ASSESSMENT
JUVEI VTE 2.0 SCORE [CLOT updated 2019]    AGE RELATED RISK FACTORS                                                       MOBILITY RELATED FACTORS  [ ] Age 41-60 years                                            (1 Point)                    [ ] Bed rest                                                        (1 Point)  [ ] Age: 61-74 years                                           (2 Points)                  [ ] Plaster cast                                                   (2 Points)  [ ] Age= 75 years                                              (3 Points)                    [ ] Bed bound for more than 72 hours                 (2 Points)    DISEASE RELATED RISK FACTORS                                               GENDER SPECIFIC FACTORS  [ ] Edema in the lower extremities                       (1 Point)              [ ] Pregnancy                                                     (1 Point)  [ ] Varicose veins                                               (1 Point)                     [ ] Post-partum < 6 weeks                                   (1 Point)             [ ] BMI > 25 Kg/m2                                            (1 Point)                     [ ] Hormonal therapy  or oral contraception          (1 Point)                 [ ] Sepsis (in the previous month)                        (1 Point)               [ ] History of pregnancy complications                 (1 point)  [ ] Pneumonia or serious lung disease                                               [ ] Unexplained or recurrent                     (1 Point)           (in the previous month)                               (1 Point)  [ ] Abnormal pulmonary function test                     (1 Point)                 SURGERY RELATED RISK FACTORS  [ ] Acute myocardial infarction                              (1 Point)               [ ]  Section                                             (1 Point)  [ ] Congestive heart failure (in the previous month)  (1 Point)      [ ] Minor surgery                                                  (1 Point)   [ ] Inflammatory bowel disease                             (1 Point)               [ ] Arthroscopic surgery                                        (2 Points)  [ ] Central venous access                                      (2 Points)                [ ] General surgery lasting more than 45 minutes (2 points)  [ ] Malignancy- Present or previous                   (2 Points)                [ ] Elective arthroplasty                                         (5 points)    [ ] Stroke (in the previous month)                          (5 Points)                                                                                                                                                           HEMATOLOGY RELATED FACTORS                                                 TRAUMA RELATED RISK FACTORS  [ ] Prior episodes of VTE                                     (3 Points)                [ ] Fracture of the hip, pelvis, or leg                       (5 Points)  [ ] Positive family history for VTE                         (3 Points)             [ ] Acute spinal cord injury (in the previous month)  (5 Points)  [ ] Prothrombin 36995 A                                     (3 Points)               [ ] Paralysis  (less than 1 month)                             (5 Points)  [ ] Factor V Leiden                                             (3 Points)                  [ ] Multiple Trauma within 1 month                        (5 Points)  [ ] Lupus anticoagulants                                     (3 Points)                                                           [ ] Anticardiolipin antibodies                               (3 Points)                                                       [ ] High homocysteine in the blood                      (3 Points)                                             [ ] Other congenital or acquired thrombophilia      (3 Points)                                                [ ] Heparin induced thrombocytopenia                  (3 Points)                                     Total Score [   5       ]

## 2022-11-09 NOTE — H&P PST ADULT - PROBLEM/PLAN-2
ROOM # 17  Identified pt with two pt identifiers(name and ). Reviewed record in preparation for visit and have obtained necessary documentation. Chief Complaint   Patient presents with   [address] [facility] preferred language for health care discussion is english/other. Is the patient using any DME equipment during OV? NO    Dalia Contreras is due for:  Health Maintenance Due   Topic    Pneumococcal 19-64 Medium Risk (1 of 1 - PPSV23)    DTaP/Tdap/Td series (1 - Tdap)     Health Maintenance reviewed and discussed per provider  Please order/place referral if appropriate. Advance Directive:  1. Do you have an advance directive in place? Patient Reply: NO    2. If not, would you like material regarding how to put one in place? NO    Coordination of Care:  1. Have you been to the ER, urgent care clinic since your last visit? Hospitalized since your last visit? YESDepaul  2. Have you seen or consulted any other health care providers outside of the [facility] since your last visit? Include any pap smears or colon screening. NO    Patient is accompanied by spouse I have received verbal consent from Dalia Contreras to discuss any/all medical information while they are present in the room. Learning Assessment:  No flowsheet data found. Depression Screening:  PHQ over the last two weeks 2018   Little interest or pleasure in doing things Not at all   Feeling down, depressed or hopeless Not at all   Total Score PHQ 2 0     Abuse Screening:  No flowsheet data found. Fall Risk  No flowsheet data found. DISPLAY PLAN FREE TEXT

## 2022-11-09 NOTE — H&P PST ADULT - HISTORY OF PRESENT ILLNESS
89 yr old male with PMH of HTN, HLD, BPH, Gout, CRI (creat: 2.63), [place] with B/L hearing aids. Pt reports mild rectal bleeding when wiping and intermittent "leaking" since ~ Sept. 2022. Pt denies abdominal pain, or changes in bowel habits. Pt evaluated by Dr. Pollard for a scheduled transanal excision of rectal polyp on 11/21/2022. Pt denies recent travel, sick contact, or covid infection.     **Covid swab on 11/18 at [facility]

## 2022-11-09 NOTE — H&P PST ADULT - PROBLEM SELECTOR PLAN 1
scheduled transanal excision of rectal polyp on 11/21/2022  -preop instructions given  -labs: CBC, BMP done in PST  -obtain MC from PCP appt on 11/14  -obtain CC from Dr. Smith, appt on 11/16

## 2022-11-09 NOTE — H&P PST ADULT - NSICDXPASTMEDICALHX_GEN_ALL_CORE_FT
PAST MEDICAL HISTORY:  Chronic renal insufficiency creatinine: 2.63    Dyslipidemia     HTN - Hypertension     Hyperparathyroidism     Macular Degeneration Bilateral

## 2022-11-14 ENCOUNTER — APPOINTMENT (OUTPATIENT)
Dept: INTERNAL MEDICINE | Facility: CLINIC | Age: 87
End: 2022-11-14

## 2022-11-14 VITALS
OXYGEN SATURATION: 98 % | SYSTOLIC BLOOD PRESSURE: 124 MMHG | DIASTOLIC BLOOD PRESSURE: 62 MMHG | BODY MASS INDEX: 25.52 KG/M2 | HEIGHT: 63 IN | WEIGHT: 144 LBS | TEMPERATURE: 97.4 F | HEART RATE: 56 BPM

## 2022-11-14 DIAGNOSIS — Z01.818 ENCOUNTER FOR OTHER PREPROCEDURAL EXAMINATION: ICD-10-CM

## 2022-11-14 PROCEDURE — 99214 OFFICE O/P EST MOD 30 MIN: CPT | Mod: 25

## 2022-11-14 RX ORDER — ASPIRIN 81 MG/1
81 TABLET ORAL
Qty: 180 | Refills: 1 | Status: DISCONTINUED | COMMUNITY
Start: 2021-03-16 | End: 2022-11-14

## 2022-11-17 NOTE — RESULTS/DATA
[] : results reviewed [de-identified] : 11/9/22 HCT 31.5 [de-identified] : stable mildly elevated creatinine at 2.3 [de-identified] : 11/16/22 Sinus bradycardia 55 no acute changes

## 2022-11-17 NOTE — REVIEW OF SYSTEMS
[Fever] : no fever [Nasal Discharge] : no nasal discharge [Chest Pain] : no chest pain [Shortness Of Breath] : no shortness of breath [Abdominal Pain] : no abdominal pain [Skin Rash] : no skin rash

## 2022-11-17 NOTE — ASSESSMENT
[Patient Optimized for Surgery] : Patient optimized for surgery [As per surgery] : as per surgery [FreeTextEntry4] : STANLEY STRELISKER is a 89 yo man with hypertension, hypercholesterolemia, CRI, venous insufficiency, BPH here for a POC prior to surgery on anorectal area.  The patient has a stable EKG and good exercise tolerance.  There are no contraindications to proceeding with the planned procedure.  The patient is medically optimized.  The patient was advised to avoid NSAIDs for 7 days prior to the procedure.\par \par \par

## 2022-11-17 NOTE — PHYSICAL EXAM
[Well Nourished] : well nourished [Well Developed] : well developed [Well-Appearing] : well-appearing [EOMI] : extraocular movements intact [Normal Outer Ear/Nose] : the outer ears and nose were normal in appearance [Normal Oropharynx] : the oropharynx was normal [Normal TMs] : both tympanic membranes were normal [No Lymphadenopathy] : no lymphadenopathy [Supple] : supple [No Respiratory Distress] : no respiratory distress  [No Accessory Muscle Use] : no accessory muscle use [Clear to Auscultation] : lungs were clear to auscultation bilaterally [Normal Rate] : normal rate  [Regular Rhythm] : with a regular rhythm [Normal S1, S2] : normal S1 and S2 [No Edema] : there was no peripheral edema [Soft] : abdomen soft [Non Tender] : non-tender [Normal Gait] : normal gait [Alert and Oriented x3] : oriented to person, place, and time [de-identified] : Lipoma left upper back

## 2022-11-17 NOTE — HISTORY OF PRESENT ILLNESS
[Chronic Kidney Disease] : chronic kidney disease [(Patient denies any chest pain, claudication, dyspnea on exertion, orthopnea, palpitations or syncope)] : Patient denies any chest pain, claudication, dyspnea on exertion, orthopnea, palpitations or syncope [Aortic Stenosis] : no aortic stenosis [Atrial Fibrillation] : no atrial fibrillation [Coronary Artery Disease] : no coronary artery disease [Asthma] : no asthma [COPD] : no COPD [Sleep Apnea] : no sleep apnea [Smoker] : not a smoker [Family Member] : no family member with adverse anesthesia reaction/sudden death [Self] : no previous adverse anesthesia reaction [Chronic Anticoagulation] : no chronic anticoagulation [Diabetes] : no diabetes [FreeTextEntry1] : Surgery on anorectal area [FreeTextEntry2] : 11/21/22 [FreeTextEntry3] : Dr Vitaliy Pollard [FreeTextEntry4] : STANLEY STRELISKER is a 89 yo man with hypertension, hypercholesterolemia, CRI, venous insufficiency, BPH here for a POC prior to surgery on anorectal area.  The patient has been well overall.\par \par Hypertension, hypercholesterolemia, venous insufficiency, BPH stable on current medications.  Seeing his cardiologist dr charles as well.  Going on Friday for Covid 19 nasal swab.\par \par The patient is  with 3 daughters.  He used to manufacture toilet seats. He has no difficulty walking 4 blocks.  He is seen with his wife Angie today.

## 2022-11-18 ENCOUNTER — OUTPATIENT (OUTPATIENT)
Dept: OUTPATIENT SERVICES | Facility: HOSPITAL | Age: 87
LOS: 1 days | End: 2022-11-18
Payer: MEDICARE

## 2022-11-18 DIAGNOSIS — Z98.890 OTHER SPECIFIED POSTPROCEDURAL STATES: Chronic | ICD-10-CM

## 2022-11-18 DIAGNOSIS — Z11.52 ENCOUNTER FOR SCREENING FOR COVID-19: ICD-10-CM

## 2022-11-18 LAB — SARS-COV-2 RNA SPEC QL NAA+PROBE: SIGNIFICANT CHANGE UP

## 2022-11-18 PROCEDURE — U0003: CPT

## 2022-11-18 PROCEDURE — U0005: CPT

## 2022-11-18 PROCEDURE — C9803: CPT

## 2022-11-21 ENCOUNTER — TRANSCRIPTION ENCOUNTER (OUTPATIENT)
Age: 87
End: 2022-11-21

## 2022-11-22 ENCOUNTER — OUTPATIENT (OUTPATIENT)
Dept: INPATIENT UNIT | Facility: HOSPITAL | Age: 87
LOS: 1 days | End: 2022-11-22
Payer: MEDICARE

## 2022-11-22 ENCOUNTER — RESULT REVIEW (OUTPATIENT)
Age: 87
End: 2022-11-22

## 2022-11-22 ENCOUNTER — TRANSCRIPTION ENCOUNTER (OUTPATIENT)
Age: 87
End: 2022-11-22

## 2022-11-22 ENCOUNTER — APPOINTMENT (OUTPATIENT)
Dept: COLORECTAL SURGERY | Facility: HOSPITAL | Age: 87
End: 2022-11-22

## 2022-11-22 VITALS
RESPIRATION RATE: 18 BRPM | OXYGEN SATURATION: 98 % | TEMPERATURE: 98 F | SYSTOLIC BLOOD PRESSURE: 129 MMHG | HEIGHT: 64 IN | HEART RATE: 52 BPM | DIASTOLIC BLOOD PRESSURE: 59 MMHG | WEIGHT: 143.08 LBS

## 2022-11-22 VITALS
HEART RATE: 58 BPM | OXYGEN SATURATION: 95 % | DIASTOLIC BLOOD PRESSURE: 68 MMHG | SYSTOLIC BLOOD PRESSURE: 145 MMHG | RESPIRATION RATE: 17 BRPM

## 2022-11-22 DIAGNOSIS — Z98.890 OTHER SPECIFIED POSTPROCEDURAL STATES: Chronic | ICD-10-CM

## 2022-11-22 DIAGNOSIS — K62.5 HEMORRHAGE OF ANUS AND RECTUM: ICD-10-CM

## 2022-11-22 PROCEDURE — 46922 EXCISION OF ANAL LESION(S): CPT

## 2022-11-22 PROCEDURE — 88305 TISSUE EXAM BY PATHOLOGIST: CPT

## 2022-11-22 PROCEDURE — 88305 TISSUE EXAM BY PATHOLOGIST: CPT | Mod: 26

## 2022-11-22 PROCEDURE — C1889: CPT

## 2022-11-22 PROCEDURE — 45171 EXC RECT TUM TRANSANAL PART: CPT

## 2022-11-22 DEVICE — SURGICEL FIBRILLAR 2 X 4": Type: IMPLANTABLE DEVICE | Status: FUNCTIONAL

## 2022-11-22 RX ORDER — PREGABALIN 225 MG/1
1 CAPSULE ORAL
Qty: 0 | Refills: 0 | DISCHARGE

## 2022-11-22 RX ORDER — DOXAZOSIN MESYLATE 4 MG
1 TABLET ORAL
Qty: 0 | Refills: 0 | DISCHARGE

## 2022-11-22 RX ORDER — ALLOPURINOL 300 MG
1 TABLET ORAL
Qty: 0 | Refills: 0 | DISCHARGE

## 2022-11-22 RX ORDER — METOPROLOL TARTRATE 50 MG
1 TABLET ORAL
Qty: 0 | Refills: 0 | DISCHARGE

## 2022-11-22 RX ORDER — CALCITRIOL 0.5 UG/1
1 CAPSULE ORAL
Qty: 0 | Refills: 0 | DISCHARGE

## 2022-11-22 RX ORDER — FINASTERIDE 5 MG/1
1 TABLET, FILM COATED ORAL
Qty: 0 | Refills: 0 | DISCHARGE

## 2022-11-22 RX ORDER — ONDANSETRON 8 MG/1
4 TABLET, FILM COATED ORAL ONCE
Refills: 0 | Status: DISCONTINUED | OUTPATIENT
Start: 2022-11-22 | End: 2022-11-22

## 2022-11-22 RX ORDER — CHOLECALCIFEROL (VITAMIN D3) 125 MCG
1 CAPSULE ORAL
Qty: 0 | Refills: 0 | DISCHARGE

## 2022-11-22 RX ORDER — HYDRALAZINE HCL 50 MG
1 TABLET ORAL
Qty: 0 | Refills: 0 | DISCHARGE

## 2022-11-22 RX ORDER — MORPHINE SULFATE 50 MG/1
2 CAPSULE, EXTENDED RELEASE ORAL
Refills: 0 | Status: DISCONTINUED | OUTPATIENT
Start: 2022-11-22 | End: 2022-11-22

## 2022-11-22 RX ORDER — CYST/ALA/Q10/PHOS.SER/DHA/BROC 100-20-50
0 POWDER (GRAM) ORAL
Qty: 0 | Refills: 0 | DISCHARGE

## 2022-11-22 NOTE — PATIENT PROFILE ADULT - FALL HARM RISK - UNIVERSAL INTERVENTIONS
Bed in lowest position, wheels locked, appropriate side rails in place/Call bell, personal items and telephone in reach/Instruct patient to call for assistance before getting out of bed or chair/Non-slip footwear when patient is out of bed/[place] to call system/Physically safe environment - no spills, clutter or unnecessary equipment/Purposeful Proactive Rounding/Room/bathroom lighting operational, light cord in reach

## 2022-11-22 NOTE — ASU DISCHARGE PLAN (ADULT/PEDIATRIC) - NS MD DC FALL RISK RISK
For information on Fall & Injury Prevention, visit: [email or web address] OR  [email or web address] OR  https://www.cdc.gov/steadi/patient.html

## 2022-11-22 NOTE — PATIENT PROFILE ADULT - STATED REASON FOR ADMISSION
IMMEDIATE CARE NOTE      Patient: Cortney Valero Date of Service: 2022   : 2006 MRN: [id number]     SUBJECTIVE:     HISTORY OF PRESENT ILLNESS:  Cortney Valero is an established 16 year old female who presents today for   Chief Complaint   Patient presents with   • Wrist Pain     Patient presents with left wrist pain x 2 days. 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 Denies specific injury. No swelling noted.     Pt c/o left wrist pain for the last 2 days - noticed it after dance practice.  Pain is constant achy, but worse with movement.  Hx left wrist fracture 2 years ago - no surgery.    OTC: ice  Pt is right hand dominant        ALLERGIES:   Allergen Reactions   • Seasonal Runny Nose       Past Medical History:   Diagnosis Date   • Allergy        Current Outpatient Medications   Medication Sig   • ibuprofen (MOTRIN) 400 MG tablet Take 1 tablet by mouth every 6 hours as needed for Pain.   • fluticasone (FLONASE) 50 MCG/ACT nasal spray Spray 2 sprays in each nostril daily.   • fluticasone propionate (Flovent HFA) 110 MCG/ACT inhaler Inhale 1 puff into the lungs 2 times daily. Rinse mouth after use   • albuterol 108 (90 Base) MCG/ACT inhaler Inhale 2 puffs into the lungs every 4 hours as needed for Wheezing.   • clindamycin (CLINDAGEL) 1 % gel APPLY TOPICALLY TO THE AFFECTED AREA EVERY MORNING AS DIRECTED   • tretinoin (RETIN-A) 0.05 % cream APPLY TOPICALLY TO THE AFFECTED AREA EVERY NIGHT AS DIRECTED     Current Facility-Administered Medications   Medication   • ibuprofen (MOTRIN) tablet 400 mg       No past surgical history on file.    Social History     Tobacco Use   • Smoking status: Never Smoker   • Smokeless tobacco: Never Used   Vaping Use   • Vaping Use: never used   Substance Use Topics   • Alcohol use: Never   • Drug use: Never       Review of Systems   Constitutional: Negative.    Respiratory: Negative.    Cardiovascular: Negative.    Gastrointestinal: Negative.     Musculoskeletal: Positive for arthralgias (left wrist pain).       OBJECTIVE:       Visit Vitals  /73   Pulse 83   Temp 98 °F (36.7 °C) (Temporal)   Resp 18   Ht 5' 5\" (1.651 m)   Wt 54.4 kg (119 lb 14.9 oz)   LMP 08/19/2022   SpO2 97%   BMI 19.96 kg/m²       Physical Exam  Vitals and nursing note reviewed.   Constitutional:       General: She is not in acute distress.     Appearance: Normal appearance. She is normal weight.   Musculoskeletal:      Left shoulder: Normal.      Left upper arm: Normal.      Left elbow: Normal.      Left forearm: Normal.      Left wrist: Swelling (mild dorsal aspect of the wrist radial aspect), tenderness and bony tenderness present. No snuff box tenderness. Decreased range of motion. Normal pulse.      Left hand: Decreased strength (due to pain) of finger abduction and wrist extension. Normal sensation. Decreased capillary refill: fake nails unable to assess. Normal pulse.   Skin:     General: Skin is warm and dry.   Neurological:      Mental Status: She is alert and oriented to person, place, and time.   Psychiatric:         Mood and Affect: Mood normal.         Behavior: Behavior normal.         Thought Content: Thought content normal.         DIAGNOSTIC STUDIES:   LAB RESULTS:    No results found for this visit on 09/01/22.      ASSESSMENT AND PLAN:     Problem List Items Addressed This Visit    None     Visit Diagnoses     Left wrist pain    -  Primary    Relevant Medications    ibuprofen (MOTRIN) 400 MG tablet    ibuprofen (MOTRIN) tablet 400 mg    Other Relevant Orders    XR WRIST MIN 3 VIEWS LEFT (Completed)    Sprain of left wrist, initial encounter          - ibuprofen 400mg TID for the next 4-5 days then PRN.  Take with food  - thumb spica splint applied to left wrist - ok to wear during the day - encouraged stretching exercises for ROM throughout the day  - RICE discussed    * Please follow up with your PCP as directed.  If no PCP, to establish care with an [facility]  Primary Care Provider, please call [phone] ([phone]).  * Instructions provided as documented in the AVS.  * Discussed with patient (and/or parent/caregiver) findings on exam.  Discussed supportive care and treatment plan.  If symptoms are not getting better or if the patient feels like symptoms are getting worse, recommend they go for further evaluation with their primary care doctor or ER.  Patient (or parent/caregiver) agrees with and verbalizes understanding of the plan of care.   my ameya, polyp

## 2022-11-22 NOTE — BRIEF OPERATIVE NOTE - OPERATION/FINDINGS
Prone. Perianal block. Retractor inserted. 2cm anal polyp excised.  Hemostasis ensured. Fibrillar in anal canal

## 2022-11-22 NOTE — DISCHARGE NOTE NURSING/CASE MANAGEMENT/SOCIAL WORK - NSDCPEFALRISK_GEN_ALL_CORE
For information on Fall & Injury Prevention, visit: [email or web address] OR  [email or web address] OR  https://www.cdc.gov/steadi/patient.html

## 2022-11-22 NOTE — ASU DISCHARGE PLAN (ADULT/PEDIATRIC) - CALL YOUR DOCTOR IF YOU HAVE ANY OF THE FOLLOWING:
Bleeding that does not stop/Pain not relieved by Medications/Numbness, tingling, color or temperature change to extremity/Unable to urinate/Excessive diarrhea/Inability to tolerate liquids or foods/Increased irritability or sluggishness

## 2022-11-22 NOTE — PRE-ANESTHESIA EVALUATION ADULT - NSANTHADDINFOFT_GEN_ALL_CORE
Medical clearance / evaluation Dr. MARGARETH Love and Cardiology clearance / evaluation read and appreciated. Medical clearance / evaluation Dr. MARGARETH Love and Cardiology clearance / evaluation Dr. Smith read and appreciated.

## 2022-11-22 NOTE — ASU DISCHARGE PLAN (ADULT/PEDIATRIC) - CARE PROVIDER_API CALL
Vitaliy Pollard)  ColonRectal Surgery; Surgery  [address], [address]  [place] [address]  Phone: [phone]  Fax: [phone]  Follow Up Time: 1 month

## 2022-11-22 NOTE — DISCHARGE NOTE NURSING/CASE MANAGEMENT/SOCIAL WORK - PATIENT PORTAL LINK FT
You can access the FollowMyHealth Patient Portal offered by [facility] by registering at the following website: [email or web address]. By joining [employer]’s FollowMyHealth portal, you will also be able to view your health information using other applications (apps) compatible with our system.

## 2022-11-28 LAB — SURGICAL PATHOLOGY STUDY: SIGNIFICANT CHANGE UP

## 2022-11-29 ENCOUNTER — APPOINTMENT (OUTPATIENT)
Dept: NEPHROLOGY | Facility: CLINIC | Age: 87
End: 2022-11-29

## 2022-11-29 VITALS
HEIGHT: 64 IN | BODY MASS INDEX: 24.41 KG/M2 | TEMPERATURE: 97.4 F | DIASTOLIC BLOOD PRESSURE: 53 MMHG | HEART RATE: 52 BPM | OXYGEN SATURATION: 98 % | SYSTOLIC BLOOD PRESSURE: 126 MMHG | WEIGHT: 143 LBS

## 2022-11-29 PROBLEM — N18.9 CHRONIC KIDNEY DISEASE, UNSPECIFIED: Chronic | Status: ACTIVE | Noted: 2022-11-09

## 2022-11-29 PROCEDURE — 99214 OFFICE O/P EST MOD 30 MIN: CPT

## 2022-11-29 RX ORDER — FOLIC ACID/VIT B COMPLEX AND C 0.8 MG
0.8 TABLET ORAL
Qty: 90 | Refills: 3 | Status: ACTIVE | OUTPATIENT
Start: 2022-11-29

## 2022-11-29 RX ORDER — ALENDRONATE SODIUM 35 MG/1
35 TABLET ORAL
Refills: 0 | Status: DISCONTINUED | COMMUNITY
End: 2022-11-29

## 2022-11-29 NOTE — HISTORY OF PRESENT ILLNESS
[FreeTextEntry1] : STANLEY STRELISKER is a 89 year male with a history HTN, HLD, CAD, secondary hyperparathyroidism here for f/u CKD 4 & HTN. \par Decided not to take alendronate\par He is doing well\par Had polyp removed by Procaccino\par \par  +bleeding from bottom

## 2022-11-29 NOTE — ASSESSMENT
[FreeTextEntry1] : CKD 4: Creatinine trend reviewed with patient, Cr stable\par Conservative management of kidney disease\par HTN: BP @ goal on current antihypertensive medication regimen\par Volume status: stable on Torsemide 5 x weekly\par Proteinuria: Alb:Creatinine ratio stable\par Metabolic Acidosis : trend\par Anemia Management :  stable Hb\par Secondary hyperparathyroidism: calcitriol 2 x weekly and check PTH next labs in 4 months\par In order to slow CKD progression, the patient was educated on the importance of blood pressure control, healthy low Na diet,  and to avoid NSAIDs. \par Osteoporosis: he is not taking bisphos\par Healthy transitions program with [facility]\par Follow-up in 4 months\par Labs prior at  medical \par Dialyvite prescription given to pt for [facility] fill\par

## 2022-11-29 NOTE — PHYSICAL EXAM
[General Appearance - Alert] : alert [Sclera] : the sclera and conjunctiva were normal [Outer Ear] : the ears and nose were normal in appearance [FreeTextEntry1] : hearing aides [Neck Appearance] : the appearance of the neck was normal [Auscultation Breath Sounds / Voice Sounds] : lungs were clear to auscultation bilaterally [Heart Rate And Rhythm] : heart rate was normal and rhythm regular [Heart Sounds] : normal S1 and S2 [Heart Sounds Pericardial Friction Rub] : no pericardial rub [Edema] : there was no peripheral edema [Bowel Sounds] : normal bowel sounds [Abdomen Soft] : soft [Involuntary Movements] : no involuntary movements were seen [Skin Color & Pigmentation] : normal skin color and pigmentation [] : no rash [No Focal Deficits] : no focal deficits [Oriented To Time, Place, And Person] : oriented to person, place, and time

## 2022-11-29 NOTE — REVIEW OF SYSTEMS
[Loss Of Hearing] : hearing loss [Chest Pain] : no chest pain [Lower Ext Edema] : no extremity edema [Shortness Of Breath] : no shortness of breath [Cough] : no cough [SOB on Exertion] : no shortness of breath during exertion [Constipation] : constipation [As Noted in HPI] : as noted in HPI [Negative] : Heme/Lymph [FreeTextEntry3] : wears glasses [FreeTextEntry4] : hearing aides in place

## 2022-12-07 ENCOUNTER — APPOINTMENT (OUTPATIENT)
Dept: COLORECTAL SURGERY | Facility: CLINIC | Age: 87
End: 2022-12-07

## 2022-12-07 PROCEDURE — 99024 POSTOP FOLLOW-UP VISIT: CPT

## 2022-12-09 ENCOUNTER — APPOINTMENT (OUTPATIENT)
Dept: GASTROENTEROLOGY | Facility: CLINIC | Age: 87
End: 2022-12-09

## 2022-12-09 VITALS
OXYGEN SATURATION: 99 % | BODY MASS INDEX: 24.28 KG/M2 | HEART RATE: 53 BPM | SYSTOLIC BLOOD PRESSURE: 140 MMHG | DIASTOLIC BLOOD PRESSURE: 60 MMHG | HEIGHT: 64 IN | TEMPERATURE: 97.1 F | WEIGHT: 142.2 LBS

## 2022-12-09 DIAGNOSIS — Z87.19 PERSONAL HISTORY OF OTHER DISEASES OF THE DIGESTIVE SYSTEM: ICD-10-CM

## 2022-12-09 DIAGNOSIS — K59.09 OTHER CONSTIPATION: ICD-10-CM

## 2022-12-09 DIAGNOSIS — Z87.19 OTHER SPECIFIED POSTPROCEDURAL STATES: ICD-10-CM

## 2022-12-09 DIAGNOSIS — Z98.890 OTHER SPECIFIED POSTPROCEDURAL STATES: ICD-10-CM

## 2022-12-09 DIAGNOSIS — K59.00 CONSTIPATION, UNSPECIFIED: ICD-10-CM

## 2022-12-09 PROCEDURE — 99204 OFFICE O/P NEW MOD 45 MIN: CPT

## 2022-12-09 RX ORDER — POLYETHYLENE GLYCOL 3350 17 G/17G
17 POWDER, FOR SOLUTION ORAL
Qty: 1 | Refills: 0 | Status: ACTIVE | COMMUNITY
Start: 2022-12-09 | End: 1900-01-01

## 2022-12-09 NOTE — PHYSICAL EXAM
[Alert] : alert [Normal Voice/Communication] : normal voice/communication [Healthy Appearing] : healthy appearing [No Acute Distress] : no acute distress [Sclera] : the sclera and conjunctiva were normal [Hearing Threshold Finger Rub Not Imperial] : hearing was normal [Normal Appearance] : the appearance of the neck was normal [No Respiratory Distress] : no respiratory distress [No Acc Muscle Use] : no accessory muscle use [Respiration, Rhythm And Depth] : normal respiratory rhythm and effort [None] : no edema [Normal Sphincter Tone] : normal sphincter tone [No External Hemorrhoid] : no external hemorrhoids [No Rectal Mass] : no rectal mass [Cervical Lymph Nodes Enlarged Posterior Bilaterally] : no posterior cervical lymphadenopathy [No CVA Tenderness] : no CVA  tenderness [No Spinal Tenderness] : no spinal tenderness [Abnormal Walk] : normal gait [Normal Color / Pigmentation] : normal skin color and pigmentation [No Focal Deficits] : no focal deficits [Oriented To Time, Place, And Person] : oriented to person, place, and time [de-identified] : Elderly pleasant gentleman, no acute distress

## 2022-12-09 NOTE — REASON FOR VISIT
[Initial Evaluation] : an initial evaluation [Spouse] : spouse [FreeTextEntry1] : Chronic constipation, rectal polyp removed by colorectal surgeon

## 2022-12-09 NOTE — HISTORY OF PRESENT ILLNESS
[FreeTextEntry1] : Dr. Love takes care this pleasant 89-year-old gentleman here with his wife\par \par Followed by colorectal surgeon with removal of dysplastic polyp with apparently clean margins\par \par Chronic constipation\par \par No blood per rectum\par \par No family history of colon cancer\par \par No abdominal pain or cramping\par \par No reported family history of colorectal cancers

## 2022-12-09 NOTE — ASSESSMENT
[FreeTextEntry1] : Impression\par \par Chronic constipation in a 89-year-old gentleman with chronic renal insufficiency and cardiac issues\par \par Rectal polyp removed by colorectal surgeon\par \par Suggest\par \par We had a lengthy conversation\par \par At 89 with renal issues and cardiac issues it would seem prudent to avoid invasive testing\par \par We agree no traditional colonoscopy\par \par Even hold off on CT virtual colonoscopy at this time\par \par Simply try healthy high-fiber diet, reviewed in detail with patient and wife\par \par Trial of MiraLAX in the morning\par \par Dulcolax if needed at night\par \par Follow-up with nephrologist\par \par Follow-up with colorectal surgeon\par \par Call or follow-up with me at any time

## 2022-12-14 NOTE — HISTORY OF PRESENT ILLNESS
[FreeTextEntry1] : 89 year old man who presents for follow up after having undergone a transanal excision of a recurrent polyp on 11/22/2022. 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 He has mild soilage of his undergarment, but has no other changes in health and no other subjective complaints.

## 2022-12-14 NOTE — ASSESSMENT
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 He is recovering well without signs of infection on examination. I would like to see him back in the office for another examination in mid January.

## 2022-12-14 NOTE — PHYSICAL EXAM
[Respiratory Effort] : normal respiratory effort [Normal Rate and Rhythm] : normal rate and rhythm [de-identified] : Well appearing [FreeTextEntry1] : Rectal examination: the patient was placed in the left lateral decubitus position. Examination of the anus reveals no signs of inflammation or infection.

## 2023-01-06 ENCOUNTER — APPOINTMENT (OUTPATIENT)
Dept: COLORECTAL SURGERY | Facility: CLINIC | Age: 88
End: 2023-01-06
Payer: MEDICARE

## 2023-01-06 PROCEDURE — 99024 POSTOP FOLLOW-UP VISIT: CPT

## 2023-01-09 NOTE — PHYSICAL EXAM
[Respiratory Effort] : normal respiratory effort [Normal Rate and Rhythm] : normal rate and rhythm [JVD] : no jugular venous distention  [de-identified] : Well appearing elderly gentleman [FreeTextEntry1] : Anorectal examination: \par \par Inspection reveals no abnormality. JESSICA reveals no masses. Anoscopy reveals well healing mucosa in the anterior anal canal at the site of the polypectomy.

## 2023-01-09 NOTE — ASSESSMENT
[FreeTextEntry1] : 89 year old man who underwent a transanal excision of recurrent dysplastic polyp of the anal canal on 11/22/2022. \par \par He is recuperating nicely without subjective complaints. Anoscopy reveals a well healed surgical scar.\par \par I would like to see him again in the office in 3-4 months for surveillance.

## 2023-02-06 ENCOUNTER — RX RENEWAL (OUTPATIENT)
Age: 88
End: 2023-02-06

## 2023-03-03 ENCOUNTER — NON-APPOINTMENT (OUTPATIENT)
Age: 88
End: 2023-03-03

## 2023-04-14 ENCOUNTER — APPOINTMENT (OUTPATIENT)
Dept: COLORECTAL SURGERY | Facility: CLINIC | Age: 88
End: 2023-04-14
Payer: MEDICARE

## 2023-04-14 PROCEDURE — 99212 OFFICE O/P EST SF 10 MIN: CPT | Mod: 25

## 2023-04-14 PROCEDURE — 45300 PROCTOSIGMOIDOSCOPY DX: CPT

## 2023-04-14 NOTE — HISTORY OF PRESENT ILLNESS
[FreeTextEntry1] : Pleasant 89-year-old gentleman who presents for routine follow-up visit.\par \par Twice I have transanally excised a dysplastic distal rectal polyp from this individual.  He currently denies rectal bleeding or difficulty with bowel movements.  He is simply here to rule out local recurrence.\par \par The patient was examined in the left lateral decubitus position.  An anoscope was introduced and the distal rectum and anal canal carefully inspected.  The scar from the transanal site was identified and there is no evidence of local recurrence.\par \par I will see him in 6 months for repeat examination

## 2023-04-18 LAB
25(OH)D3 SERPL-MCNC: 27.2 NG/ML
ALBUMIN SERPL ELPH-MCNC: 4.1 G/DL
ALP BLD-CCNC: 72 U/L
ALT SERPL-CCNC: 16 U/L
ANION GAP SERPL CALC-SCNC: 11 MMOL/L
AST SERPL-CCNC: 22 U/L
BASOPHILS # BLD AUTO: 0.04 K/UL
BASOPHILS NFR BLD AUTO: 0.8 %
BILIRUB SERPL-MCNC: 0.3 MG/DL
BUN SERPL-MCNC: 60 MG/DL
CALCIUM SERPL-MCNC: 10.4 MG/DL
CALCIUM SERPL-MCNC: 10.4 MG/DL
CHLORIDE SERPL-SCNC: 104 MMOL/L
CHOLEST SERPL-MCNC: 108 MG/DL
CO2 SERPL-SCNC: 23 MMOL/L
CREAT SERPL-MCNC: 2.76 MG/DL
CREAT SPEC-SCNC: 24 MG/DL
EGFR: 21 ML/MIN/1.73M2
EOSINOPHIL # BLD AUTO: 0.55 K/UL
EOSINOPHIL NFR BLD AUTO: 10.8 %
ESTIMATED AVERAGE GLUCOSE: 88 MG/DL
FERRITIN SERPL-MCNC: 186 NG/ML
GLUCOSE SERPL-MCNC: 95 MG/DL
HBA1C MFR BLD HPLC: 4.7 %
HCT VFR BLD CALC: 35.9 %
HDLC SERPL-MCNC: 52 MG/DL
HGB BLD-MCNC: 11.5 G/DL
IMM GRANULOCYTES NFR BLD AUTO: 0.2 %
IRON SATN MFR SERPL: 26 %
IRON SERPL-MCNC: 59 UG/DL
LDLC SERPL CALC-MCNC: 42 MG/DL
LYMPHOCYTES # BLD AUTO: 0.87 K/UL
LYMPHOCYTES NFR BLD AUTO: 17 %
MAGNESIUM SERPL-MCNC: 2.6 MG/DL
MAN DIFF?: NORMAL
MCHC RBC-ENTMCNC: 30.4 PG
MCHC RBC-ENTMCNC: 32 GM/DL
MCV RBC AUTO: 95 FL
MICROALBUMIN 24H UR DL<=1MG/L-MCNC: 19 MG/DL
MICROALBUMIN/CREAT 24H UR-RTO: 776 MG/G
MONOCYTES # BLD AUTO: 0.58 K/UL
MONOCYTES NFR BLD AUTO: 11.4 %
NEUTROPHILS # BLD AUTO: 3.06 K/UL
NEUTROPHILS NFR BLD AUTO: 59.8 %
NONHDLC SERPL-MCNC: 56 MG/DL
PARATHYROID HORMONE INTACT: 124 PG/ML
PHOSPHATE SERPL-MCNC: 3.9 MG/DL
PLATELET # BLD AUTO: 152 K/UL
POTASSIUM SERPL-SCNC: 4.2 MMOL/L
PROT SERPL-MCNC: 6.1 G/DL
RBC # BLD: 3.78 M/UL
RBC # FLD: 14.3 %
SODIUM SERPL-SCNC: 138 MMOL/L
TIBC SERPL-MCNC: 230 UG/DL
TRIGL SERPL-MCNC: 71 MG/DL
UIBC SERPL-MCNC: 172 UG/DL
URATE SERPL-MCNC: 6.3 MG/DL
WBC # FLD AUTO: 5.11 K/UL

## 2023-04-20 ENCOUNTER — APPOINTMENT (OUTPATIENT)
Dept: NEPHROLOGY | Facility: CLINIC | Age: 88
End: 2023-04-20
Payer: MEDICARE

## 2023-04-20 VITALS
HEIGHT: 64 IN | HEART RATE: 58 BPM | TEMPERATURE: 97.8 F | DIASTOLIC BLOOD PRESSURE: 55 MMHG | BODY MASS INDEX: 24.24 KG/M2 | WEIGHT: 142 LBS | SYSTOLIC BLOOD PRESSURE: 180 MMHG

## 2023-04-20 PROCEDURE — 99214 OFFICE O/P EST MOD 30 MIN: CPT

## 2023-04-20 NOTE — ASSESSMENT
[FreeTextEntry1] : CKD 4: Creatinine trend reviewed with patient, Cr higher than baseline - repeat renal next week at PMD\par Conservative management of kidney disease\par HTN: BP suboptimal. Would have him take his hydralazine 100mg TID for now again \par Volume status: stable on Torsemide 5 x weekly\par Proteinuria: Alb:Creatinine ratio stable\par Metabolic Acidosis : stable\par Anemia Management :  stable Hb\par Secondary hyperparathyroidism: decrease calcitriol to 1 time weekly and check PTH next labs in 4 months\par Osteoporosis: he is not taking bisphos\par Healthy transitions program with [facility]\par Follow-up in 4 months\par Spoke to Dr Smith his cardiologist

## 2023-04-20 NOTE — PHYSICAL EXAM
[General Appearance - Alert] : alert [Sclera] : the sclera and conjunctiva were normal [FreeTextEntry1] : hearing aides [Outer Ear] : the ears and nose were normal in appearance [Neck Appearance] : the appearance of the neck was normal [Auscultation Breath Sounds / Voice Sounds] : lungs were clear to auscultation bilaterally [Heart Rate And Rhythm] : heart rate was normal and rhythm regular [Heart Sounds] : normal S1 and S2 [Heart Sounds Pericardial Friction Rub] : no pericardial rub [Edema] : there was no peripheral edema [Bowel Sounds] : normal bowel sounds [Abdomen Soft] : soft [Involuntary Movements] : no involuntary movements were seen [Skin Color & Pigmentation] : normal skin color and pigmentation [No Focal Deficits] : no focal deficits [] : no rash [Oriented To Time, Place, And Person] : oriented to person, place, and time

## 2023-04-20 NOTE — HISTORY OF PRESENT ILLNESS
[FreeTextEntry1] : STANLEY STRELISKER is a 89 year male with a history HTN, HLD, CAD, secondary hyperparathyroidism here for f/u CKD 4 & HTN. \par He had bronchitis and was hospitalized\par Then he had covid with minimal symptoms\par \par He saw Dr Smith earlier in week and norvasc was cut to 5mg and hydralazine was decreased to 100 BID\par Today BP in office high\par

## 2023-04-27 ENCOUNTER — APPOINTMENT (OUTPATIENT)
Dept: INTERNAL MEDICINE | Facility: CLINIC | Age: 88
End: 2023-04-27
Payer: MEDICARE

## 2023-04-27 ENCOUNTER — LABORATORY RESULT (OUTPATIENT)
Age: 88
End: 2023-04-27

## 2023-04-27 VITALS
HEIGHT: 64 IN | WEIGHT: 148 LBS | OXYGEN SATURATION: 98 % | BODY MASS INDEX: 25.27 KG/M2 | RESPIRATION RATE: 14 BRPM | HEART RATE: 54 BPM | DIASTOLIC BLOOD PRESSURE: 58 MMHG | TEMPERATURE: 97.6 F | SYSTOLIC BLOOD PRESSURE: 124 MMHG

## 2023-04-27 DIAGNOSIS — Z00.00 ENCOUNTER FOR GENERAL ADULT MEDICAL EXAMINATION W/OUT ABNORMAL FINDINGS: ICD-10-CM

## 2023-04-27 DIAGNOSIS — C44.319 BASAL CELL CARCINOMA OF SKIN OF OTHER PARTS OF FACE: ICD-10-CM

## 2023-04-27 DIAGNOSIS — M81.0 AGE-RELATED OSTEOPOROSIS W/OUT CURRENT PATHOLOGICAL FRACTURE: ICD-10-CM

## 2023-04-27 PROCEDURE — G0439: CPT

## 2023-04-27 RX ORDER — DENOSUMAB 60 MG/ML
INJECTION SUBCUTANEOUS
Refills: 0 | Status: DISCONTINUED | COMMUNITY
End: 2023-04-27

## 2023-04-27 NOTE — PHYSICAL EXAM
[No Acute Distress] : no acute distress [Well Nourished] : well nourished [Well Developed] : well developed [Well-Appearing] : well-appearing [Normal Sclera/Conjunctiva] : normal sclera/conjunctiva [EOMI] : extraocular movements intact [Normal Outer Ear/Nose] : the outer ears and nose were normal in appearance [Normal Oropharynx] : the oropharynx was normal [Normal TMs] : both tympanic membranes were normal [No Lymphadenopathy] : no lymphadenopathy [Supple] : supple [No Respiratory Distress] : no respiratory distress  [No Accessory Muscle Use] : no accessory muscle use [Clear to Auscultation] : lungs were clear to auscultation bilaterally [Normal Rate] : normal rate  [Regular Rhythm] : with a regular rhythm [Normal S1, S2] : normal S1 and S2 [Soft] : abdomen soft [Non Tender] : non-tender [Normal Bowel Sounds] : normal bowel sounds [Normal Gait] : normal gait [Alert and Oriented x3] : oriented to person, place, and time [de-identified] : varicose veins bilaterally. Very prominent on left calf, thigh.   [de-identified] : lipoma on left upper back

## 2023-04-27 NOTE — HISTORY OF PRESENT ILLNESS
[FreeTextEntry1] : Annual Physical [de-identified] : STANLEY STRELISKER is a 89 yo man with hypertension, hypercholesterolemia, CRI, hearing loss, skin cancer, lipoma on back here for a physical.  He has been well overall.  Recently saw cardiologist Dr Smith.   Hydralazine and amlodipine were decreased, BP was high so it re adjusted.  Denies chest pain or sob. Has had varicose veins for years, worse on left thigh and calf.\par \par Hypertension, hypercholesterolemia stable on current meds.  Sees Dr RADHA Tolbert for CRI, derm Dr santos.  Has seen endo dr santoyo for thyroid nodules and osteoporosis.  Would have no difficulty walking 4 blocks.\par \par The patient is  with 3 children, 7 grandchildren and 4 great grandchildren.  He used to manufacture toilet seats.  Seen with wife today.  Recovered from covid 19 last month.  Seen with wife Angie today.\par

## 2023-04-27 NOTE — HEALTH RISK ASSESSMENT
[Good] : ~his/her~  mood as  good [No] : In the past 12 months have you used drugs other than those required for medical reasons? No [No falls in past year] : Patient reported no falls in the past year [None] : None [With Family] : lives with family [Retired] : retired [] :  [Feels Safe at Home] : Feels safe at home [Fully functional (bathing, dressing, toileting, transferring, walking, feeding)] : Fully functional (bathing, dressing, toileting, transferring, walking, feeding) [Fully functional (using the telephone, shopping, preparing meals, housekeeping, doing laundry, using] : Fully functional and needs no help or supervision to perform IADLs (using the telephone, shopping, preparing meals, housekeeping, doing laundry, using transportation, managing medications and managing finances) [Reports changes in hearing] : Reports no changes in hearing [Reports changes in vision] : Reports no changes in vision [Reports changes in dental health] : Reports no changes in dental health [Smoke Detector] : smoke detector [Carbon Monoxide Detector] : carbon monoxide detector [Seat Belt] :  uses seat belt [Never] : Never

## 2023-04-27 NOTE — ASSESSMENT
[FreeTextEntry1] : HCM\par Labs pending\par continue current meds\par Pt seen and case discussed with Angie today\par BP check in 4 -6 months

## 2023-05-01 LAB
25(OH)D3 SERPL-MCNC: 26.1 NG/ML
ALBUMIN SERPL ELPH-MCNC: 4 G/DL
ALP BLD-CCNC: 73 U/L
ALT SERPL-CCNC: 13 U/L
ANION GAP SERPL CALC-SCNC: 12 MMOL/L
APPEARANCE: CLEAR
AST SERPL-CCNC: 25 U/L
BASOPHILS # BLD AUTO: 0.06 K/UL
BASOPHILS NFR BLD AUTO: 1.2 %
BILIRUB SERPL-MCNC: 0.4 MG/DL
BILIRUBIN URINE: NEGATIVE
BLOOD URINE: NEGATIVE
BUN SERPL-MCNC: 58 MG/DL
CALCIUM SERPL-MCNC: 10.5 MG/DL
CHLORIDE SERPL-SCNC: 104 MMOL/L
CHOLEST SERPL-MCNC: 101 MG/DL
CO2 SERPL-SCNC: 22 MMOL/L
COLOR: YELLOW
CREAT SERPL-MCNC: 2.94 MG/DL
EGFR: 19 ML/MIN/1.73M2
EOSINOPHIL # BLD AUTO: 0.32 K/UL
EOSINOPHIL NFR BLD AUTO: 6.2 %
ESTIMATED AVERAGE GLUCOSE: 91 MG/DL
GLUCOSE QUALITATIVE U: NEGATIVE MG/DL
GLUCOSE SERPL-MCNC: 88 MG/DL
HBA1C MFR BLD HPLC: 4.8 %
HCT VFR BLD CALC: 34.8 %
HDLC SERPL-MCNC: 48 MG/DL
HGB BLD-MCNC: 11.4 G/DL
IMM GRANULOCYTES NFR BLD AUTO: 0.4 %
KETONES URINE: NEGATIVE MG/DL
LDLC SERPL CALC-MCNC: 39 MG/DL
LEUKOCYTE ESTERASE URINE: NEGATIVE
LYMPHOCYTES # BLD AUTO: 0.86 K/UL
LYMPHOCYTES NFR BLD AUTO: 16.7 %
MAN DIFF?: NORMAL
MCHC RBC-ENTMCNC: 31.6 PG
MCHC RBC-ENTMCNC: 32.8 GM/DL
MCV RBC AUTO: 96.4 FL
MONOCYTES # BLD AUTO: 0.57 K/UL
MONOCYTES NFR BLD AUTO: 11.1 %
NEUTROPHILS # BLD AUTO: 3.31 K/UL
NEUTROPHILS NFR BLD AUTO: 64.4 %
NITRITE URINE: NEGATIVE
NONHDLC SERPL-MCNC: 53 MG/DL
PH URINE: 6.5
PLATELET # BLD AUTO: 151 K/UL
POTASSIUM SERPL-SCNC: 4 MMOL/L
PROT SERPL-MCNC: 6.2 G/DL
PROTEIN URINE: 30 MG/DL
PSA SERPL-MCNC: 0.66 NG/ML
RBC # BLD: 3.61 M/UL
RBC # FLD: 13.9 %
SODIUM SERPL-SCNC: 138 MMOL/L
SPECIFIC GRAVITY URINE: 1.01
T4 FREE SERPL-MCNC: 1.4 NG/DL
TRIGL SERPL-MCNC: 70 MG/DL
TSH SERPL-ACNC: 3 UIU/ML
URATE SERPL-MCNC: 6 MG/DL
UROBILINOGEN URINE: 0.2 MG/DL
VIT B12 SERPL-MCNC: 1231 PG/ML
WBC # FLD AUTO: 5.14 K/UL

## 2023-05-30 NOTE — PRE-ANESTHESIA EVALUATION ADULT - NSANTHNECKRD_ENT_A_CORE
Apply mupirocin three times a day while the sore is open. Once healed, can use hydrocortisone 2.5% ointment as needed for itchiness. Apply aquaphor on top as barrier cream.  
No

## 2023-08-01 ENCOUNTER — RX RENEWAL (OUTPATIENT)
Age: 88
End: 2023-08-01

## 2023-08-28 ENCOUNTER — NON-APPOINTMENT (OUTPATIENT)
Age: 88
End: 2023-08-28

## 2023-08-28 LAB
25(OH)D3 SERPL-MCNC: 28.4 NG/ML
ALBUMIN SERPL ELPH-MCNC: 4 G/DL
ALP BLD-CCNC: 78 U/L
ALT SERPL-CCNC: 12 U/L
ANION GAP SERPL CALC-SCNC: 12 MMOL/L
AST SERPL-CCNC: 17 U/L
BILIRUB SERPL-MCNC: 0.3 MG/DL
BUN SERPL-MCNC: 46 MG/DL
CALCIUM SERPL-MCNC: 10.1 MG/DL
CALCIUM SERPL-MCNC: 10.1 MG/DL
CHLORIDE SERPL-SCNC: 107 MMOL/L
CO2 SERPL-SCNC: 21 MMOL/L
CREAT SERPL-MCNC: 2.18 MG/DL
CREAT SPEC-SCNC: 16 MG/DL
EGFR: 28 ML/MIN/1.73M2
GLUCOSE SERPL-MCNC: 96 MG/DL
MAGNESIUM SERPL-MCNC: 2.6 MG/DL
MICROALBUMIN 24H UR DL<=1MG/L-MCNC: 21.6 MG/DL
MICROALBUMIN/CREAT 24H UR-RTO: 1381 MG/G
PARATHYROID HORMONE INTACT: 135 PG/ML
PHOSPHATE SERPL-MCNC: 3.7 MG/DL
POTASSIUM SERPL-SCNC: 4.3 MMOL/L
PROT SERPL-MCNC: 5.8 G/DL
SODIUM SERPL-SCNC: 139 MMOL/L
URATE SERPL-MCNC: 5.8 MG/DL

## 2023-09-05 ENCOUNTER — APPOINTMENT (OUTPATIENT)
Dept: NEPHROLOGY | Facility: CLINIC | Age: 88
End: 2023-09-05
Payer: MEDICARE

## 2023-09-05 VITALS
SYSTOLIC BLOOD PRESSURE: 157 MMHG | TEMPERATURE: 97.7 F | WEIGHT: 141.6 LBS | HEART RATE: 51 BPM | BODY MASS INDEX: 24.17 KG/M2 | OXYGEN SATURATION: 98 % | DIASTOLIC BLOOD PRESSURE: 54 MMHG | HEIGHT: 64 IN

## 2023-09-05 PROCEDURE — 99214 OFFICE O/P EST MOD 30 MIN: CPT

## 2023-09-05 NOTE — REVIEW OF SYSTEMS
[Loss Of Hearing] : hearing loss [Chest Pain] : no chest pain [Lower Ext Edema] : no extremity edema [Constipation] : constipation [Negative] : Heme/Lymph [FreeTextEntry3] : wears glasses [FreeTextEntry4] : hearing aides in place

## 2023-09-05 NOTE — PHYSICAL EXAM
[General Appearance - Alert] : alert [Sclera] : the sclera and conjunctiva were normal [Outer Ear] : the ears and nose were normal in appearance [Neck Appearance] : the appearance of the neck was normal [Auscultation Breath Sounds / Voice Sounds] : lungs were clear to auscultation bilaterally [Heart Rate And Rhythm] : heart rate was normal and rhythm regular [Heart Sounds] : normal S1 and S2 [Heart Sounds Pericardial Friction Rub] : no pericardial rub [FreeTextEntry1] : trace edema  [Bowel Sounds] : normal bowel sounds [Abdomen Soft] : soft [Involuntary Movements] : no involuntary movements were seen [Skin Color & Pigmentation] : normal skin color and pigmentation [] : no rash [No Focal Deficits] : no focal deficits [Oriented To Time, Place, And Person] : oriented to person, place, and time

## 2023-09-05 NOTE — ASSESSMENT
[FreeTextEntry1] : CKD 4: Creatinine trend reviewed with patient, Back to baseline Cr 2.1 Conservative management of kidney disease HTN: BP suboptimal. Would have him take his hydralazine 100mg at noon  Volume status: stable on Torsemide 5 x week dosing Proteinuria: Alb:Creatinine ratio stable Metabolic Acidosis : stable Anemia Management :  stable Hb Secondary hyperparathyroidism: calcitriol to 2 time weekly  Osteoporosis: he is not taking bisphos Healthy transitions program with [facility] Follow-up in 6 months

## 2023-09-05 NOTE — HISTORY OF PRESENT ILLNESS
[FreeTextEntry1] : STANLEY STRELISKER is a 89 year male with a history HTN, HLD, CAD, secondary hyperparathyroidism here for f/u CKD 4 & HTN.  Doing well Has not taken his afternoon hydralazine yet Awaiting the birth of his twin great grandchildren

## 2023-09-27 ENCOUNTER — APPOINTMENT (OUTPATIENT)
Dept: INTERNAL MEDICINE | Facility: CLINIC | Age: 88
End: 2023-09-27
Payer: MEDICARE

## 2023-09-27 PROCEDURE — G0008: CPT

## 2023-09-27 PROCEDURE — 90662 IIV NO PRSV INCREASED AG IM: CPT

## 2023-09-27 RX ORDER — FOLIC ACID 1 MG/1
1 TABLET ORAL DAILY
Qty: 90 | Refills: 3 | Status: ACTIVE | COMMUNITY
Start: 2021-10-11 | End: 1900-01-01

## 2023-09-28 ENCOUNTER — RX RENEWAL (OUTPATIENT)
Age: 88
End: 2023-09-28

## 2023-10-13 ENCOUNTER — APPOINTMENT (OUTPATIENT)
Dept: COLORECTAL SURGERY | Facility: CLINIC | Age: 88
End: 2023-10-13
Payer: MEDICARE

## 2023-10-13 PROCEDURE — 99212 OFFICE O/P EST SF 10 MIN: CPT | Mod: 25

## 2023-10-13 PROCEDURE — 45300 PROCTOSIGMOIDOSCOPY DX: CPT

## 2023-10-23 ENCOUNTER — LABORATORY RESULT (OUTPATIENT)
Age: 88
End: 2023-10-23

## 2023-10-24 LAB
25(OH)D3 SERPL-MCNC: 20.7 NG/ML
ALBUMIN SERPL ELPH-MCNC: 4 G/DL
ALP BLD-CCNC: 72 U/L
ALT SERPL-CCNC: 12 U/L
ANION GAP SERPL CALC-SCNC: 13 MMOL/L
APPEARANCE: CLEAR
AST SERPL-CCNC: 18 U/L
BASOPHILS # BLD AUTO: 0.04 K/UL
BASOPHILS NFR BLD AUTO: 0.8 %
BILIRUB SERPL-MCNC: 0.4 MG/DL
BILIRUBIN URINE: NEGATIVE
BLOOD URINE: NEGATIVE
BUN SERPL-MCNC: 46 MG/DL
CALCIUM SERPL-MCNC: 9.9 MG/DL
CHLORIDE SERPL-SCNC: 106 MMOL/L
CHOLEST SERPL-MCNC: 101 MG/DL
CO2 SERPL-SCNC: 20 MMOL/L
COLOR: YELLOW
CREAT SERPL-MCNC: 2.45 MG/DL
EGFR: 24 ML/MIN/1.73M2
EOSINOPHIL # BLD AUTO: 0.21 K/UL
EOSINOPHIL NFR BLD AUTO: 4.2 %
ESTIMATED AVERAGE GLUCOSE: 88 MG/DL
GLUCOSE QUALITATIVE U: NEGATIVE MG/DL
GLUCOSE SERPL-MCNC: 97 MG/DL
HBA1C MFR BLD HPLC: 4.7 %
HCT VFR BLD CALC: 33.2 %
HDLC SERPL-MCNC: 56 MG/DL
HGB BLD-MCNC: 11 G/DL
IMM GRANULOCYTES NFR BLD AUTO: 0.4 %
KETONES URINE: NEGATIVE MG/DL
LDLC SERPL CALC-MCNC: 31 MG/DL
LEUKOCYTE ESTERASE URINE: NEGATIVE
LYMPHOCYTES # BLD AUTO: 0.75 K/UL
LYMPHOCYTES NFR BLD AUTO: 15.1 %
MAN DIFF?: NORMAL
MCHC RBC-ENTMCNC: 30.9 PG
MCHC RBC-ENTMCNC: 33.1 GM/DL
MCV RBC AUTO: 93.3 FL
MONOCYTES # BLD AUTO: 0.5 K/UL
MONOCYTES NFR BLD AUTO: 10 %
NEUTROPHILS # BLD AUTO: 3.46 K/UL
NEUTROPHILS NFR BLD AUTO: 69.5 %
NITRITE URINE: NEGATIVE
NONHDLC SERPL-MCNC: 45 MG/DL
PH URINE: 6.5
PLATELET # BLD AUTO: 132 K/UL
POTASSIUM SERPL-SCNC: 4.1 MMOL/L
PROT SERPL-MCNC: 5.9 G/DL
PROTEIN URINE: 30 MG/DL
RBC # BLD: 3.56 M/UL
RBC # FLD: 13.7 %
SODIUM SERPL-SCNC: 139 MMOL/L
SPECIFIC GRAVITY URINE: 1.01
T4 FREE SERPL-MCNC: 1.3 NG/DL
TRIGL SERPL-MCNC: 67 MG/DL
TSH SERPL-ACNC: 3.32 UIU/ML
UROBILINOGEN URINE: 0.2 MG/DL
VIT B12 SERPL-MCNC: 686 PG/ML
WBC # FLD AUTO: 4.98 K/UL

## 2023-10-24 NOTE — ASU DISCHARGE PLAN (ADULT/PEDIATRIC) - C. MAKE IMPORTANT PERSONAL OR BUSINESS DECISIONS
Phone Number Called: [phone] (home)       Call outcome: Spoke to patient regarding message below.    Message: Mother rescheduled upcoming appointment to 11/08/2023 because she is having a procedure. She needs a refill of methylphenidate (RITALIN LA) 10 MG SR capsule sent to [facility] [address] [place] [address].           Statement Selected

## 2023-10-26 ENCOUNTER — APPOINTMENT (OUTPATIENT)
Dept: INTERNAL MEDICINE | Facility: CLINIC | Age: 88
End: 2023-10-26
Payer: MEDICARE

## 2023-10-26 VITALS
TEMPERATURE: 97.2 F | WEIGHT: 146 LBS | BODY MASS INDEX: 24.92 KG/M2 | SYSTOLIC BLOOD PRESSURE: 146 MMHG | HEART RATE: 60 BPM | HEIGHT: 64 IN | OXYGEN SATURATION: 98 % | DIASTOLIC BLOOD PRESSURE: 72 MMHG

## 2023-10-26 VITALS — SYSTOLIC BLOOD PRESSURE: 138 MMHG | DIASTOLIC BLOOD PRESSURE: 72 MMHG

## 2023-10-26 DIAGNOSIS — N40.0 BENIGN PROSTATIC HYPERPLASIA WITHOUT LOWER URINARY TRACT SYMPMS: ICD-10-CM

## 2023-10-26 PROCEDURE — 99214 OFFICE O/P EST MOD 30 MIN: CPT | Mod: 25

## 2023-11-10 ENCOUNTER — OUTPATIENT (OUTPATIENT)
Dept: OUTPATIENT SERVICES | Facility: HOSPITAL | Age: 88
LOS: 1 days | End: 2023-11-10
Payer: MEDICARE

## 2023-11-10 ENCOUNTER — APPOINTMENT (OUTPATIENT)
Dept: RADIOLOGY | Facility: CLINIC | Age: 88
End: 2023-11-10
Payer: MEDICARE

## 2023-11-10 DIAGNOSIS — Z98.890 OTHER SPECIFIED POSTPROCEDURAL STATES: Chronic | ICD-10-CM

## 2023-11-10 DIAGNOSIS — Z00.00 ENCOUNTER FOR GENERAL ADULT MEDICAL EXAMINATION WITHOUT ABNORMAL FINDINGS: ICD-10-CM

## 2023-11-10 PROCEDURE — 71046 X-RAY EXAM CHEST 2 VIEWS: CPT | Mod: 26

## 2023-11-10 PROCEDURE — 71046 X-RAY EXAM CHEST 2 VIEWS: CPT

## 2023-11-16 ENCOUNTER — RX RENEWAL (OUTPATIENT)
Age: 88
End: 2023-11-16

## 2023-11-16 RX ORDER — ISOSORBIDE MONONITRATE 60 MG/1
60 TABLET, EXTENDED RELEASE ORAL DAILY
Qty: 180 | Refills: 0 | Status: ACTIVE | COMMUNITY
Start: 2021-03-16 | End: 1900-01-01

## 2024-01-04 RX ORDER — METOPROLOL SUCCINATE 25 MG/1
25 TABLET, EXTENDED RELEASE ORAL DAILY
Qty: 180 | Refills: 1 | Status: ACTIVE | COMMUNITY
Start: 2022-07-19 | End: 1900-01-01

## 2024-02-27 DIAGNOSIS — D63.1 CHRONIC KIDNEY DISEASE, STAGE 4 (SEVERE): ICD-10-CM

## 2024-02-27 DIAGNOSIS — N18.4 CHRONIC KIDNEY DISEASE, STAGE 4 (SEVERE): ICD-10-CM

## 2024-03-01 ENCOUNTER — LABORATORY RESULT (OUTPATIENT)
Age: 89
End: 2024-03-01

## 2024-03-04 LAB
25(OH)D3 SERPL-MCNC: 24 NG/ML
ALBUMIN SERPL ELPH-MCNC: 4 G/DL
ANION GAP SERPL CALC-SCNC: 12 MMOL/L
APPEARANCE: CLEAR
BASOPHILS # BLD AUTO: 0.05 K/UL
BASOPHILS NFR BLD AUTO: 1 %
BILIRUBIN URINE: NEGATIVE
BLOOD URINE: NEGATIVE
BUN SERPL-MCNC: 54 MG/DL
CALCIUM SERPL-MCNC: 9.6 MG/DL
CHLORIDE SERPL-SCNC: 105 MMOL/L
CO2 SERPL-SCNC: 20 MMOL/L
COLOR: YELLOW
CREAT SERPL-MCNC: 2.75 MG/DL
EGFR: 21 ML/MIN/1.73M2
EOSINOPHIL # BLD AUTO: 0.16 K/UL
EOSINOPHIL NFR BLD AUTO: 3 %
GLUCOSE QUALITATIVE U: NEGATIVE MG/DL
GLUCOSE SERPL-MCNC: 95 MG/DL
HCT VFR BLD CALC: 33.3 %
HGB BLD-MCNC: 11 G/DL
IMM GRANULOCYTES NFR BLD AUTO: 0.4 %
KETONES URINE: NEGATIVE MG/DL
LEUKOCYTE ESTERASE URINE: NEGATIVE
LYMPHOCYTES # BLD AUTO: 0.72 K/UL
LYMPHOCYTES NFR BLD AUTO: 13.7 %
MAN DIFF?: NORMAL
MCHC RBC-ENTMCNC: 31 PG
MCHC RBC-ENTMCNC: 33 GM/DL
MCV RBC AUTO: 93.8 FL
MONOCYTES # BLD AUTO: 0.63 K/UL
MONOCYTES NFR BLD AUTO: 12 %
NEUTROPHILS # BLD AUTO: 3.67 K/UL
NEUTROPHILS NFR BLD AUTO: 69.9 %
NITRITE URINE: NEGATIVE
PH URINE: 6.5
PHOSPHATE SERPL-MCNC: 3.4 MG/DL
PLATELET # BLD AUTO: 151 K/UL
POTASSIUM SERPL-SCNC: 4.3 MMOL/L
PROTEIN URINE: 100 MG/DL
RBC # BLD: 3.55 M/UL
RBC # FLD: 13.9 %
SODIUM SERPL-SCNC: 137 MMOL/L
SPECIFIC GRAVITY URINE: 1.01
UROBILINOGEN URINE: 0.2 MG/DL
WBC # FLD AUTO: 5.25 K/UL

## 2024-03-05 ENCOUNTER — APPOINTMENT (OUTPATIENT)
Dept: NEPHROLOGY | Facility: CLINIC | Age: 89
End: 2024-03-05
Payer: MEDICARE

## 2024-03-05 VITALS
DIASTOLIC BLOOD PRESSURE: 57 MMHG | BODY MASS INDEX: 25.37 KG/M2 | HEART RATE: 53 BPM | TEMPERATURE: 97.3 F | OXYGEN SATURATION: 97 % | WEIGHT: 143.2 LBS | SYSTOLIC BLOOD PRESSURE: 164 MMHG | HEIGHT: 63 IN

## 2024-03-05 DIAGNOSIS — D63.1 CHRONIC KIDNEY DISEASE, UNSPECIFIED: ICD-10-CM

## 2024-03-05 DIAGNOSIS — N18.9 CHRONIC KIDNEY DISEASE, UNSPECIFIED: ICD-10-CM

## 2024-03-05 DIAGNOSIS — R80.9 PROTEINURIA, UNSPECIFIED: ICD-10-CM

## 2024-03-05 PROCEDURE — G2211 COMPLEX E/M VISIT ADD ON: CPT

## 2024-03-05 PROCEDURE — 99214 OFFICE O/P EST MOD 30 MIN: CPT

## 2024-03-05 NOTE — PHYSICAL EXAM
[General Appearance - Alert] : alert [Sclera] : the sclera and conjunctiva were normal [Outer Ear] : the ears and nose were normal in appearance [Neck Appearance] : the appearance of the neck was normal [Auscultation Breath Sounds / Voice Sounds] : lungs were clear to auscultation bilaterally [Heart Sounds] : normal S1 and S2 [Heart Rate And Rhythm] : heart rate was normal and rhythm regular [FreeTextEntry1] : 1-2+edema  [Heart Sounds Pericardial Friction Rub] : no pericardial rub [Bowel Sounds] : normal bowel sounds [Abdomen Soft] : soft [Involuntary Movements] : no involuntary movements were seen [Skin Color & Pigmentation] : normal skin color and pigmentation [No Focal Deficits] : no focal deficits [] : no rash [Oriented To Time, Place, And Person] : oriented to person, place, and time

## 2024-03-05 NOTE — HISTORY OF PRESENT ILLNESS
[FreeTextEntry1] : STANLEY STRELISKER is a 89 year male with a history HTN, HLD, CAD, secondary hyperparathyroidism here for f/u CKD 4 & HTN.  Doing well- had dietary indiscretions. Has more edema of lower ext Here with wife  Was in [place] for 1 month and had a great time visiting family Twin great grandchildren born in Oct as well

## 2024-03-05 NOTE — REVIEW OF SYSTEMS
[Loss Of Hearing] : hearing loss [Lower Ext Edema] : lower extremity edema [Constipation] : constipation [Negative] : Heme/Lymph [FreeTextEntry5] : edema [FreeTextEntry4] : hearing aides in place

## 2024-03-05 NOTE — ASSESSMENT
[FreeTextEntry1] : CKD 4: Creatinine trend reviewed with patient, Higher than baseline- likely from dietary indiscretion. Now back on his usual diet; less red meat Conservative management of kidney disease HTN: BP suboptimal. More edema Volume status more volume overloaded- increase torsemide 10mg daily for this week and trend wts and edema Proteinuria:  trend Metabolic Acidosis : stable Anemia Management :  stable Hb Secondary hyperparathyroidism: calcitriol to 2 time weekly  Osteoporosis: he is not taking bisphos Healthy transitions program with [facility] Follow-up in 4 months

## 2024-03-14 ENCOUNTER — APPOINTMENT (OUTPATIENT)
Dept: INTERNAL MEDICINE | Facility: CLINIC | Age: 89
End: 2024-03-14
Payer: MEDICARE

## 2024-03-14 VITALS
BODY MASS INDEX: 24.7 KG/M2 | DIASTOLIC BLOOD PRESSURE: 64 MMHG | SYSTOLIC BLOOD PRESSURE: 136 MMHG | HEIGHT: 63 IN | HEART RATE: 57 BPM | OXYGEN SATURATION: 98 % | TEMPERATURE: 98.3 F | WEIGHT: 139.38 LBS

## 2024-03-14 DIAGNOSIS — M54.9 DORSALGIA, UNSPECIFIED: ICD-10-CM

## 2024-03-14 PROCEDURE — 99214 OFFICE O/P EST MOD 30 MIN: CPT

## 2024-03-14 PROCEDURE — G2211 COMPLEX E/M VISIT ADD ON: CPT

## 2024-03-14 NOTE — PHYSICAL EXAM
[No Acute Distress] : no acute distress [Well Nourished] : well nourished [Well Developed] : well developed [Well-Appearing] : well-appearing [No Lymphadenopathy] : no lymphadenopathy [Supple] : supple [No Accessory Muscle Use] : no accessory muscle use [No Respiratory Distress] : no respiratory distress  [Clear to Auscultation] : lungs were clear to auscultation bilaterally [Normal Rate] : normal rate  [Regular Rhythm] : with a regular rhythm [Normal S1, S2] : normal S1 and S2 [No Edema] : there was no peripheral edema [No CVA Tenderness] : no CVA  tenderness [No Spinal Tenderness] : no spinal tenderness [de-identified] : varicose veins bilaterally. Very prominent on left calf, thigh.   [de-identified] : lipoma on left upper back [de-identified] : walks with cane [de-identified] : alert

## 2024-03-14 NOTE — HISTORY OF PRESENT ILLNESS
[FreeTextEntry1] : left leg pain [de-identified] : STANLEY STRELISKER is a 89 yo man with hypertension, hypercholesterolemia, CRI, hearing loss, skin cancer, lipoma on back here left leg pain for the past 1 -2 weeks.  Has improved overall.  Walking with a cane.  He has been well overall.    Hypertension, hypercholesterolemia stable on current meds.  Sees Dr RADHA Tolbert for CRI, derm Dr santos.  Has seen endo dr santoyo for thyroid nodules and osteoporosis.    The patient is  with 3 children, 7 grandchildren and 7 great grandchildren.  He used to manufacture toilet seats.  Seen with wife Angie today.

## 2024-03-22 ENCOUNTER — RESULT REVIEW (OUTPATIENT)
Age: 89
End: 2024-03-22

## 2024-03-22 ENCOUNTER — APPOINTMENT (OUTPATIENT)
Dept: INTERNAL MEDICINE | Facility: CLINIC | Age: 89
End: 2024-03-22
Payer: MEDICARE

## 2024-03-22 ENCOUNTER — APPOINTMENT (OUTPATIENT)
Dept: INTERNAL MEDICINE | Facility: CLINIC | Age: 89
End: 2024-03-22

## 2024-03-22 DIAGNOSIS — I10 ESSENTIAL (PRIMARY) HYPERTENSION: ICD-10-CM

## 2024-03-22 DIAGNOSIS — E78.5 HYPERLIPIDEMIA, UNSPECIFIED: ICD-10-CM

## 2024-03-22 DIAGNOSIS — N18.4 CHRONIC KIDNEY DISEASE, STAGE 4 (SEVERE): ICD-10-CM

## 2024-03-22 DIAGNOSIS — R63.4 ABNORMAL WEIGHT LOSS: ICD-10-CM

## 2024-03-22 PROCEDURE — 99214 OFFICE O/P EST MOD 30 MIN: CPT

## 2024-03-22 PROCEDURE — G2211 COMPLEX E/M VISIT ADD ON: CPT

## 2024-03-22 RX ORDER — METHYLPREDNISOLONE 4 MG/1
4 TABLET ORAL
Qty: 1 | Refills: 0 | Status: DISCONTINUED | COMMUNITY
Start: 2024-03-14 | End: 2024-03-22

## 2024-03-22 NOTE — REVIEW OF SYSTEMS
[Vision Problems] : no vision problems [Fever] : no fever [Nasal Discharge] : no nasal discharge [Shortness Of Breath] : no shortness of breath [Chest Pain] : no chest pain

## 2024-03-22 NOTE — HISTORY OF PRESENT ILLNESS
[FreeTextEntry1] : weight loss [de-identified] : STANLEY STRELISKER is a 89 yo man with hypertension, hypercholesterolemia, CRI, hearing loss, skin cancer, lipoma on back here for a fuv to discuss weight loss for the past 18 months, approx 10 pounds.  Appetite is good.  No headache, chest pain, sob, abdominal pain.  Calorie intake the same.  He has been well overall.  Back and leg pain improved  Hypertension, hypercholesterolemia stable on current meds.  Sees Dr RADHA Tolbert for CRI, derm Dr santos.  Has seen endo dr santoyo for thyroid nodules and osteoporosis.    The patient is  with 3 children, 7 grandchildren and 7 great grandchildren.  He used to manufacture toilet seats.  Seen with wife Angie today.

## 2024-03-22 NOTE — PHYSICAL EXAM
[No Acute Distress] : no acute distress [Well Nourished] : well nourished [Well Developed] : well developed [Well-Appearing] : well-appearing [No Lymphadenopathy] : no lymphadenopathy [Supple] : supple [No Respiratory Distress] : no respiratory distress  [No Accessory Muscle Use] : no accessory muscle use [Clear to Auscultation] : lungs were clear to auscultation bilaterally [Normal Rate] : normal rate  [Regular Rhythm] : with a regular rhythm [No Edema] : there was no peripheral edema [Normal S1, S2] : normal S1 and S2 [No CVA Tenderness] : no CVA  tenderness [No Spinal Tenderness] : no spinal tenderness [Normal Gait] : normal gait [de-identified] : varicose veins bilaterally. Very prominent on left calf, thigh.   [de-identified] : alert [de-identified] : lipoma on left upper back

## 2024-03-26 ENCOUNTER — OUTPATIENT (OUTPATIENT)
Dept: OUTPATIENT SERVICES | Facility: HOSPITAL | Age: 89
LOS: 1 days | End: 2024-03-26
Payer: MEDICARE

## 2024-03-26 ENCOUNTER — APPOINTMENT (OUTPATIENT)
Dept: CT IMAGING | Facility: CLINIC | Age: 89
End: 2024-03-26
Payer: MEDICARE

## 2024-03-26 DIAGNOSIS — R63.4 ABNORMAL WEIGHT LOSS: ICD-10-CM

## 2024-03-26 DIAGNOSIS — Z98.890 OTHER SPECIFIED POSTPROCEDURAL STATES: Chronic | ICD-10-CM

## 2024-03-26 PROCEDURE — 74176 CT ABD & PELVIS W/O CONTRAST: CPT | Mod: 26,MH

## 2024-03-26 PROCEDURE — 71250 CT THORAX DX C-: CPT | Mod: 26,MH

## 2024-03-26 PROCEDURE — 71250 CT THORAX DX C-: CPT

## 2024-03-26 PROCEDURE — 74176 CT ABD & PELVIS W/O CONTRAST: CPT

## 2024-03-29 RX ORDER — DOXAZOSIN 2 MG/1
2 TABLET ORAL
Qty: 90 | Refills: 1 | Status: ACTIVE | COMMUNITY
Start: 2021-04-28 | End: 1900-01-01

## 2024-04-02 ENCOUNTER — OUTPATIENT (OUTPATIENT)
Dept: OUTPATIENT SERVICES | Facility: HOSPITAL | Age: 89
LOS: 1 days | End: 2024-04-02
Payer: MEDICARE

## 2024-04-02 ENCOUNTER — APPOINTMENT (OUTPATIENT)
Dept: ULTRASOUND IMAGING | Facility: CLINIC | Age: 89
End: 2024-04-02
Payer: MEDICARE

## 2024-04-02 DIAGNOSIS — Z98.890 OTHER SPECIFIED POSTPROCEDURAL STATES: Chronic | ICD-10-CM

## 2024-04-02 DIAGNOSIS — Z00.00 ENCOUNTER FOR GENERAL ADULT MEDICAL EXAMINATION WITHOUT ABNORMAL FINDINGS: ICD-10-CM

## 2024-04-02 PROCEDURE — 76536 US EXAM OF HEAD AND NECK: CPT

## 2024-04-02 PROCEDURE — 76536 US EXAM OF HEAD AND NECK: CPT | Mod: 26

## 2024-04-30 ENCOUNTER — RX RENEWAL (OUTPATIENT)
Age: 89
End: 2024-04-30

## 2024-05-06 ENCOUNTER — NON-APPOINTMENT (OUTPATIENT)
Age: 89
End: 2024-05-06

## 2024-05-06 LAB
ALBUMIN SERPL ELPH-MCNC: 3.7 G/DL
ANION GAP SERPL CALC-SCNC: 14 MMOL/L
BUN SERPL-MCNC: 42 MG/DL
CALCIUM SERPL-MCNC: 9.5 MG/DL
CHLORIDE SERPL-SCNC: 104 MMOL/L
CO2 SERPL-SCNC: 19 MMOL/L
CREAT SERPL-MCNC: 2.63 MG/DL
EGFR: 22 ML/MIN/1.73M2
GLUCOSE SERPL-MCNC: 117 MG/DL
PHOSPHATE SERPL-MCNC: 3.4 MG/DL
POTASSIUM SERPL-SCNC: 4.4 MMOL/L
SODIUM SERPL-SCNC: 137 MMOL/L

## 2024-06-27 ENCOUNTER — RX RENEWAL (OUTPATIENT)
Age: 89
End: 2024-06-27

## 2024-07-19 ENCOUNTER — LABORATORY RESULT (OUTPATIENT)
Age: 89
End: 2024-07-19

## 2024-07-19 ENCOUNTER — NON-APPOINTMENT (OUTPATIENT)
Age: 89
End: 2024-07-19

## 2024-07-19 LAB
BASOPHILS # BLD AUTO: 0.05 K/UL
BASOPHILS NFR BLD AUTO: 0.9 %
EOSINOPHIL # BLD AUTO: 0.26 K/UL
EOSINOPHIL NFR BLD AUTO: 4.9 %
HCT VFR BLD CALC: 33.9 %
HGB BLD-MCNC: 11.5 G/DL
IMM GRANULOCYTES NFR BLD AUTO: 0.2 %
LYMPHOCYTES # BLD AUTO: 0.81 K/UL
LYMPHOCYTES NFR BLD AUTO: 15.3 %
MAN DIFF?: NORMAL
MCHC RBC-ENTMCNC: 31 PG
MCHC RBC-ENTMCNC: 33.9 GM/DL
MCV RBC AUTO: 91.4 FL
MONOCYTES # BLD AUTO: 0.6 K/UL
MONOCYTES NFR BLD AUTO: 11.3 %
NEUTROPHILS # BLD AUTO: 3.57 K/UL
NEUTROPHILS NFR BLD AUTO: 67.4 %
PLATELET # BLD AUTO: 145 K/UL
RBC # BLD: 3.71 M/UL
RBC # FLD: 13 %
WBC # FLD AUTO: 5.3 K/UL

## 2024-07-22 ENCOUNTER — NON-APPOINTMENT (OUTPATIENT)
Age: 89
End: 2024-07-22

## 2024-07-22 LAB
25(OH)D3 SERPL-MCNC: 22.9 NG/ML
ALBUMIN SERPL ELPH-MCNC: 3.8 G/DL
ANION GAP SERPL CALC-SCNC: 13 MMOL/L
APPEARANCE: CLEAR
BILIRUBIN URINE: NEGATIVE
BLOOD URINE: NEGATIVE
BUN SERPL-MCNC: 47 MG/DL
CALCIUM SERPL-MCNC: 9.5 MG/DL
CHLORIDE SERPL-SCNC: 106 MMOL/L
CO2 SERPL-SCNC: 19 MMOL/L
COLOR: YELLOW
CREAT SERPL-MCNC: 2.69 MG/DL
CREAT SPEC-SCNC: 56 MG/DL
CREAT/PROT UR: 4.6 RATIO
EGFR: 21 ML/MIN/1.73M2
GLUCOSE QUALITATIVE U: NEGATIVE MG/DL
GLUCOSE SERPL-MCNC: 100 MG/DL
KETONES URINE: NEGATIVE MG/DL
LEUKOCYTE ESTERASE URINE: NEGATIVE
NITRITE URINE: NEGATIVE
PH URINE: 6.5
PHOSPHATE SERPL-MCNC: 3.7 MG/DL
POTASSIUM SERPL-SCNC: 4.4 MMOL/L
PROT UR-MCNC: 255 MG/DL
PROTEIN URINE: 300 MG/DL
SODIUM SERPL-SCNC: 137 MMOL/L
SPECIFIC GRAVITY URINE: 1.02
UROBILINOGEN URINE: 0.2 MG/DL

## 2024-07-23 ENCOUNTER — APPOINTMENT (OUTPATIENT)
Dept: NEPHROLOGY | Facility: CLINIC | Age: 89
End: 2024-07-23
Payer: MEDICARE

## 2024-07-23 VITALS
BODY MASS INDEX: 24.8 KG/M2 | HEIGHT: 63 IN | DIASTOLIC BLOOD PRESSURE: 60 MMHG | HEART RATE: 58 BPM | WEIGHT: 140 LBS | SYSTOLIC BLOOD PRESSURE: 136 MMHG

## 2024-07-23 DIAGNOSIS — N18.4 CHRONIC KIDNEY DISEASE, STAGE 4 (SEVERE): ICD-10-CM

## 2024-07-23 DIAGNOSIS — R80.9 PROTEINURIA, UNSPECIFIED: ICD-10-CM

## 2024-07-23 DIAGNOSIS — E87.20 ACIDOSIS, UNSPECIFIED: ICD-10-CM

## 2024-07-23 DIAGNOSIS — I10 ESSENTIAL (PRIMARY) HYPERTENSION: ICD-10-CM

## 2024-07-23 PROCEDURE — 99213 OFFICE O/P EST LOW 20 MIN: CPT

## 2024-07-23 PROCEDURE — G2211 COMPLEX E/M VISIT ADD ON: CPT

## 2024-07-23 NOTE — REVIEW OF SYSTEMS
[Loss Of Hearing] : hearing loss [Lower Ext Edema] : lower extremity edema [Negative] : Heme/Lymph [FreeTextEntry4] : hearing aides in place [FreeTextEntry5] : chronic edema

## 2024-07-23 NOTE — ASSESSMENT
[FreeTextEntry1] : CKD 4: Creatinine trend reviewed with patient, Stable CKD Conservative management of kidney disease HTN: BP improved Volume status stable- take torsemide 10mg 5 times a week and extra doses for wts and edema Proteinuria:  trend Metabolic Acidosis: stable Anemia Management:  stable Hb Secondary hyperparathyroidism: calcitriol to 3 time weekly  Osteoporosis: he is not taking bisphos Healthy transitions program with [facility] Follow-up in 4 months

## 2024-07-23 NOTE — HISTORY OF PRESENT ILLNESS
[FreeTextEntry1] : STANLEY STRELISKER is a 89 year male with a history HTN, HLD, CAD, secondary hyperparathyroidism here for f/u CKD 4 & HTN.  Doing well Here with wife

## 2024-07-23 NOTE — PHYSICAL EXAM
[General Appearance - Alert] : alert [Sclera] : the sclera and conjunctiva were normal [Outer Ear] : the ears and nose were normal in appearance [Neck Appearance] : the appearance of the neck was normal [Auscultation Breath Sounds / Voice Sounds] : lungs were clear to auscultation bilaterally [Heart Rate And Rhythm] : heart rate was normal and rhythm regular [Heart Sounds] : normal S1 and S2 [Heart Sounds Pericardial Friction Rub] : no pericardial rub [FreeTextEntry1] : 1+edema  [Bowel Sounds] : normal bowel sounds [Abdomen Soft] : soft [Involuntary Movements] : no involuntary movements were seen [Skin Color & Pigmentation] : normal skin color and pigmentation [] : no rash [No Focal Deficits] : no focal deficits [Oriented To Time, Place, And Person] : oriented to person, place, and time

## 2024-07-26 PROBLEM — E87.20 METABOLIC ACIDOSIS: Status: ACTIVE | Noted: 2024-07-26

## 2024-07-26 RX ORDER — SODIUM BICARBONATE 650 MG/1
650 TABLET ORAL
Qty: 90 | Refills: 3 | Status: ACTIVE | COMMUNITY
Start: 2024-07-26 | End: 1900-01-01

## 2024-08-27 ENCOUNTER — RESULT REVIEW (OUTPATIENT)
Age: 89
End: 2024-08-27

## 2024-08-27 ENCOUNTER — APPOINTMENT (OUTPATIENT)
Dept: RADIOLOGY | Facility: HOSPITAL | Age: 89
End: 2024-08-27

## 2024-08-27 ENCOUNTER — APPOINTMENT (OUTPATIENT)
Dept: INTERNAL MEDICINE | Facility: CLINIC | Age: 89
End: 2024-08-27
Payer: MEDICARE

## 2024-08-27 ENCOUNTER — OUTPATIENT (OUTPATIENT)
Dept: OUTPATIENT SERVICES | Facility: HOSPITAL | Age: 89
LOS: 1 days | End: 2024-08-27
Payer: MEDICARE

## 2024-08-27 ENCOUNTER — APPOINTMENT (OUTPATIENT)
Dept: RADIOLOGY | Facility: HOSPITAL | Age: 89
End: 2024-08-27
Payer: MEDICARE

## 2024-08-27 VITALS
TEMPERATURE: 98.4 F | HEART RATE: 55 BPM | DIASTOLIC BLOOD PRESSURE: 60 MMHG | WEIGHT: 140 LBS | OXYGEN SATURATION: 97 % | HEIGHT: 63 IN | BODY MASS INDEX: 24.8 KG/M2 | SYSTOLIC BLOOD PRESSURE: 150 MMHG

## 2024-08-27 DIAGNOSIS — M25.561 PAIN IN RIGHT KNEE: ICD-10-CM
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
## 2024-08-27 PROCEDURE — 73562 X-RAY EXAM OF KNEE 3: CPT | Mod: 26,RT

## 2024-08-27 PROCEDURE — 73562 X-RAY EXAM OF KNEE 3: CPT

## 2024-08-27 PROCEDURE — 99214 OFFICE O/P EST MOD 30 MIN: CPT

## 2024-08-27 PROCEDURE — 73502 X-RAY EXAM HIP UNI 2-3 VIEWS: CPT

## 2024-08-27 PROCEDURE — 73502 X-RAY EXAM HIP UNI 2-3 VIEWS: CPT | Mod: 26,RT

## 2024-08-27 NOTE — PHYSICAL EXAM
[Normal] : normal gait, coordination grossly intact, no focal deficits and deep tendon reflexes were 2+ and symmetric [de-identified] : Full ROM at R hip.  No internal/external rotation.  No hip tendeness.  Full ROM at L hip.  Mild tenderness medial R knee with internal rotation of the knee. [de-identified] : +L periorbital hematoma. [de-identified] : 3cm scalp laceration occipital region, R sided.  Closed/dry. [de-identified] : No finger-nose dysmetria.  Pupils equal, round, reactive to light.

## 2024-08-27 NOTE — ASSESSMENT
[FreeTextEntry1] : Patient s/p fall at home.  He hit his head, but has an intact Neuro exam, and no syncope.  He is not on blood thinners.  We can hold off on CT of the head, but patient or spouse are to alert me to any new symptomatology (dizziness, lightheadedness, new Neuro symptoms such as weakness or visual changes).  Patient's R hip has full ROM.  He remains somewhat concerned and will send for imaging.  The R knee has full ROM, although there is tenderness over the R medial knee with rotation.  Will send for knee films, and I advised patient to see Orthopedics for this.  He has seen Dr. Jackson and Dr. Rivera in the past.  He says he will see Dr. Robbi Parker (at [facility]) for this, and I asked him to inform the radiology facility to have the film results forwarded to Dr. Parker.  Patient does not feel he needs pain medication.  Patient's Tdap is UTD from 2019.

## 2024-08-27 NOTE — HISTORY OF PRESENT ILLNESS
[Spouse] : spouse [FreeTextEntry8] : This past Saturday 8/24/2024, patient was picking up a plant, and he tried to put the plant onto a walker.  He misjudged the distance.  He fell to his right side.  He had his wallet in his back R pocket.    He fell onto the R hip, and the occipital region of the head. the right hand.  He had abrasions on the R hand, the scalp.  He later developed a periorbital hematoma on the L with a "golf ball" over the L temple, which is now improved.  No symptoms of dizziness, palpitations, chest pain, dyspnea.  No syncope.  Spouse says he was awake the whole time.  He currently has no pain.  He did not seek medical attention after this fall.  He does not have pain over the R hip, but there is some pain over the medial R knee.

## 2024-08-28 ENCOUNTER — NON-APPOINTMENT (OUTPATIENT)
Age: 89
End: 2024-08-28

## 2024-09-04 NOTE — ASU PATIENT PROFILE, ADULT - FALL HARM RISK TYPE OF ASSESSMENT
[de-identified] : This patient has left hip osteoarthritis. The patient is not an appropriate candidate for left total hip arthroplasty at this time  as he has not yet tried a course of conservative management. An extensive discussion was conducted on the natural history of the disease and the variety of surgical and non-surgical options available to the patient including, but not limited to non-steroidal anti-inflammatory medications, steroid injections, physical therapy, maintenance of ideal body weight, and reduction of activity. I prescribed a left hip intraarticular cortisone injection, image guided to be performed by interventional radiology. Mobic and PT rx. The patient will schedule an appointment as needed. 
Admission

## 2024-09-06 ENCOUNTER — RX RENEWAL (OUTPATIENT)
Age: 89
End: 2024-09-06

## 2024-09-06 ENCOUNTER — APPOINTMENT (OUTPATIENT)
Dept: INTERNAL MEDICINE | Facility: CLINIC | Age: 89
End: 2024-09-06
Payer: MEDICARE

## 2024-09-06 VITALS — SYSTOLIC BLOOD PRESSURE: 142 MMHG | DIASTOLIC BLOOD PRESSURE: 78 MMHG

## 2024-09-06 VITALS
OXYGEN SATURATION: 98 % | SYSTOLIC BLOOD PRESSURE: 160 MMHG | WEIGHT: 139 LBS | BODY MASS INDEX: 24.63 KG/M2 | TEMPERATURE: 97.7 F | DIASTOLIC BLOOD PRESSURE: 64 MMHG | HEART RATE: 53 BPM | HEIGHT: 63 IN
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
## 2024-09-06 DIAGNOSIS — E78.5 HYPERLIPIDEMIA, UNSPECIFIED: ICD-10-CM

## 2024-09-06 DIAGNOSIS — I10 ESSENTIAL (PRIMARY) HYPERTENSION: ICD-10-CM

## 2024-09-06 DIAGNOSIS — C44.319 BASAL CELL CARCINOMA OF SKIN OF OTHER PARTS OF FACE: ICD-10-CM

## 2024-09-06 DIAGNOSIS — N18.4 CHRONIC KIDNEY DISEASE, STAGE 4 (SEVERE): ICD-10-CM
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
## 2024-09-06 PROCEDURE — G2211 COMPLEX E/M VISIT ADD ON: CPT

## 2024-09-06 PROCEDURE — 99214 OFFICE O/P EST MOD 30 MIN: CPT

## 2024-09-06 NOTE — HISTORY OF PRESENT ILLNESS
[FreeTextEntry1] : fall, hospitalization at [facility] [de-identified] : STANLEY STRELISKER is a 89 yo man with hypertension, hypercholesterolemia, CRI, hearing loss, skin cancer, lipoma on back here for a fuv to discuss recent hospitalization at [facility].  Pt was admitted in stable condition.  Felt well.  Labs, ct brain unremarkable.  Will be seeing cardio dr wright this week.  No headache, chest pain, sob, abdominal pain.  He has been well overall.    Hypertension, hypercholesterolemia stable on current meds.  Sees Dr RADHA Tolbert for CRI, derm Dr santos.  Has seen endo dr santoyo for thyroid nodules and osteoporosis.    The patient is  with 3 children, 7 grandchildren and 7 great grandchildren.  He used to manufacture toilet seats.  Seen with wife Angie today.

## 2024-09-06 NOTE — PHYSICAL EXAM
[No Acute Distress] : no acute distress [Well Nourished] : well nourished [Well Developed] : well developed [Well-Appearing] : well-appearing [No Lymphadenopathy] : no lymphadenopathy [Supple] : supple [No Respiratory Distress] : no respiratory distress  [No Accessory Muscle Use] : no accessory muscle use [Clear to Auscultation] : lungs were clear to auscultation bilaterally [Normal Rate] : normal rate  [Regular Rhythm] : with a regular rhythm [Normal S1, S2] : normal S1 and S2 [No Edema] : there was no peripheral edema [Soft] : abdomen soft [No CVA Tenderness] : no CVA  tenderness [No Spinal Tenderness] : no spinal tenderness [de-identified] : varicose veins bilaterally. Very prominent on left calf, thigh.   [de-identified] : walks with cane [de-identified] : alert Information: Selecting Yes will display possible errors in your note based on the variables you have selected. This validation is only offered as a suggestion for you. PLEASE NOTE THAT THE VALIDATION TEXT WILL BE REMOVED WHEN YOU FINALIZE YOUR NOTE. IF YOU WANT TO FAX A PRELIMINARY NOTE YOU WILL NEED TO TOGGLE THIS TO 'NO' IF YOU DO NOT WANT IT IN YOUR FAXED NOTE.

## 2024-10-02 ENCOUNTER — NON-APPOINTMENT (OUTPATIENT)
Age: 89
End: 2024-10-02

## 2024-10-03 ENCOUNTER — APPOINTMENT (OUTPATIENT)
Dept: INTERNAL MEDICINE | Facility: CLINIC | Age: 89
End: 2024-10-03
Payer: MEDICARE

## 2024-10-03 ENCOUNTER — OUTPATIENT (OUTPATIENT)
Dept: OUTPATIENT SERVICES | Facility: HOSPITAL | Age: 89
LOS: 1 days | End: 2024-10-03
Payer: MEDICARE

## 2024-10-03 ENCOUNTER — APPOINTMENT (OUTPATIENT)
Dept: RADIOLOGY | Facility: HOSPITAL | Age: 89
End: 2024-10-03
Payer: MEDICARE

## 2024-10-03 DIAGNOSIS — E78.5 HYPERLIPIDEMIA, UNSPECIFIED: ICD-10-CM

## 2024-10-03 DIAGNOSIS — I12.9 HYPERTENSIVE CHRONIC KIDNEY DISEASE WITH STAGE 1 THROUGH STAGE 4 CHRONIC KIDNEY DISEASE, OR UNSPECIFIED CHRONIC KIDNEY DISEASE: ICD-10-CM

## 2024-10-03 DIAGNOSIS — R05.8 OTHER SPECIFIED COUGH: ICD-10-CM

## 2024-10-03 DIAGNOSIS — Z98.890 OTHER SPECIFIED POSTPROCEDURAL STATES: Chronic | ICD-10-CM

## 2024-10-03 DIAGNOSIS — Z00.00 ENCOUNTER FOR GENERAL ADULT MEDICAL EXAMINATION WITHOUT ABNORMAL FINDINGS: ICD-10-CM

## 2024-10-03 DIAGNOSIS — N18.4 CHRONIC KIDNEY DISEASE, STAGE 4 (SEVERE): ICD-10-CM

## 2024-10-03 PROCEDURE — 99442: CPT | Mod: 93

## 2024-10-03 PROCEDURE — 71046 X-RAY EXAM CHEST 2 VIEWS: CPT | Mod: 26

## 2024-10-09 ENCOUNTER — NON-APPOINTMENT (OUTPATIENT)
Age: 89
End: 2024-10-09

## 2024-10-11 ENCOUNTER — APPOINTMENT (OUTPATIENT)
Dept: COLORECTAL SURGERY | Facility: CLINIC | Age: 89
End: 2024-10-11
Payer: MEDICARE

## 2024-10-11 PROCEDURE — 46600 DIAGNOSTIC ANOSCOPY SPX: CPT

## 2024-10-14 ENCOUNTER — APPOINTMENT (OUTPATIENT)
Dept: INTERNAL MEDICINE | Facility: CLINIC | Age: 89
End: 2024-10-14
Payer: MEDICARE

## 2024-10-14 VITALS
OXYGEN SATURATION: 96 % | BODY MASS INDEX: 24.1 KG/M2 | HEART RATE: 58 BPM | WEIGHT: 136 LBS | HEIGHT: 63 IN | DIASTOLIC BLOOD PRESSURE: 85 MMHG | SYSTOLIC BLOOD PRESSURE: 150 MMHG | TEMPERATURE: 97.6 F | RESPIRATION RATE: 15 BRPM

## 2024-10-14 DIAGNOSIS — J06.9 ACUTE UPPER RESPIRATORY INFECTION, UNSPECIFIED: ICD-10-CM

## 2024-10-14 PROCEDURE — 99214 OFFICE O/P EST MOD 30 MIN: CPT

## 2024-10-14 RX ORDER — FLUTICASONE PROPIONATE 110 UG/1
110 AEROSOL, METERED RESPIRATORY (INHALATION)
Qty: 1 | Refills: 0 | Status: ACTIVE | COMMUNITY
Start: 2024-10-14 | End: 1900-01-01

## 2024-10-15 LAB
INFLUENZA A RESULT: NOT DETECTED
INFLUENZA B RESULT: NOT DETECTED
RESP SYN VIRUS RESULT: NOT DETECTED
SARS-COV-2 RESULT: NOT DETECTED

## 2024-10-28 ENCOUNTER — APPOINTMENT (OUTPATIENT)
Dept: INTERNAL MEDICINE | Facility: CLINIC | Age: 89
End: 2024-10-28

## 2024-11-11 ENCOUNTER — APPOINTMENT (OUTPATIENT)
Dept: INTERNAL MEDICINE | Facility: CLINIC | Age: 89
End: 2024-11-11
Payer: MEDICARE

## 2024-11-11 ENCOUNTER — APPOINTMENT (OUTPATIENT)
Dept: INTERNAL MEDICINE | Facility: CLINIC | Age: 89
End: 2024-11-11

## 2024-11-11 PROCEDURE — 90662 IIV NO PRSV INCREASED AG IM: CPT

## 2024-11-11 PROCEDURE — 36415 COLL VENOUS BLD VENIPUNCTURE: CPT

## 2024-11-11 PROCEDURE — G0008: CPT

## 2024-11-13 LAB
ALBUMIN SERPL ELPH-MCNC: 3.8 G/DL
ANION GAP SERPL CALC-SCNC: 11 MMOL/L
BASOPHILS # BLD AUTO: 0.05 K/UL
BASOPHILS NFR BLD AUTO: 1 %
BUN SERPL-MCNC: 47 MG/DL
CALCIUM SERPL-MCNC: 10 MG/DL
CHLORIDE SERPL-SCNC: 107 MMOL/L
CO2 SERPL-SCNC: 22 MMOL/L
CREAT SERPL-MCNC: 2.5 MG/DL
EGFR: 23 ML/MIN/1.73M2
EOSINOPHIL # BLD AUTO: 0.27 K/UL
EOSINOPHIL NFR BLD AUTO: 5.6 %
FERRITIN SERPL-MCNC: 129 NG/ML
GLUCOSE SERPL-MCNC: 90 MG/DL
HCT VFR BLD CALC: 33.7 %
HGB BLD-MCNC: 11 G/DL
IMM GRANULOCYTES NFR BLD AUTO: 0.4 %
IRON SERPL-MCNC: 85 UG/DL
LYMPHOCYTES # BLD AUTO: 0.63 K/UL
LYMPHOCYTES NFR BLD AUTO: 13 %
MAN DIFF?: NORMAL
MCHC RBC-ENTMCNC: 30.6 PG
MCHC RBC-ENTMCNC: 32.6 G/DL
MCV RBC AUTO: 93.9 FL
MONOCYTES # BLD AUTO: 0.54 K/UL
MONOCYTES NFR BLD AUTO: 11.2 %
NEUTROPHILS # BLD AUTO: 3.32 K/UL
NEUTROPHILS NFR BLD AUTO: 68.8 %
PHOSPHATE SERPL-MCNC: 3.5 MG/DL
PLATELET # BLD AUTO: 163 K/UL
POTASSIUM SERPL-SCNC: 4.4 MMOL/L
RBC # BLD: 3.59 M/UL
RBC # FLD: 13.8 %
SODIUM SERPL-SCNC: 140 MMOL/L
WBC # FLD AUTO: 4.83 K/UL

## 2024-11-15 ENCOUNTER — APPOINTMENT (OUTPATIENT)
Dept: INTERNAL MEDICINE | Facility: CLINIC | Age: 89
End: 2024-11-15
Payer: MEDICARE

## 2024-11-15 ENCOUNTER — NON-APPOINTMENT (OUTPATIENT)
Age: 89
End: 2024-11-15

## 2024-11-15 ENCOUNTER — APPOINTMENT (OUTPATIENT)
Dept: NEPHROLOGY | Facility: CLINIC | Age: 89
End: 2024-11-15
Payer: MEDICARE

## 2024-11-15 VITALS
HEART RATE: 56 BPM | TEMPERATURE: 97.8 F | HEIGHT: 63 IN | OXYGEN SATURATION: 98 % | SYSTOLIC BLOOD PRESSURE: 140 MMHG | WEIGHT: 141 LBS | BODY MASS INDEX: 24.98 KG/M2 | DIASTOLIC BLOOD PRESSURE: 60 MMHG

## 2024-11-15 VITALS
SYSTOLIC BLOOD PRESSURE: 154 MMHG | HEIGHT: 63 IN | HEART RATE: 50 BPM | TEMPERATURE: 97.8 F | OXYGEN SATURATION: 98 % | DIASTOLIC BLOOD PRESSURE: 62 MMHG | BODY MASS INDEX: 24.98 KG/M2 | WEIGHT: 141 LBS

## 2024-11-15 DIAGNOSIS — E78.5 HYPERLIPIDEMIA, UNSPECIFIED: ICD-10-CM

## 2024-11-15 DIAGNOSIS — C44.319 BASAL CELL CARCINOMA OF SKIN OF OTHER PARTS OF FACE: ICD-10-CM

## 2024-11-15 DIAGNOSIS — N18.4 CHRONIC KIDNEY DISEASE, STAGE 4 (SEVERE): ICD-10-CM

## 2024-11-15 DIAGNOSIS — Z00.00 ENCOUNTER FOR GENERAL ADULT MEDICAL EXAMINATION W/OUT ABNORMAL FINDINGS: ICD-10-CM

## 2024-11-15 DIAGNOSIS — I12.9 HYPERTENSIVE CHRONIC KIDNEY DISEASE WITH STAGE 1 THROUGH STAGE 4 CHRONIC KIDNEY DISEASE, OR UNSPECIFIED CHRONIC KIDNEY DISEASE: ICD-10-CM

## 2024-11-15 PROCEDURE — 99214 OFFICE O/P EST MOD 30 MIN: CPT

## 2024-11-15 PROCEDURE — G0439: CPT

## 2024-11-15 PROCEDURE — G2211 COMPLEX E/M VISIT ADD ON: CPT

## 2024-11-20 PROCEDURE — 71046 X-RAY EXAM CHEST 2 VIEWS: CPT

## 2025-01-01 ENCOUNTER — INPATIENT (INPATIENT)
Facility: HOSPITAL | Age: 89
LOS: 6 days | Discharge: NURSING, REHAB OR LONG-TERM CARE | DRG: 536 | End: 2025-02-19
Attending: ORTHOPAEDIC SURGERY | Admitting: ORTHOPAEDIC SURGERY
Payer: MEDICARE

## 2025-01-01 VITALS — HEART RATE: 60 BPM | DIASTOLIC BLOOD PRESSURE: 49 MMHG | SYSTOLIC BLOOD PRESSURE: 144 MMHG | OXYGEN SATURATION: 95 %

## 2025-01-01 VITALS
WEIGHT: 138.01 LBS | SYSTOLIC BLOOD PRESSURE: 185 MMHG | DIASTOLIC BLOOD PRESSURE: 77 MMHG | RESPIRATION RATE: 16 BRPM | TEMPERATURE: 98 F | OXYGEN SATURATION: 95 % | HEIGHT: 64 IN | HEART RATE: 66 BPM

## 2025-01-01 DIAGNOSIS — I10 ESSENTIAL (PRIMARY) HYPERTENSION: ICD-10-CM

## 2025-01-01 DIAGNOSIS — N17.9 ACUTE KIDNEY FAILURE, UNSPECIFIED: ICD-10-CM

## 2025-01-01 DIAGNOSIS — Z98.890 OTHER SPECIFIED POSTPROCEDURAL STATES: Chronic | ICD-10-CM

## 2025-01-01 DIAGNOSIS — H35.30 UNSPECIFIED MACULAR DEGENERATION: ICD-10-CM

## 2025-01-01 DIAGNOSIS — S72.002A FRACTURE OF UNSPECIFIED PART OF NECK OF LEFT FEMUR, INITIAL ENCOUNTER FOR CLOSED FRACTURE: ICD-10-CM

## 2025-01-01 DIAGNOSIS — R52 PAIN, UNSPECIFIED: ICD-10-CM

## 2025-01-01 DIAGNOSIS — N18.4 CHRONIC KIDNEY DISEASE, STAGE 4 (SEVERE): ICD-10-CM

## 2025-01-01 DIAGNOSIS — S72.142A DISPLACED INTERTROCHANTERIC FRACTURE OF LEFT FEMUR, INITIAL ENCOUNTER FOR CLOSED FRACTURE: ICD-10-CM

## 2025-01-01 DIAGNOSIS — D62 ACUTE POSTHEMORRHAGIC ANEMIA: ICD-10-CM

## 2025-01-01 LAB
ALBUMIN SERPL ELPH-MCNC: 2.9 G/DL — LOW (ref 3.3–5)
ALBUMIN SERPL ELPH-MCNC: 3.8 G/DL — SIGNIFICANT CHANGE UP (ref 3.3–5)
ALP SERPL-CCNC: 59 U/L — SIGNIFICANT CHANGE UP (ref 40–120)
ALP SERPL-CCNC: 78 U/L — SIGNIFICANT CHANGE UP (ref 40–120)
ALT FLD-CCNC: 15 U/L — SIGNIFICANT CHANGE UP (ref 10–45)
ALT FLD-CCNC: 6 U/L — LOW (ref 10–45)
ANION GAP SERPL CALC-SCNC: 11 MMOL/L — SIGNIFICANT CHANGE UP (ref 5–17)
ANION GAP SERPL CALC-SCNC: 12 MMOL/L — SIGNIFICANT CHANGE UP (ref 5–17)
ANION GAP SERPL CALC-SCNC: 12 MMOL/L — SIGNIFICANT CHANGE UP (ref 5–17)
ANION GAP SERPL CALC-SCNC: 13 MMOL/L — SIGNIFICANT CHANGE UP (ref 5–17)
ANION GAP SERPL CALC-SCNC: 13 MMOL/L — SIGNIFICANT CHANGE UP (ref 5–17)
ANION GAP SERPL CALC-SCNC: 14 MMOL/L — SIGNIFICANT CHANGE UP (ref 5–17)
ANION GAP SERPL CALC-SCNC: 14 MMOL/L — SIGNIFICANT CHANGE UP (ref 5–17)
APTT BLD: 28.7 SEC — SIGNIFICANT CHANGE UP (ref 24.5–35.6)
APTT BLD: 30.4 SEC — SIGNIFICANT CHANGE UP (ref 24.5–35.6)
AST SERPL-CCNC: 21 U/L — SIGNIFICANT CHANGE UP (ref 10–40)
AST SERPL-CCNC: 23 U/L — SIGNIFICANT CHANGE UP (ref 10–40)
BASOPHILS # BLD AUTO: 0.02 K/UL — SIGNIFICANT CHANGE UP (ref 0–0.2)
BASOPHILS # BLD AUTO: 0.04 K/UL — SIGNIFICANT CHANGE UP (ref 0–0.2)
BASOPHILS NFR BLD AUTO: 0.3 % — SIGNIFICANT CHANGE UP (ref 0–2)
BASOPHILS NFR BLD AUTO: 0.7 % — SIGNIFICANT CHANGE UP (ref 0–2)
BILIRUB SERPL-MCNC: 0.2 MG/DL — SIGNIFICANT CHANGE UP (ref 0.2–1.2)
BILIRUB SERPL-MCNC: 0.3 MG/DL — SIGNIFICANT CHANGE UP (ref 0.2–1.2)
BLD GP AB SCN SERPL QL: NEGATIVE — SIGNIFICANT CHANGE UP
BUN SERPL-MCNC: 56 MG/DL — HIGH (ref 7–23)
BUN SERPL-MCNC: 59 MG/DL — HIGH (ref 7–23)
BUN SERPL-MCNC: 60 MG/DL — HIGH (ref 7–23)
BUN SERPL-MCNC: 60 MG/DL — HIGH (ref 7–23)
BUN SERPL-MCNC: 63 MG/DL — HIGH (ref 7–23)
BUN SERPL-MCNC: 63 MG/DL — HIGH (ref 7–23)
BUN SERPL-MCNC: 69 MG/DL — HIGH (ref 7–23)
BUN SERPL-MCNC: 70 MG/DL — HIGH (ref 7–23)
BUN SERPL-MCNC: 72 MG/DL — HIGH (ref 7–23)
CALCIUM SERPL-MCNC: 8.7 MG/DL — SIGNIFICANT CHANGE UP (ref 8.4–10.5)
CALCIUM SERPL-MCNC: 9.2 MG/DL — SIGNIFICANT CHANGE UP (ref 8.4–10.5)
CALCIUM SERPL-MCNC: 9.3 MG/DL — SIGNIFICANT CHANGE UP (ref 8.4–10.5)
CALCIUM SERPL-MCNC: 9.4 MG/DL — SIGNIFICANT CHANGE UP (ref 8.4–10.5)
CALCIUM SERPL-MCNC: 9.4 MG/DL — SIGNIFICANT CHANGE UP (ref 8.4–10.5)
CALCIUM SERPL-MCNC: 9.6 MG/DL — SIGNIFICANT CHANGE UP (ref 8.4–10.5)
CALCIUM SERPL-MCNC: 9.9 MG/DL — SIGNIFICANT CHANGE UP (ref 8.4–10.5)
CHLORIDE SERPL-SCNC: 105 MMOL/L — SIGNIFICANT CHANGE UP (ref 96–108)
CHLORIDE SERPL-SCNC: 105 MMOL/L — SIGNIFICANT CHANGE UP (ref 96–108)
CHLORIDE SERPL-SCNC: 106 MMOL/L — SIGNIFICANT CHANGE UP (ref 96–108)
CHLORIDE SERPL-SCNC: 107 MMOL/L — SIGNIFICANT CHANGE UP (ref 96–108)
CHLORIDE SERPL-SCNC: 108 MMOL/L — SIGNIFICANT CHANGE UP (ref 96–108)
CHLORIDE SERPL-SCNC: 109 MMOL/L — HIGH (ref 96–108)
CO2 SERPL-SCNC: 16 MMOL/L — LOW (ref 22–31)
CO2 SERPL-SCNC: 17 MMOL/L — LOW (ref 22–31)
CO2 SERPL-SCNC: 18 MMOL/L — LOW (ref 22–31)
CO2 SERPL-SCNC: 18 MMOL/L — LOW (ref 22–31)
CO2 SERPL-SCNC: 19 MMOL/L — LOW (ref 22–31)
CO2 SERPL-SCNC: 20 MMOL/L — LOW (ref 22–31)
CREAT SERPL-MCNC: 2.66 MG/DL — HIGH (ref 0.5–1.3)
CREAT SERPL-MCNC: 2.66 MG/DL — HIGH (ref 0.5–1.3)
CREAT SERPL-MCNC: 2.76 MG/DL — HIGH (ref 0.5–1.3)
CREAT SERPL-MCNC: 2.93 MG/DL — HIGH (ref 0.5–1.3)
CREAT SERPL-MCNC: 2.97 MG/DL — HIGH (ref 0.5–1.3)
CREAT SERPL-MCNC: 2.98 MG/DL — HIGH (ref 0.5–1.3)
CREAT SERPL-MCNC: 3.19 MG/DL — HIGH (ref 0.5–1.3)
CREAT SERPL-MCNC: 3.22 MG/DL — HIGH (ref 0.5–1.3)
CREAT SERPL-MCNC: 3.35 MG/DL — HIGH (ref 0.5–1.3)
EGFR: 16 ML/MIN/1.73M2 — LOW
EGFR: 17 ML/MIN/1.73M2 — LOW
EGFR: 17 ML/MIN/1.73M2 — LOW
EGFR: 19 ML/MIN/1.73M2 — LOW
EGFR: 20 ML/MIN/1.73M2 — LOW
EGFR: 21 ML/MIN/1.73M2 — LOW
EGFR: 21 ML/MIN/1.73M2 — LOW
EOSINOPHIL # BLD AUTO: 0.01 K/UL — SIGNIFICANT CHANGE UP (ref 0–0.5)
EOSINOPHIL # BLD AUTO: 0.1 K/UL — SIGNIFICANT CHANGE UP (ref 0–0.5)
EOSINOPHIL NFR BLD AUTO: 0.1 % — SIGNIFICANT CHANGE UP (ref 0–6)
EOSINOPHIL NFR BLD AUTO: 1.7 % — SIGNIFICANT CHANGE UP (ref 0–6)
GLUCOSE SERPL-MCNC: 105 MG/DL — HIGH (ref 70–99)
GLUCOSE SERPL-MCNC: 106 MG/DL — HIGH (ref 70–99)
GLUCOSE SERPL-MCNC: 106 MG/DL — HIGH (ref 70–99)
GLUCOSE SERPL-MCNC: 112 MG/DL — HIGH (ref 70–99)
GLUCOSE SERPL-MCNC: 116 MG/DL — HIGH (ref 70–99)
GLUCOSE SERPL-MCNC: 120 MG/DL — HIGH (ref 70–99)
GLUCOSE SERPL-MCNC: 124 MG/DL — HIGH (ref 70–99)
GLUCOSE SERPL-MCNC: 126 MG/DL — HIGH (ref 70–99)
GLUCOSE SERPL-MCNC: 165 MG/DL — HIGH (ref 70–99)
HCT VFR BLD CALC: 22.6 % — LOW (ref 39–50)
HCT VFR BLD CALC: 24.8 % — LOW (ref 39–50)
HCT VFR BLD CALC: 24.8 % — LOW (ref 39–50)
HCT VFR BLD CALC: 25.9 % — LOW (ref 39–50)
HCT VFR BLD CALC: 26.5 % — LOW (ref 39–50)
HCT VFR BLD CALC: 26.8 % — LOW (ref 39–50)
HCT VFR BLD CALC: 27.1 % — LOW (ref 39–50)
HCT VFR BLD CALC: 29.2 % — LOW (ref 39–50)
HCT VFR BLD CALC: 29.2 % — LOW (ref 39–50)
HCT VFR BLD CALC: 34.1 % — LOW (ref 39–50)
HGB BLD-MCNC: 11.4 G/DL — LOW (ref 13–17)
HGB BLD-MCNC: 7.4 G/DL — LOW (ref 13–17)
HGB BLD-MCNC: 8.1 G/DL — LOW (ref 13–17)
HGB BLD-MCNC: 8.1 G/DL — LOW (ref 13–17)
HGB BLD-MCNC: 8.7 G/DL — LOW (ref 13–17)
HGB BLD-MCNC: 8.7 G/DL — LOW (ref 13–17)
HGB BLD-MCNC: 8.8 G/DL — LOW (ref 13–17)
HGB BLD-MCNC: 8.9 G/DL — LOW (ref 13–17)
HGB BLD-MCNC: 9.6 G/DL — LOW (ref 13–17)
HGB BLD-MCNC: 9.6 G/DL — LOW (ref 13–17)
IMM GRANULOCYTES NFR BLD AUTO: 0.3 % — SIGNIFICANT CHANGE UP (ref 0–0.9)
IMM GRANULOCYTES NFR BLD AUTO: 0.5 % — SIGNIFICANT CHANGE UP (ref 0–0.9)
INR BLD: 0.92 RATIO — SIGNIFICANT CHANGE UP (ref 0.85–1.16)
INR BLD: 1.05 RATIO — SIGNIFICANT CHANGE UP (ref 0.85–1.16)
LYMPHOCYTES # BLD AUTO: 0.33 K/UL — LOW (ref 1–3.3)
LYMPHOCYTES # BLD AUTO: 0.72 K/UL — LOW (ref 1–3.3)
LYMPHOCYTES # BLD AUTO: 12 % — LOW (ref 13–44)
LYMPHOCYTES # BLD AUTO: 4.4 % — LOW (ref 13–44)
MCHC RBC-ENTMCNC: 30.1 PG — SIGNIFICANT CHANGE UP (ref 27–34)
MCHC RBC-ENTMCNC: 30.3 PG — SIGNIFICANT CHANGE UP (ref 27–34)
MCHC RBC-ENTMCNC: 30.3 PG — SIGNIFICANT CHANGE UP (ref 27–34)
MCHC RBC-ENTMCNC: 30.5 PG — SIGNIFICANT CHANGE UP (ref 27–34)
MCHC RBC-ENTMCNC: 30.5 PG — SIGNIFICANT CHANGE UP (ref 27–34)
MCHC RBC-ENTMCNC: 30.7 PG — SIGNIFICANT CHANGE UP (ref 27–34)
MCHC RBC-ENTMCNC: 30.8 PG — SIGNIFICANT CHANGE UP (ref 27–34)
MCHC RBC-ENTMCNC: 30.8 PG — SIGNIFICANT CHANGE UP (ref 27–34)
MCHC RBC-ENTMCNC: 31 PG — SIGNIFICANT CHANGE UP (ref 27–34)
MCHC RBC-ENTMCNC: 31.1 PG — SIGNIFICANT CHANGE UP (ref 27–34)
MCHC RBC-ENTMCNC: 32.5 G/DL — SIGNIFICANT CHANGE UP (ref 32–36)
MCHC RBC-ENTMCNC: 32.7 G/DL — SIGNIFICANT CHANGE UP (ref 32–36)
MCHC RBC-ENTMCNC: 32.8 G/DL — SIGNIFICANT CHANGE UP (ref 32–36)
MCHC RBC-ENTMCNC: 32.9 G/DL — SIGNIFICANT CHANGE UP (ref 32–36)
MCHC RBC-ENTMCNC: 32.9 G/DL — SIGNIFICANT CHANGE UP (ref 32–36)
MCHC RBC-ENTMCNC: 33.2 G/DL — SIGNIFICANT CHANGE UP (ref 32–36)
MCHC RBC-ENTMCNC: 33.4 G/DL — SIGNIFICANT CHANGE UP (ref 32–36)
MCHC RBC-ENTMCNC: 33.6 G/DL — SIGNIFICANT CHANGE UP (ref 32–36)
MCV RBC AUTO: 91.5 FL — SIGNIFICANT CHANGE UP (ref 80–100)
MCV RBC AUTO: 92.2 FL — SIGNIFICANT CHANGE UP (ref 80–100)
MCV RBC AUTO: 92.7 FL — SIGNIFICANT CHANGE UP (ref 80–100)
MCV RBC AUTO: 92.7 FL — SIGNIFICANT CHANGE UP (ref 80–100)
MCV RBC AUTO: 92.9 FL — SIGNIFICANT CHANGE UP (ref 80–100)
MCV RBC AUTO: 93 FL — SIGNIFICANT CHANGE UP (ref 80–100)
MCV RBC AUTO: 93 FL — SIGNIFICANT CHANGE UP (ref 80–100)
MCV RBC AUTO: 93.4 FL — SIGNIFICANT CHANGE UP (ref 80–100)
MCV RBC AUTO: 93.6 FL — SIGNIFICANT CHANGE UP (ref 80–100)
MCV RBC AUTO: 94.5 FL — SIGNIFICANT CHANGE UP (ref 80–100)
MONOCYTES # BLD AUTO: 0.59 K/UL — SIGNIFICANT CHANGE UP (ref 0–0.9)
MONOCYTES # BLD AUTO: 0.62 K/UL — SIGNIFICANT CHANGE UP (ref 0–0.9)
MONOCYTES NFR BLD AUTO: 10.3 % — SIGNIFICANT CHANGE UP (ref 2–14)
MONOCYTES NFR BLD AUTO: 7.8 % — SIGNIFICANT CHANGE UP (ref 2–14)
NEUTROPHILS # BLD AUTO: 4.52 K/UL — SIGNIFICANT CHANGE UP (ref 1.8–7.4)
NEUTROPHILS # BLD AUTO: 6.56 K/UL — SIGNIFICANT CHANGE UP (ref 1.8–7.4)
NEUTROPHILS NFR BLD AUTO: 75 % — SIGNIFICANT CHANGE UP (ref 43–77)
NEUTROPHILS NFR BLD AUTO: 86.9 % — HIGH (ref 43–77)
NRBC BLD AUTO-RTO: 0 /100 WBCS — SIGNIFICANT CHANGE UP (ref 0–0)
PLATELET # BLD AUTO: 105 K/UL — LOW (ref 150–400)
PLATELET # BLD AUTO: 112 K/UL — LOW (ref 150–400)
PLATELET # BLD AUTO: 121 K/UL — LOW (ref 150–400)
PLATELET # BLD AUTO: 126 K/UL — LOW (ref 150–400)
PLATELET # BLD AUTO: 126 K/UL — LOW (ref 150–400)
PLATELET # BLD AUTO: 134 K/UL — LOW (ref 150–400)
PLATELET # BLD AUTO: 142 K/UL — LOW (ref 150–400)
PLATELET # BLD AUTO: 156 K/UL — SIGNIFICANT CHANGE UP (ref 150–400)
PLATELET # BLD AUTO: 74 K/UL — LOW (ref 150–400)
PLATELET # BLD AUTO: 83 K/UL — LOW (ref 150–400)
POTASSIUM SERPL-MCNC: 4.1 MMOL/L — SIGNIFICANT CHANGE UP (ref 3.5–5.3)
POTASSIUM SERPL-MCNC: 4.1 MMOL/L — SIGNIFICANT CHANGE UP (ref 3.5–5.3)
POTASSIUM SERPL-MCNC: 4.2 MMOL/L — SIGNIFICANT CHANGE UP (ref 3.5–5.3)
POTASSIUM SERPL-MCNC: 4.2 MMOL/L — SIGNIFICANT CHANGE UP (ref 3.5–5.3)
POTASSIUM SERPL-MCNC: 4.3 MMOL/L — SIGNIFICANT CHANGE UP (ref 3.5–5.3)
POTASSIUM SERPL-MCNC: 4.4 MMOL/L — SIGNIFICANT CHANGE UP (ref 3.5–5.3)
POTASSIUM SERPL-MCNC: 4.4 MMOL/L — SIGNIFICANT CHANGE UP (ref 3.5–5.3)
POTASSIUM SERPL-MCNC: 4.5 MMOL/L — SIGNIFICANT CHANGE UP (ref 3.5–5.3)
POTASSIUM SERPL-MCNC: 4.8 MMOL/L — SIGNIFICANT CHANGE UP (ref 3.5–5.3)
POTASSIUM SERPL-SCNC: 4.1 MMOL/L — SIGNIFICANT CHANGE UP (ref 3.5–5.3)
POTASSIUM SERPL-SCNC: 4.1 MMOL/L — SIGNIFICANT CHANGE UP (ref 3.5–5.3)
POTASSIUM SERPL-SCNC: 4.2 MMOL/L — SIGNIFICANT CHANGE UP (ref 3.5–5.3)
POTASSIUM SERPL-SCNC: 4.2 MMOL/L — SIGNIFICANT CHANGE UP (ref 3.5–5.3)
POTASSIUM SERPL-SCNC: 4.3 MMOL/L — SIGNIFICANT CHANGE UP (ref 3.5–5.3)
POTASSIUM SERPL-SCNC: 4.4 MMOL/L — SIGNIFICANT CHANGE UP (ref 3.5–5.3)
POTASSIUM SERPL-SCNC: 4.4 MMOL/L — SIGNIFICANT CHANGE UP (ref 3.5–5.3)
POTASSIUM SERPL-SCNC: 4.5 MMOL/L — SIGNIFICANT CHANGE UP (ref 3.5–5.3)
POTASSIUM SERPL-SCNC: 4.8 MMOL/L — SIGNIFICANT CHANGE UP (ref 3.5–5.3)
PROT SERPL-MCNC: 5 G/DL — LOW (ref 6–8.3)
PROT SERPL-MCNC: 6.2 G/DL — SIGNIFICANT CHANGE UP (ref 6–8.3)
PROTHROM AB SERPL-ACNC: 10.5 SEC — SIGNIFICANT CHANGE UP (ref 9.9–13.4)
PROTHROM AB SERPL-ACNC: 12 SEC — SIGNIFICANT CHANGE UP (ref 9.9–13.4)
RBC # BLD: 2.43 M/UL — LOW (ref 4.2–5.8)
RBC # BLD: 2.67 M/UL — LOW (ref 4.2–5.8)
RBC # BLD: 2.69 M/UL — LOW (ref 4.2–5.8)
RBC # BLD: 2.83 M/UL — LOW (ref 4.2–5.8)
RBC # BLD: 2.85 M/UL — LOW (ref 4.2–5.8)
RBC # BLD: 2.89 M/UL — LOW (ref 4.2–5.8)
RBC # BLD: 2.9 M/UL — LOW (ref 4.2–5.8)
RBC # BLD: 3.09 M/UL — LOW (ref 4.2–5.8)
RBC # BLD: 3.12 M/UL — LOW (ref 4.2–5.8)
RBC # BLD: 3.68 M/UL — LOW (ref 4.2–5.8)
RBC # FLD: 13.4 % — SIGNIFICANT CHANGE UP (ref 10.3–14.5)
RBC # FLD: 13.6 % — SIGNIFICANT CHANGE UP (ref 10.3–14.5)
RBC # FLD: 13.6 % — SIGNIFICANT CHANGE UP (ref 10.3–14.5)
RBC # FLD: 13.7 % — SIGNIFICANT CHANGE UP (ref 10.3–14.5)
RBC # FLD: 14.6 % — HIGH (ref 10.3–14.5)
RBC # FLD: 14.6 % — HIGH (ref 10.3–14.5)
RBC # FLD: 14.7 % — HIGH (ref 10.3–14.5)
RBC # FLD: 14.7 % — HIGH (ref 10.3–14.5)
RBC # FLD: 14.9 % — HIGH (ref 10.3–14.5)
RBC # FLD: 15.1 % — HIGH (ref 10.3–14.5)
RH IG SCN BLD-IMP: POSITIVE — SIGNIFICANT CHANGE UP
SODIUM SERPL-SCNC: 136 MMOL/L — SIGNIFICANT CHANGE UP (ref 135–145)
SODIUM SERPL-SCNC: 137 MMOL/L — SIGNIFICANT CHANGE UP (ref 135–145)
SODIUM SERPL-SCNC: 137 MMOL/L — SIGNIFICANT CHANGE UP (ref 135–145)
SODIUM SERPL-SCNC: 138 MMOL/L — SIGNIFICANT CHANGE UP (ref 135–145)
SODIUM SERPL-SCNC: 139 MMOL/L — SIGNIFICANT CHANGE UP (ref 135–145)
SODIUM SERPL-SCNC: 139 MMOL/L — SIGNIFICANT CHANGE UP (ref 135–145)
WBC # BLD: 4.55 K/UL — SIGNIFICANT CHANGE UP (ref 3.8–10.5)
WBC # BLD: 4.85 K/UL — SIGNIFICANT CHANGE UP (ref 3.8–10.5)
WBC # BLD: 4.97 K/UL — SIGNIFICANT CHANGE UP (ref 3.8–10.5)
WBC # BLD: 5.14 K/UL — SIGNIFICANT CHANGE UP (ref 3.8–10.5)
WBC # BLD: 5.75 K/UL — SIGNIFICANT CHANGE UP (ref 3.8–10.5)
WBC # BLD: 6.02 K/UL — SIGNIFICANT CHANGE UP (ref 3.8–10.5)
WBC # BLD: 6.53 K/UL — SIGNIFICANT CHANGE UP (ref 3.8–10.5)
WBC # BLD: 7.1 K/UL — SIGNIFICANT CHANGE UP (ref 3.8–10.5)
WBC # BLD: 7.44 K/UL — SIGNIFICANT CHANGE UP (ref 3.8–10.5)
WBC # BLD: 7.55 K/UL — SIGNIFICANT CHANGE UP (ref 3.8–10.5)
WBC # FLD AUTO: 4.55 K/UL — SIGNIFICANT CHANGE UP (ref 3.8–10.5)
WBC # FLD AUTO: 4.85 K/UL — SIGNIFICANT CHANGE UP (ref 3.8–10.5)
WBC # FLD AUTO: 4.97 K/UL — SIGNIFICANT CHANGE UP (ref 3.8–10.5)
WBC # FLD AUTO: 5.14 K/UL — SIGNIFICANT CHANGE UP (ref 3.8–10.5)
WBC # FLD AUTO: 5.75 K/UL — SIGNIFICANT CHANGE UP (ref 3.8–10.5)
WBC # FLD AUTO: 6.02 K/UL — SIGNIFICANT CHANGE UP (ref 3.8–10.5)
WBC # FLD AUTO: 6.53 K/UL — SIGNIFICANT CHANGE UP (ref 3.8–10.5)
WBC # FLD AUTO: 7.1 K/UL — SIGNIFICANT CHANGE UP (ref 3.8–10.5)
WBC # FLD AUTO: 7.44 K/UL — SIGNIFICANT CHANGE UP (ref 3.8–10.5)
WBC # FLD AUTO: 7.55 K/UL — SIGNIFICANT CHANGE UP (ref 3.8–10.5)

## 2025-01-01 PROCEDURE — 85730 THROMBOPLASTIN TIME PARTIAL: CPT

## 2025-01-01 PROCEDURE — 99285 EMERGENCY DEPT VISIT HI MDM: CPT | Mod: GC

## 2025-01-01 PROCEDURE — 86923 COMPATIBILITY TEST ELECTRIC: CPT

## 2025-01-01 PROCEDURE — 70450 CT HEAD/BRAIN W/O DYE: CPT | Mod: 26

## 2025-01-01 PROCEDURE — 86900 BLOOD TYPING SEROLOGIC ABO: CPT

## 2025-01-01 PROCEDURE — 71045 X-RAY EXAM CHEST 1 VIEW: CPT

## 2025-01-01 PROCEDURE — 86850 RBC ANTIBODY SCREEN: CPT

## 2025-01-01 PROCEDURE — 36430 TRANSFUSION BLD/BLD COMPNT: CPT

## 2025-01-01 PROCEDURE — 85025 COMPLETE CBC W/AUTO DIFF WBC: CPT

## 2025-01-01 PROCEDURE — 36415 COLL VENOUS BLD VENIPUNCTURE: CPT

## 2025-01-01 PROCEDURE — 86901 BLOOD TYPING SEROLOGIC RH(D): CPT

## 2025-01-01 PROCEDURE — 73502 X-RAY EXAM HIP UNI 2-3 VIEWS: CPT

## 2025-01-01 PROCEDURE — P9016: CPT

## 2025-01-01 PROCEDURE — 27245 TREAT THIGH FRACTURE: CPT | Mod: LT

## 2025-01-01 PROCEDURE — 99232 SBSQ HOSP IP/OBS MODERATE 35: CPT | Mod: GC

## 2025-01-01 PROCEDURE — 80053 COMPREHEN METABOLIC PANEL: CPT

## 2025-01-01 PROCEDURE — 73502 X-RAY EXAM HIP UNI 2-3 VIEWS: CPT | Mod: 26,LT

## 2025-01-01 PROCEDURE — 72125 CT NECK SPINE W/O DYE: CPT | Mod: MC

## 2025-01-01 PROCEDURE — 73562 X-RAY EXAM OF KNEE 3: CPT

## 2025-01-01 PROCEDURE — 97165 OT EVAL LOW COMPLEX 30 MIN: CPT

## 2025-01-01 PROCEDURE — 72170 X-RAY EXAM OF PELVIS: CPT

## 2025-01-01 PROCEDURE — 99285 EMERGENCY DEPT VISIT HI MDM: CPT

## 2025-01-01 PROCEDURE — 73552 X-RAY EXAM OF FEMUR 2/>: CPT | Mod: 26,LT

## 2025-01-01 PROCEDURE — 97110 THERAPEUTIC EXERCISES: CPT

## 2025-01-01 PROCEDURE — 72125 CT NECK SPINE W/O DYE: CPT | Mod: 26

## 2025-01-01 PROCEDURE — 99222 1ST HOSP IP/OBS MODERATE 55: CPT | Mod: 57,GC

## 2025-01-01 PROCEDURE — 99232 SBSQ HOSP IP/OBS MODERATE 35: CPT

## 2025-01-01 PROCEDURE — 96372 THER/PROPH/DIAG INJ SC/IM: CPT

## 2025-01-01 PROCEDURE — 97116 GAIT TRAINING THERAPY: CPT

## 2025-01-01 PROCEDURE — 70450 CT HEAD/BRAIN W/O DYE: CPT | Mod: MC

## 2025-01-01 PROCEDURE — 97530 THERAPEUTIC ACTIVITIES: CPT

## 2025-01-01 PROCEDURE — 85027 COMPLETE CBC AUTOMATED: CPT

## 2025-01-01 PROCEDURE — 76000 FLUOROSCOPY <1 HR PHYS/QHP: CPT

## 2025-01-01 PROCEDURE — 85610 PROTHROMBIN TIME: CPT

## 2025-01-01 PROCEDURE — 80048 BASIC METABOLIC PNL TOTAL CA: CPT

## 2025-01-01 PROCEDURE — 73562 X-RAY EXAM OF KNEE 3: CPT | Mod: 26,LT

## 2025-01-01 PROCEDURE — 71045 X-RAY EXAM CHEST 1 VIEW: CPT | Mod: 26

## 2025-01-01 PROCEDURE — C1713: CPT

## 2025-01-01 PROCEDURE — 73552 X-RAY EXAM OF FEMUR 2/>: CPT

## 2025-01-01 PROCEDURE — 97161 PT EVAL LOW COMPLEX 20 MIN: CPT

## 2025-01-01 PROCEDURE — 86985 SPLIT BLOOD OR PRODUCTS: CPT

## 2025-01-01 PROCEDURE — P9011: CPT

## 2025-01-01 PROCEDURE — 99222 1ST HOSP IP/OBS MODERATE 55: CPT | Mod: GC

## 2025-01-01 PROCEDURE — 72170 X-RAY EXAM OF PELVIS: CPT | Mod: 26,XE

## 2025-01-01 DEVICE — NAIL CANN TFNA 130 DEG 11X235MM LT: Type: IMPLANTABLE DEVICE | Site: LEFT | Status: FUNCTIONAL

## 2025-01-01 DEVICE — SCREW LOKG STRL 5X38MM: Type: IMPLANTABLE DEVICE | Site: LEFT | Status: FUNCTIONAL

## 2025-01-01 DEVICE — IMPLANTABLE DEVICE: Type: IMPLANTABLE DEVICE | Site: LEFT | Status: FUNCTIONAL

## 2025-01-01 RX ORDER — OXYCODONE HYDROCHLORIDE 30 MG/1
1 TABLET ORAL
Qty: 0 | Refills: 0 | DISCHARGE
Start: 2025-01-01

## 2025-01-01 RX ORDER — SENNA 187 MG
2 TABLET ORAL AT BEDTIME
Refills: 0 | Status: DISCONTINUED | OUTPATIENT
Start: 2025-01-01 | End: 2025-01-01

## 2025-01-01 RX ORDER — AMLODIPINE BESYLATE 10 MG/1
10 TABLET ORAL AT BEDTIME
Refills: 0 | Status: DISCONTINUED | OUTPATIENT
Start: 2025-01-01 | End: 2025-01-01

## 2025-01-01 RX ORDER — HYDROMORPHONE/SOD CHLOR,ISO/PF 2 MG/10 ML
0.25 SYRINGE (ML) INJECTION
Refills: 0 | Status: DISCONTINUED | OUTPATIENT
Start: 2025-01-01 | End: 2025-01-01

## 2025-01-01 RX ORDER — ISOSORBIDE MONONITRATE 60 MG/1
60 TABLET, EXTENDED RELEASE ORAL DAILY
Refills: 0 | Status: DISCONTINUED | OUTPATIENT
Start: 2025-01-01 | End: 2025-01-01

## 2025-01-01 RX ORDER — ENOXAPARIN SODIUM 100 MG/ML
40 INJECTION SUBCUTANEOUS EVERY 24 HOURS
Refills: 0 | Status: DISCONTINUED | OUTPATIENT
Start: 2025-01-01 | End: 2025-01-01

## 2025-01-01 RX ORDER — POVIDONE-IODINE 7.5 %
1 SOLUTION, NON-ORAL TOPICAL ONCE
Refills: 0 | Status: COMPLETED | OUTPATIENT
Start: 2025-01-01 | End: 2025-01-01

## 2025-01-01 RX ORDER — METOPROLOL SUCCINATE 50 MG/1
25 TABLET, EXTENDED RELEASE ORAL AT BEDTIME
Refills: 0 | Status: DISCONTINUED | OUTPATIENT
Start: 2025-01-01 | End: 2025-01-01

## 2025-01-01 RX ORDER — POLYETHYLENE GLYCOL 3350 17 G/17G
17 POWDER, FOR SOLUTION ORAL
Qty: 0 | Refills: 0 | DISCHARGE
Start: 2025-01-01

## 2025-01-01 RX ORDER — DOXAZOSIN MESYLATE 8 MG/1
2 TABLET ORAL AT BEDTIME
Refills: 0 | Status: DISCONTINUED | OUTPATIENT
Start: 2025-01-01 | End: 2025-01-01

## 2025-01-01 RX ORDER — SODIUM BICARBONATE 1 MEQ/ML
650 SYRINGE (ML) INTRAVENOUS
Refills: 0 | Status: DISCONTINUED | OUTPATIENT
Start: 2025-01-01 | End: 2025-01-01

## 2025-01-01 RX ORDER — APIXABAN 2.5 MG/1
1 TABLET, FILM COATED ORAL
Qty: 0 | Refills: 0 | DISCHARGE
Start: 2025-01-01

## 2025-01-01 RX ORDER — POLYETHYLENE GLYCOL 3350 17 G/17G
17 POWDER, FOR SOLUTION ORAL AT BEDTIME
Refills: 0 | Status: DISCONTINUED | OUTPATIENT
Start: 2025-01-01 | End: 2025-01-01

## 2025-01-01 RX ORDER — HEPARIN SODIUM 1000 [USP'U]/ML
5000 INJECTION INTRAVENOUS; SUBCUTANEOUS ONCE
Refills: 0 | Status: COMPLETED | OUTPATIENT
Start: 2025-01-01 | End: 2025-01-01

## 2025-01-01 RX ORDER — SENNA 187 MG
2 TABLET ORAL
Qty: 0 | Refills: 0 | DISCHARGE
Start: 2025-01-01

## 2025-01-01 RX ORDER — TORSEMIDE 10 MG
10 TABLET ORAL ONCE
Refills: 0 | Status: COMPLETED | OUTPATIENT
Start: 2025-01-01 | End: 2025-01-01

## 2025-01-01 RX ORDER — OXYCODONE HYDROCHLORIDE 30 MG/1
2.5 TABLET ORAL EVERY 4 HOURS
Refills: 0 | Status: DISCONTINUED | OUTPATIENT
Start: 2025-01-01 | End: 2025-01-01

## 2025-01-01 RX ORDER — TORSEMIDE 10 MG
1 TABLET ORAL
Qty: 0 | Refills: 0 | DISCHARGE
Start: 2025-01-01

## 2025-01-01 RX ORDER — APIXABAN 2.5 MG/1
2.5 TABLET, FILM COATED ORAL EVERY 12 HOURS
Refills: 0 | Status: DISCONTINUED | OUTPATIENT
Start: 2025-01-01 | End: 2025-01-01

## 2025-01-01 RX ORDER — ACETAMINOPHEN 500 MG/5ML
3 LIQUID (ML) ORAL
Qty: 0 | Refills: 0 | DISCHARGE
Start: 2025-01-01

## 2025-01-01 RX ORDER — DOXAZOSIN MESYLATE 8 MG/1
1 TABLET ORAL
Qty: 0 | Refills: 0 | DISCHARGE
Start: 2025-01-01

## 2025-01-01 RX ORDER — FINASTERIDE 1 MG/1
5 TABLET, FILM COATED ORAL DAILY
Refills: 0 | Status: DISCONTINUED | OUTPATIENT
Start: 2025-01-01 | End: 2025-01-01

## 2025-01-01 RX ORDER — ATORVASTATIN CALCIUM 80 MG/1
10 TABLET, FILM COATED ORAL AT BEDTIME
Refills: 0 | Status: DISCONTINUED | OUTPATIENT
Start: 2025-01-01 | End: 2025-01-01

## 2025-01-01 RX ORDER — ACETAMINOPHEN 500 MG/5ML
975 LIQUID (ML) ORAL EVERY 8 HOURS
Refills: 0 | Status: DISCONTINUED | OUTPATIENT
Start: 2025-01-01 | End: 2025-01-01

## 2025-01-01 RX ORDER — FUROSEMIDE 10 MG/ML
10 INJECTION INTRAMUSCULAR; INTRAVENOUS
Refills: 0 | Status: DISCONTINUED | OUTPATIENT
Start: 2025-01-01 | End: 2025-01-01

## 2025-01-01 RX ORDER — CEFAZOLIN SODIUM IN 0.9 % NACL 3 G/100 ML
1000 INTRAVENOUS SOLUTION, PIGGYBACK (ML) INTRAVENOUS EVERY 12 HOURS
Refills: 0 | Status: COMPLETED | OUTPATIENT
Start: 2025-01-01 | End: 2025-01-01

## 2025-01-01 RX ORDER — TORSEMIDE 10 MG
10 TABLET ORAL
Refills: 0 | Status: DISCONTINUED | OUTPATIENT
Start: 2025-01-01 | End: 2025-01-01

## 2025-01-01 RX ORDER — ONDANSETRON HCL/PF 4 MG/2 ML
4 VIAL (ML) INJECTION ONCE
Refills: 0 | Status: DISCONTINUED | OUTPATIENT
Start: 2025-01-01 | End: 2025-01-01

## 2025-01-01 RX ORDER — FOLIC ACID 1 MG/1
1 TABLET ORAL DAILY
Refills: 0 | Status: DISCONTINUED | OUTPATIENT
Start: 2025-01-01 | End: 2025-01-01

## 2025-01-01 RX ORDER — OXYCODONE HYDROCHLORIDE 30 MG/1
5 TABLET ORAL EVERY 4 HOURS
Refills: 0 | Status: DISCONTINUED | OUTPATIENT
Start: 2025-01-01 | End: 2025-01-01

## 2025-01-01 RX ADMIN — FINASTERIDE 5 MILLIGRAM(S): 1 TABLET, FILM COATED ORAL at 13:23

## 2025-01-01 RX ADMIN — Medication 40 MILLIGRAM(S): at 05:12

## 2025-01-01 RX ADMIN — Medication 975 MILLIGRAM(S): at 14:29

## 2025-01-01 RX ADMIN — Medication 975 MILLIGRAM(S): at 05:12

## 2025-01-01 RX ADMIN — Medication 10 MILLIGRAM(S): at 12:38

## 2025-01-01 RX ADMIN — Medication 975 MILLIGRAM(S): at 14:03

## 2025-01-01 RX ADMIN — Medication 100 MILLIGRAM(S): at 13:23

## 2025-01-01 RX ADMIN — ISOSORBIDE MONONITRATE 60 MILLIGRAM(S): 60 TABLET, EXTENDED RELEASE ORAL at 13:05

## 2025-01-01 RX ADMIN — DOXAZOSIN MESYLATE 2 MILLIGRAM(S): 8 TABLET ORAL at 21:02

## 2025-01-01 RX ADMIN — AMLODIPINE BESYLATE 10 MILLIGRAM(S): 10 TABLET ORAL at 21:08

## 2025-01-01 RX ADMIN — Medication 40 MILLIGRAM(S): at 05:45

## 2025-01-01 RX ADMIN — Medication 50 MILLILITER(S): at 06:40

## 2025-01-01 RX ADMIN — Medication 975 MILLIGRAM(S): at 13:35

## 2025-01-01 RX ADMIN — DOXAZOSIN MESYLATE 2 MILLIGRAM(S): 8 TABLET ORAL at 21:29

## 2025-01-01 RX ADMIN — FINASTERIDE 5 MILLIGRAM(S): 1 TABLET, FILM COATED ORAL at 22:54

## 2025-01-01 RX ADMIN — Medication 975 MILLIGRAM(S): at 05:37

## 2025-01-01 RX ADMIN — APIXABAN 2.5 MILLIGRAM(S): 2.5 TABLET, FILM COATED ORAL at 20:19

## 2025-01-01 RX ADMIN — ATORVASTATIN CALCIUM 10 MILLIGRAM(S): 80 TABLET, FILM COATED ORAL at 22:24

## 2025-01-01 RX ADMIN — Medication 650 MILLIGRAM(S): at 08:27

## 2025-01-01 RX ADMIN — Medication 2 TABLET(S): at 22:25

## 2025-01-01 RX ADMIN — Medication 100 MILLIGRAM(S): at 05:12

## 2025-01-01 RX ADMIN — Medication 975 MILLIGRAM(S): at 13:24

## 2025-01-01 RX ADMIN — Medication 100 MILLIGRAM(S): at 14:08

## 2025-01-01 RX ADMIN — Medication 100 MILLIGRAM(S): at 22:24

## 2025-01-01 RX ADMIN — Medication 100 MILLIGRAM(S): at 12:24

## 2025-01-01 RX ADMIN — Medication 100 MILLIGRAM(S): at 21:30

## 2025-01-01 RX ADMIN — Medication 100 MILLIGRAM(S): at 12:12

## 2025-01-01 RX ADMIN — Medication 975 MILLIGRAM(S): at 14:08

## 2025-01-01 RX ADMIN — AMLODIPINE BESYLATE 10 MILLIGRAM(S): 10 TABLET ORAL at 21:49

## 2025-01-01 RX ADMIN — Medication 975 MILLIGRAM(S): at 21:23

## 2025-01-01 RX ADMIN — Medication 2 TABLET(S): at 20:43

## 2025-01-01 RX ADMIN — Medication 975 MILLIGRAM(S): at 13:05

## 2025-01-01 RX ADMIN — Medication 975 MILLIGRAM(S): at 23:24

## 2025-01-01 RX ADMIN — ISOSORBIDE MONONITRATE 60 MILLIGRAM(S): 60 TABLET, EXTENDED RELEASE ORAL at 12:13

## 2025-01-01 RX ADMIN — Medication 100 MILLIGRAM(S): at 05:36

## 2025-01-01 RX ADMIN — Medication 975 MILLIGRAM(S): at 05:39

## 2025-01-01 RX ADMIN — FINASTERIDE 5 MILLIGRAM(S): 1 TABLET, FILM COATED ORAL at 11:58

## 2025-01-01 RX ADMIN — Medication 100 MILLIGRAM(S): at 05:29

## 2025-01-01 RX ADMIN — Medication 40 MILLIGRAM(S): at 05:28

## 2025-01-01 RX ADMIN — Medication 975 MILLIGRAM(S): at 20:43

## 2025-01-01 RX ADMIN — AMLODIPINE BESYLATE 10 MILLIGRAM(S): 10 TABLET ORAL at 21:02

## 2025-01-01 RX ADMIN — APIXABAN 2.5 MILLIGRAM(S): 2.5 TABLET, FILM COATED ORAL at 07:59

## 2025-01-01 RX ADMIN — AMLODIPINE BESYLATE 10 MILLIGRAM(S): 10 TABLET ORAL at 20:42

## 2025-01-01 RX ADMIN — ISOSORBIDE MONONITRATE 60 MILLIGRAM(S): 60 TABLET, EXTENDED RELEASE ORAL at 11:58

## 2025-01-01 RX ADMIN — Medication 975 MILLIGRAM(S): at 15:08

## 2025-01-01 RX ADMIN — Medication 975 MILLIGRAM(S): at 21:30

## 2025-01-01 RX ADMIN — Medication 975 MILLIGRAM(S): at 22:00

## 2025-01-01 RX ADMIN — FINASTERIDE 5 MILLIGRAM(S): 1 TABLET, FILM COATED ORAL at 13:08

## 2025-01-01 RX ADMIN — Medication 975 MILLIGRAM(S): at 14:33

## 2025-01-01 RX ADMIN — Medication 975 MILLIGRAM(S): at 21:02

## 2025-01-01 RX ADMIN — Medication 2 TABLET(S): at 21:08

## 2025-01-01 RX ADMIN — FINASTERIDE 5 MILLIGRAM(S): 1 TABLET, FILM COATED ORAL at 13:05

## 2025-01-01 RX ADMIN — Medication 975 MILLIGRAM(S): at 22:30

## 2025-01-01 RX ADMIN — Medication 100 MILLIGRAM(S): at 21:08

## 2025-01-01 RX ADMIN — Medication 100 MILLIGRAM(S): at 13:35

## 2025-01-01 RX ADMIN — OXYCODONE HYDROCHLORIDE 2.5 MILLIGRAM(S): 30 TABLET ORAL at 11:58

## 2025-01-01 RX ADMIN — APIXABAN 2.5 MILLIGRAM(S): 2.5 TABLET, FILM COATED ORAL at 20:25

## 2025-01-01 RX ADMIN — POLYETHYLENE GLYCOL 3350 17 GRAM(S): 17 POWDER, FOR SOLUTION ORAL at 22:25

## 2025-01-01 RX ADMIN — Medication 100 MILLIGRAM(S): at 13:05

## 2025-01-01 RX ADMIN — METOPROLOL SUCCINATE 25 MILLIGRAM(S): 50 TABLET, EXTENDED RELEASE ORAL at 21:07

## 2025-01-01 RX ADMIN — METOPROLOL SUCCINATE 25 MILLIGRAM(S): 50 TABLET, EXTENDED RELEASE ORAL at 21:49

## 2025-01-01 RX ADMIN — AMLODIPINE BESYLATE 10 MILLIGRAM(S): 10 TABLET ORAL at 22:24

## 2025-01-01 RX ADMIN — Medication 975 MILLIGRAM(S): at 13:29

## 2025-01-01 RX ADMIN — ISOSORBIDE MONONITRATE 60 MILLIGRAM(S): 60 TABLET, EXTENDED RELEASE ORAL at 12:23

## 2025-01-01 RX ADMIN — DOXAZOSIN MESYLATE 2 MILLIGRAM(S): 8 TABLET ORAL at 21:23

## 2025-01-01 RX ADMIN — ATORVASTATIN CALCIUM 10 MILLIGRAM(S): 80 TABLET, FILM COATED ORAL at 21:30

## 2025-01-01 RX ADMIN — METOPROLOL SUCCINATE 25 MILLIGRAM(S): 50 TABLET, EXTENDED RELEASE ORAL at 21:23

## 2025-01-01 RX ADMIN — Medication 40 MILLIGRAM(S): at 05:38

## 2025-01-01 RX ADMIN — Medication 100 MILLIGRAM(S): at 21:02

## 2025-01-01 RX ADMIN — OXYCODONE HYDROCHLORIDE 2.5 MILLIGRAM(S): 30 TABLET ORAL at 12:58

## 2025-01-01 RX ADMIN — ISOSORBIDE MONONITRATE 60 MILLIGRAM(S): 60 TABLET, EXTENDED RELEASE ORAL at 12:05

## 2025-01-01 RX ADMIN — Medication 40 MILLIGRAM(S): at 05:36

## 2025-01-01 RX ADMIN — APIXABAN 2.5 MILLIGRAM(S): 2.5 TABLET, FILM COATED ORAL at 09:49

## 2025-01-01 RX ADMIN — FOLIC ACID 1 MILLIGRAM(S): 1 TABLET ORAL at 12:24

## 2025-01-01 RX ADMIN — AMLODIPINE BESYLATE 10 MILLIGRAM(S): 10 TABLET ORAL at 21:23

## 2025-01-01 RX ADMIN — FINASTERIDE 5 MILLIGRAM(S): 1 TABLET, FILM COATED ORAL at 20:42

## 2025-01-01 RX ADMIN — FINASTERIDE 5 MILLIGRAM(S): 1 TABLET, FILM COATED ORAL at 12:24

## 2025-01-01 RX ADMIN — ISOSORBIDE MONONITRATE 60 MILLIGRAM(S): 60 TABLET, EXTENDED RELEASE ORAL at 13:24

## 2025-01-01 RX ADMIN — DOXAZOSIN MESYLATE 2 MILLIGRAM(S): 8 TABLET ORAL at 21:08

## 2025-01-01 RX ADMIN — POLYETHYLENE GLYCOL 3350 17 GRAM(S): 17 POWDER, FOR SOLUTION ORAL at 21:28

## 2025-01-01 RX ADMIN — Medication 100 MILLIGRAM(S): at 13:04

## 2025-01-01 RX ADMIN — OXYCODONE HYDROCHLORIDE 2.5 MILLIGRAM(S): 30 TABLET ORAL at 21:49

## 2025-01-01 RX ADMIN — Medication 100 MILLIGRAM(S): at 21:23

## 2025-01-01 RX ADMIN — ATORVASTATIN CALCIUM 10 MILLIGRAM(S): 80 TABLET, FILM COATED ORAL at 21:23

## 2025-01-01 RX ADMIN — METOPROLOL SUCCINATE 25 MILLIGRAM(S): 50 TABLET, EXTENDED RELEASE ORAL at 22:24

## 2025-01-01 RX ADMIN — Medication 975 MILLIGRAM(S): at 05:44

## 2025-01-01 RX ADMIN — APIXABAN 2.5 MILLIGRAM(S): 2.5 TABLET, FILM COATED ORAL at 21:08

## 2025-01-01 RX ADMIN — FOLIC ACID 1 MILLIGRAM(S): 1 TABLET ORAL at 13:04

## 2025-01-01 RX ADMIN — APIXABAN 2.5 MILLIGRAM(S): 2.5 TABLET, FILM COATED ORAL at 10:31

## 2025-01-01 RX ADMIN — Medication 975 MILLIGRAM(S): at 14:35

## 2025-01-01 RX ADMIN — Medication 100 MILLIGRAM(S): at 05:44

## 2025-01-01 RX ADMIN — DOXAZOSIN MESYLATE 2 MILLIGRAM(S): 8 TABLET ORAL at 22:49

## 2025-01-01 RX ADMIN — Medication 100 MILLIGRAM(S): at 13:24

## 2025-01-01 RX ADMIN — Medication 975 MILLIGRAM(S): at 05:28

## 2025-01-01 RX ADMIN — Medication 100 MILLIGRAM(S): at 05:39

## 2025-01-01 RX ADMIN — FINASTERIDE 5 MILLIGRAM(S): 1 TABLET, FILM COATED ORAL at 12:13

## 2025-01-01 RX ADMIN — Medication 1 APPLICATION(S): at 06:39

## 2025-01-01 RX ADMIN — Medication 60 MILLILITER(S): at 05:47

## 2025-01-01 RX ADMIN — Medication 975 MILLIGRAM(S): at 21:49

## 2025-01-01 RX ADMIN — Medication 2 TABLET(S): at 21:29

## 2025-01-01 RX ADMIN — ATORVASTATIN CALCIUM 10 MILLIGRAM(S): 80 TABLET, FILM COATED ORAL at 21:01

## 2025-01-01 RX ADMIN — ATORVASTATIN CALCIUM 10 MILLIGRAM(S): 80 TABLET, FILM COATED ORAL at 21:07

## 2025-01-01 RX ADMIN — Medication 100 MILLIGRAM(S): at 13:29

## 2025-01-01 RX ADMIN — Medication 975 MILLIGRAM(S): at 22:24

## 2025-01-01 RX ADMIN — AMLODIPINE BESYLATE 10 MILLIGRAM(S): 10 TABLET ORAL at 21:29

## 2025-01-01 RX ADMIN — APIXABAN 2.5 MILLIGRAM(S): 2.5 TABLET, FILM COATED ORAL at 21:02

## 2025-01-01 RX ADMIN — HEPARIN SODIUM 5000 UNIT(S): 1000 INJECTION INTRAVENOUS; SUBCUTANEOUS at 21:17

## 2025-01-01 RX ADMIN — Medication 100 MILLIGRAM(S): at 15:59

## 2025-01-01 RX ADMIN — METOPROLOL SUCCINATE 25 MILLIGRAM(S): 50 TABLET, EXTENDED RELEASE ORAL at 21:02

## 2025-01-01 RX ADMIN — DOXAZOSIN MESYLATE 2 MILLIGRAM(S): 8 TABLET ORAL at 22:24

## 2025-01-01 RX ADMIN — Medication 100 MILLIGRAM(S): at 11:58

## 2025-01-01 RX ADMIN — Medication 40 MILLIGRAM(S): at 05:39

## 2025-01-01 RX ADMIN — Medication 975 MILLIGRAM(S): at 14:05

## 2025-01-01 RX ADMIN — Medication 100 MILLIGRAM(S): at 12:06

## 2025-01-01 RX ADMIN — Medication 1 APPLICATION(S): at 15:06

## 2025-01-01 RX ADMIN — Medication 100 MILLIGRAM(S): at 22:49

## 2025-01-01 RX ADMIN — FOLIC ACID 1 MILLIGRAM(S): 1 TABLET ORAL at 13:05

## 2025-01-01 RX ADMIN — POLYETHYLENE GLYCOL 3350 17 GRAM(S): 17 POWDER, FOR SOLUTION ORAL at 21:09

## 2025-01-01 RX ADMIN — Medication 650 MILLIGRAM(S): at 08:36

## 2025-01-01 RX ADMIN — Medication 650 MILLIGRAM(S): at 07:59

## 2025-01-01 RX ADMIN — Medication 500 MILLILITER(S): at 05:40

## 2025-01-01 RX ADMIN — Medication 975 MILLIGRAM(S): at 21:09

## 2025-01-01 RX ADMIN — METOPROLOL SUCCINATE 25 MILLIGRAM(S): 50 TABLET, EXTENDED RELEASE ORAL at 21:25

## 2025-01-01 RX ADMIN — Medication 100 MILLIGRAM(S): at 21:29

## 2025-01-01 RX ADMIN — APIXABAN 2.5 MILLIGRAM(S): 2.5 TABLET, FILM COATED ORAL at 08:36

## 2025-01-01 RX ADMIN — Medication 100 MILLIGRAM(S): at 05:38

## 2025-01-01 RX ADMIN — Medication 975 MILLIGRAM(S): at 06:30

## 2025-01-01 RX ADMIN — FOLIC ACID 1 MILLIGRAM(S): 1 TABLET ORAL at 11:58

## 2025-01-01 RX ADMIN — FOLIC ACID 1 MILLIGRAM(S): 1 TABLET ORAL at 14:08

## 2025-01-01 RX ADMIN — Medication 975 MILLIGRAM(S): at 14:00

## 2025-01-01 RX ADMIN — DOXAZOSIN MESYLATE 2 MILLIGRAM(S): 8 TABLET ORAL at 21:30

## 2025-01-01 RX ADMIN — Medication 975 MILLIGRAM(S): at 14:09

## 2025-01-01 RX ADMIN — METOPROLOL SUCCINATE 25 MILLIGRAM(S): 50 TABLET, EXTENDED RELEASE ORAL at 20:42

## 2025-01-01 RX ADMIN — FOLIC ACID 1 MILLIGRAM(S): 1 TABLET ORAL at 13:24

## 2025-01-09 ENCOUNTER — NON-APPOINTMENT (OUTPATIENT)
Age: 89
End: 2025-01-09

## 2025-01-28 ENCOUNTER — NON-APPOINTMENT (OUTPATIENT)
Age: 89
End: 2025-01-28

## 2025-02-12 NOTE — ED PROVIDER NOTE - CADM POA PRESS ULCER
Pt's mom called and stated that she was told that medication would be called in to the pharmacy if pt is still not feeling well  Pt was seen at the office on 12/5  Pt is having congestion and soar throat      Please advise No

## 2025-02-12 NOTE — CONSULT NOTE ADULT - PROBLEM SELECTOR RECOMMENDATION 3
continue to monitor creatinine   continue sodium bicarb  start gentle hydration  consider a nephrology eval  avoid nephrotoxic agents

## 2025-02-12 NOTE — CONSULT NOTE ADULT - TIME-BASED BILLING (NON-CRITICAL CARE)
Caller would like to discuss a Return call. Treva is returning Alma Rosa's call    Patient Name: Charlenemerline CLEMENTE Lars  Caller Name: Treva  Name of Facility: [facility]  Callback Number: [phone]  Best Availability: any  Did you confirm the message with the caller?: yes    Thank you,  Katia Casanova    
Left message I spoke with ivette yesterday, no further contact from her is necessary  
Time-based billing (NON-critical care)

## 2025-02-12 NOTE — H&P ADULT - HISTORY OF PRESENT ILLNESS
HPI  89yMale w/ PMH of  c/o L hip pain s/p mechanical fall while moving a fridge today. Unable to bear weight in the LLE since the fall. Denies headstrike or LOC. Denies numbness/tingling in the LLE. Denies any other trauma/injuries at this time. At baseline, community ambulator occasionally a cane.    ROS  Negative unless otherwise specified in HPI.    PAST MEDICAL & SURGICAL Hx  PAST MEDICAL & SURGICAL HISTORY:  HTN - Hypertension      Macular Degeneration  Bilateral      Hyperparathyroidism      Dyslipidemia      Chronic renal insufficiency  creatinine: 2.63      ORIF  left ankle 1990      Cataract - left  and right      H/O rectal polypectomy  2020          MEDICATIONS  Home Medications:  allopurinol 100 mg oral tablet: 1 tab(s) orally once a day (22 Nov 2022 06:49)  amLODIPine 10 mg oral tablet: 1 tab(s) orally once a day (at bedtime) (22 Nov 2022 06:49)  AREDS:  (22 Nov 2022 06:49)  calcitriol 0.25 mcg oral capsule: 1 cap(s) orally (22 Nov 2022 06:49)  doxazosin 2 mg oral tablet: 1 tab(s) orally once a day (at bedtime) (22 Nov 2022 06:49)  finasteride 5 mg oral tablet: 1 tab(s) orally once a day (at bedtime) (22 Nov 2022 06:49)  hydrALAZINE 100 mg oral tablet: 1 tab(s) orally 3 times a day (22 Nov 2022 06:49)  metoprolol succinate 25 mg oral tablet, extended release: 1 tab(s) orally once a day (at bedtime) (22 Nov 2022 06:49)  pravastatin 20 mg oral tablet: 1 tab(s) orally once a day (at bedtime) (22 Nov 2022 06:49)  torsemide 10 mg oral tablet: 1 tab(s) orally 5 times a week, no Sat, no Wed (22 Nov 2022 06:49)  ubiquinol 100 mg/mL oral liquid:  (22 Nov 2022 06:49)  Vitamin B-12 1000 mcg oral tablet: 1 tab(s) orally once a day (22 Nov 2022 06:49)  Vitamin D3 2000 intl units (50 mcg) oral tablet: 1 tab(s) orally once a day (22 Nov 2022 06:49)      ALLERGIES  No Known Allergies      FAMILY Hx  FAMILY HISTORY:      SOCIAL Hx  Social History:      VITALS  Vital Signs Last 24 Hrs  T(C): 36.2 (12 Feb 2025 18:10), Max: 36.4 (12 Feb 2025 17:23)  T(F): 97.1 (12 Feb 2025 18:10), Max: 97.5 (12 Feb 2025 17:23)  HR: 67 (12 Feb 2025 18:10) (66 - 67)  BP: 174/76 (12 Feb 2025 18:10) (174/76 - 185/77)  BP(mean): --  RR: 17 (12 Feb 2025 18:10) (16 - 17)  SpO2: 96% (12 Feb 2025 18:10) (95% - 96%)    Parameters below as of 12 Feb 2025 18:10  Patient On (Oxygen Delivery Method): room air        PHYSICAL EXAM  Gen: Lying in bed, NAD  Resp: No increased WOB  LLE:  Skin intact, no noticeable edema or ecchymosis over L hip  +TTP over L hip, no TTP along remainder of extremity; compartments soft  Limited ROM at hip 2/2 pain  +Log roll test  +Pain with axial loading  Motor: TA/EHL/GS/FHL intact  Sensory: DP/SP/Tib/Gary/Saph SILT  +DP pulse, WWP    Secondary survey:  No TTP along spine or other extremities,  SILT and compartments soft throughout    LABS                        11.4   6.02  )-----------( 134      ( 12 Feb 2025 17:59 )             34.1     02-12    138  |  105  |  60[H]  ----------------------------<  124[H]  4.2   |  20[L]  |  2.76[H]    Ca    9.9      12 Feb 2025 17:59    TPro  6.2  /  Alb  3.8  /  TBili  0.2  /  DBili  x   /  AST  21  /  ALT  15  /  AlkPhos  78  02-12    PT/INR - ( 12 Feb 2025 17:59 )   PT: 10.5 sec;   INR: 0.92 ratio         PTT - ( 12 Feb 2025 17:59 )  PTT:30.4 sec    IMAGING  XRs: L IT fx     ASSESSMENT & PLAN  89yMale w/ L IT fx.  -NWB LLE, bedrest  -OR on 2/13  -f/u preop: CBC, BMP, coags, T&S x2, CXR, EKG  -NPO past midnight, IVF  -hold chemical DVT ppx for OR; SCDs OK  -please document medical clearance ASAP for OR  -pain control  -ice/cold compress    For all questions related to patient care, please reach out to the on-call team via the pager.     Rubi Morales, PGY 3  Orthopaedic Surgery  [facility] [phone]  [facility] [phone]  [facility] [phone]

## 2025-02-12 NOTE — ED ADULT NURSE NOTE - OBJECTIVE STATEMENT
89 y/o Male presents to ED from home via ems with c/o fall. PMH of htn, hld. Per EMS pt was trying to move fridge and had a mechanical trip and fall onto left side. Pt endorsing 10/10 pain on left him. Unable to walk at scene. Pt was given 10mg of morphine via EMS at 1631. Denies SOB, CP, HA, fever, chills, cough, dizziness. Pt is A&Ox3, appears in pain. speaking full sentences without difficulty. Airway patent with spontaneous breathing, equal sensation, skin is warm and normal color for race. Pt placed on continuous cardiac monitor. IV inserted via EMS. labs drawn and sent. Safety maintained bed is in the lowest position, locked and call bell in reach.

## 2025-02-12 NOTE — PATIENT PROFILE ADULT - FUNCTIONAL ASSESSMENT - BASIC MOBILITY 6.
4-calculated by average/Not able to assess (calculate score using [facility] averaging method)

## 2025-02-12 NOTE — ED PROVIDER NOTE - CLINICAL SUMMARY MEDICAL DECISION MAKING FREE TEXT BOX
Attending note.  Patient was seen in room #31 to the left.  Patient was brought in by EMS from home after a trip and fall.  Patient complaining of severe left hip and groin pain.  Denies hitting his neck or head.  He has no numbness or paresthesia.  Denies any chest/rib pain, abdominal pain, back pain or injury to the upper or right lower extremities.  He has a history of hypertension, hyperlipidemia and BPH.  He takes amlodipine, metoprolol, hydralazine, finasteride.  He has no allergies to medications.  He denies any headache, dizziness, nausea vomiting.  Patient received 10 mg of morphine from EMS to aid in transfer.     ROS-as above, otherwise negative.  PE-patient is alert in no acute distress except when moving.  Head is normocephalic and atraumatic.  Pupils are 3 mm equal and reactive with bilateral IOL.  There is no facial tenderness.  There is no cervical, thoracic or lumbar tenderness.  Chest and ribs are nontender.  Lungs are clear and equal bilaterally.  Heart is regular rate and rhythm.  Examination of the abdomen reveals ventral hernia but otherwise nontender nondistended.  Pelvis is nontender and stable.  He has tenderness in the left hip and groin and with any attempted active range of motion of the left lower leg.  Left leg is shortened and  Rotated.  Distal pulses are intact.  Patient able to move the upper extremities without pain or tenderness.  There is no pain or tenderness in the right lower extremity.  He has trace pitting edema to the ankles bilaterally.  Neurologic examination is grossly intact except for hard of hearing.      A/P-mechanical fall with left hip and groin pain with shortening and external rotation of the left leg.  Concern for left hip fracture.  CT head and cervical spine, chest x-ray, presurgical labs, x-ray of pelvis, left hip and left femur.  Analgesia as needed.  Orthopedic consultation.

## 2025-02-12 NOTE — CONSULT NOTE ADULT - SUBJECTIVE AND OBJECTIVE BOX
89-year-old male patient past medical history of hypertension  CKD, and hyperlipidemia who presents to the emergency department after a fall.  Patient states he was attempting to push a fridge with wheels when he slipped and fell on his left side.  Presenting to the emergency department with left hip pain since. Patient was brought to [facility] for further evaluation and treatment. In the ED he was found to have a left hip fracture and is scheduled for an IMN. Patient seen now resting comfortably.     Patient is a 89y old  Male who presents with a chief complaint of     PAST MEDICAL & SURGICAL HISTORY:  HTN - Hypertension      Macular Degeneration  Bilateral      Hyperparathyroidism      Dyslipidemia      Chronic renal insufficiency  creatinine: 2.63      ORIF  left ankle 1990      Cataract - left  and right      H/O rectal polypectomy  2020            MEDICATIONS  (STANDING):    Hydralazine 100mg  TID  Lasix 10mg daily except wed and sat  Folic acid 1mg daily  Imdur 60mg daily  allopurinol 100mg daily  Metoprolol 25 Mg 7:30 Pm daily  amlodipine 10 mg 8:30 PM daily  Finasteride 5mg 8:30 PM daily  Pravastatin 20 mg Qhs  Doxazosin 2mg qhs  sodium Bicarb 650mg MWF  Cholecalciferol .25 MWF        MEDICATIONS  (PRN):        CONSTITUTIONAL: No weakness, fevers or chills  EYES/ENT: No visual changes;  No vertigo or throat pain   NECK: No pain or stiffness  RESPIRATORY: No cough, wheezing, hemoptysis; No shortness of breath  CARDIOVASCULAR: No chest pain or palpitations  GASTROINTESTINAL: No abdominal or epigastric pain. No nausea, vomiting, or hematemesis; No diarrhea or constipation. No melena or hematochezia.  GENITOURINARY: No dysuria, frequency or hematuria  NEUROLOGICAL: No numbness or weakness  SKIN: No itching, burning, rashes, or lesions   MUSCULOSKELETAL: left leg pain      INTERVAL HPI/OVERNIGHT EVENTS:  T(C): 36.2 (02-12-25 @ 18:10), Max: 36.4 (02-12-25 @ 17:23)  HR: 67 (02-12-25 @ 18:10) (66 - 67)  BP: 174/76 (02-12-25 @ 18:10) (174/76 - 185/77)  RR: 17 (02-12-25 @ 18:10) (16 - 17)  SpO2: 96% (02-12-25 @ 18:10) (95% - 96%)  Wt(kg): --  I&O's Summary      PHYSICAL EXAM:  GENERAL: NAD, well-groomed, well-developed  HEAD:  Atraumatic, Normocephalic  EYES: EOMI, PERRLA, conjunctiva and sclera clear  ENMT: No tonsillar erythema, exudates, or enlargement; Moist mucous membranes, Good dentition, No lesions  NECK: Supple, No JVD, Normal thyroid  NERVOUS SYSTEM:  Alert & Oriented X3, Good concentration; Motor Strength 5/5 B/L upper and lower extremities; DTRs 2+ intact and symmetric  CHEST/LUNG: Clear to percussion bilaterally; No rales, rhonchi, wheezing, or rubs  HEART: Regular rate and rhythm; No murmurs, rubs, or gallops  ABDOMEN: Soft, Nontender, Nondistended; Bowel sounds present  EXTREMITIES:  2+ Peripheral Pulses, No clubbing, cyanosis, or edema  LYMPH: No lymphadenopathy noted  SKIN: No rashes or lesions        LABS:                        11.4   6.02  )-----------( 134      ( 12 Feb 2025 17:59 )             34.1     02-12    138  |  105  |  60[H]  ----------------------------<  124[H]  4.2   |  20[L]  |  2.76[H]    Ca    9.9      12 Feb 2025 17:59    TPro  6.2  /  Alb  3.8  /  TBili  0.2  /  DBili  x   /  AST  21  /  ALT  15  /  AlkPhos  78  02-12    PT/INR - ( 12 Feb 2025 17:59 )   PT: 10.5 sec;   INR: 0.92 ratio         PTT - ( 12 Feb 2025 17:59 )  PTT:30.4 sec  Urinalysis Basic - ( 12 Feb 2025 17:59 )    Color: x / Appearance: x / SG: x / pH: x  Gluc: 124 mg/dL / Ketone: x  / Bili: x / Urobili: x   Blood: x / Protein: x / Nitrite: x   Leuk Esterase: x / RBC: x / WBC x   Sq Epi: x / Non Sq Epi: x / Bacteria: x      CAPILLARY BLOOD GLUCOSE            Urinalysis Basic - ( 12 Feb 2025 17:59 )    Color: x / Appearance: x / SG: x / pH: x  Gluc: 124 mg/dL / Ketone: x  / Bili: x / Urobili: x   Blood: x / Protein: x / Nitrite: x   Leuk Esterase: x / RBC: x / WBC x   Sq Epi: x / Non Sq Epi: x / Bacteria: x    EKG: NSR @ 68 similar to 3/13/21

## 2025-02-12 NOTE — ED PROVIDER NOTE - PHYSICAL EXAMINATION
GENERAL: Awake, alert, NAD  HEENT: NC/AT, moist mucous membranes, EOMI, neck range of motion intact.  LUNGS: CTAB, no wheezes or crackles.  No chest wall tenderness to palpation.  CARDIAC: RRR, no m/r/g  ABDOMEN: Soft, non tender, non distended, no rebound, no guarding  BACK: No midline spinal tenderness  EXT: Left sided logroll test found significant for pain to left hip.  Pulses 2+ PT bilateral.  NEURO: A&Ox3. Moving all extremities.  SKIN: Warm and dry. No rash.  PSYCH: Normal affect.

## 2025-02-12 NOTE — ED ADULT TRIAGE NOTE - NS ED NURSE BANDS TYPE
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  Nonbloody nonbilious, nonbloody nonmelanotic.  States that he thinks is related to her ulcer.  It is nonexertional, nonpleuritic, nonradiating.  She is able to tolerate p.o.'s now, no rash, no sick contacts, no recent travel.    On exam Trimix membranes, normal conjunctiva, soft abdomen, no CVA tenderness, heart regular rate and rhythm, lungs clear to auscultation bilaterally no lower extremity edema.    Labs notable so far for leukocytosis likely reactive from vomiting.  She has a prerenal TOBIAS likely from dehydration.  Low suspicion for ACS.  Patient is pending a CT of the abdomen to further evaluate abdominal pain.  Signed out to Dr. Tsang to reassess following results with appropriate disposition

## 2025-02-12 NOTE — PATIENT PROFILE ADULT - FALL HARM RISK - HARM RISK INTERVENTIONS

## 2025-02-12 NOTE — CONSULT NOTE ADULT - PROBLEM SELECTOR RECOMMENDATION 9
Patient scheduled for an IMN of the left hip  No contraindication to the scheduled procedure  NPO after MN  DVT and GI prophylaxis as per ortho

## 2025-02-12 NOTE — ED PROVIDER NOTE - OBJECTIVE STATEMENT
89-year-old male patient past medical history of hypertension and hyperlipidemia who presents to the emergency department after a fall.  Patient endorses he was attempting to push a fridge with wheels when he slipped and fell on his left side.  Presenting to the emergency department with left hip pain since.  EMS was called and administered 10 mg of morphine for pain control.  Patient does not recall head trauma and denies head or neck pain.  Localizes focal pain to left hip that is nonradiating and is painful with left lower extremity range of motion.

## 2025-02-12 NOTE — ED ADULT NURSE NOTE - FINAL NURSING ELECTRONIC SIGNATURE
Recommendation/Intervention for malnutrition 8/23/24):  1. Recommend modify pt's diet to Renal diet   2. Recommend pt continues Novasource renal BID until PO intake > 75%   3. Encourage PO intake   4. Weigh twice weekly  5. Collaboration by nutrition professional with other providers     Goals:   1. Pt's diet will be modified prior to RD follow up   2. Pt will tolerate and consume > 75% EEN and EPN prior to RD follow up    SANIYA Hampton, RDN, LDN    12-Feb-2025 21:21

## 2025-02-12 NOTE — PATIENT PROFILE ADULT - FALL HARM RISK - FACTORS NURSING JUDGEMENT
"[facility]    Return OB Note    CC: Return OB     Subjective:  Vianey is a 34 year old  at 33w0d   Denies vaginal bleeding, loss of fluid, or regular contractions. Good fetal movement.  Complaints today: None    Objective:  /72 (BP Location: Left arm, Patient Position: Sitting, Cuff Size: Adult Large)   Pulse 97   Temp 98.3  F (36.8  C) (Tympanic)   Resp 12   Ht 1.575 m (5' 2\")   Wt 109.3 kg (241 lb)   LMP 2021   BMI 44.08 kg/m      Fundal height: 34cm  FHT: 130bpm    Assessment/Plan:   Encounter Diagnoses   Name Primary?     Encounter for prenatal care in third trimester of first pregnancy Yes     BMI 40.0-44.9, adult (H)        IUP at 33w0d  -Low lying placenta: resolved  -Morbid obesity: serial growth US, 12/10 EFW 80%, repeat today. Plan for BPPs at 36 weeks, these are ordered.   -Mid trimester labs normal  -S/p COVID, flu, and TDap vaccines  -Strict return precautions given    RTC 2 weeks    Neeta Vargas DO"
Yes

## 2025-02-13 ENCOUNTER — TRANSCRIPTION ENCOUNTER (OUTPATIENT)
Age: 89
End: 2025-02-13

## 2025-02-13 NOTE — PRE-ANESTHESIA EVALUATION ADULT - NSANTHPMHFT_GEN_ALL_CORE
chart and consultant notes reviewed. 2021 positive stress and unremarkable tte- has been followed by cv since that time with med mgmt/no further workup or intervention. reports stable interval cv clinical status- ambulates at baseline without cp/sob. no signs active cad/decompensated chf. cri at/near baseline

## 2025-02-13 NOTE — PRE PROCEDURE NOTE - PRE PROCEDURE EVALUATION
Spiritual Care Visit    Clinical Encounter Type  Visited With: Patient not available  Routine Visit: Introduction  Continue Visiting: Yes     Uziel Ambriz                                                 Patient is proceeding to surgery today    Exam stable since H&P    Plan:  - OR today   - NPO/IVF if tolerated  - Medical Clearance  - Preop Labs: CBC/BMP/Coags/T&S  - CXR and EKG    For all questions related to patient care, please reach out to the on-call team via the pager.     Rubi Morales, PGY 3  Orthopaedic Surgery  [facility] [phone]  [facility] [phone]  [facility] [phone]

## 2025-02-13 NOTE — PRE-OP CHECKLIST - DNR CLARIFICATION FORM COMPLETED
done Bed in lowest position, wheels locked, appropriate side rails in place/Call bell, personal items and telephone in reach/Instruct patient to call for assistance before getting out of bed or chair/Non-slip footwear when patient is out of bed/[place] to call system/Physically safe environment - no spills, clutter or unnecessary equipment/Purposeful Proactive Rounding/Room/bathroom lighting operational, light cord in reach

## 2025-02-13 NOTE — PROGRESS NOTE ADULT - SUBJECTIVE AND OBJECTIVE BOX
ORTHO ATTENDING POST OP    S/P IM FIXATION    HIP  WBAT    LE  eliquis  martin  ancef 1 g x 24 h  OOB to chair in AM  rehab consult   f/u medicine  CBC in RR and AM    ORTHO ATTENDING POST OP    S/P IM FIXATION  L  HIP  WBAT  L  LE  eliquis - instead of lovenox due to renal insufficiency  venodynes  ancef 1 g x 24 h  OOB to chair in AM  rehab consult   f/u medicine  CBC in RR and AM

## 2025-02-13 NOTE — PROGRESS NOTE ADULT - SUBJECTIVE AND OBJECTIVE BOX
ORTHOPEDIC PROGRESS NOTE    Overnight events: None    SUBJECTIVE: Pt seen and examined at bedside. Patient is doing well, no acute complaints this AM. Pain is controlled with medication      OBJECTIVE:  Vital Signs Last 24 Hrs  T(C): 36.7 (12 Feb 2025 22:20), Max: 36.7 (12 Feb 2025 22:20)  T(F): 98 (12 Feb 2025 22:20), Max: 98 (12 Feb 2025 22:20)  HR: 71 (12 Feb 2025 22:20) (66 - 71)  BP: 170/70 (12 Feb 2025 22:20) (170/70 - 185/77)  BP(mean): --  RR: 18 (12 Feb 2025 22:20) (16 - 18)  SpO2: 96% (12 Feb 2025 22:20) (95% - 96%)    Parameters below as of 12 Feb 2025 22:20  Patient On (Oxygen Delivery Method): room air      Physical Examination:  GEN: NAD, resting quietly  PULM: symmetric chest rise bilaterally, no increased WOB  ABD: nondistended  EXTR:   LLE:  Skin intact, no noticeable edema or ecchymosis over L hip  +TTP over L hip, no TTP along remainder of extremity; compartments soft  Limited ROM at hip 2/2 pain  Motor: TA/EHL/GS/FHL intact  Sensory: DP/SP/Tib/Gary/Saph SILT  +DP pulse, WWP      LABS:                        9.6    7.55  )-----------( 112      ( 13 Feb 2025 01:19 )             29.2       02-13    136  |  105  |  59[H]  ----------------------------<  126[H]  4.3   |  20[L]  |  2.66[H]    Ca    9.4      13 Feb 2025 01:19    TPro  6.2  /  Alb  3.8  /  TBili  0.2  /  DBili  x   /  AST  21  /  ALT  15  /  AlkPhos  78  02-12

## 2025-02-13 NOTE — CHART NOTE - NSCHARTNOTEFT_GEN_A_CORE
Patient seen in PACU, resting in bed comfortably without complaints.  Denies chest pain, SOB, N/V.  Pre-op s/sx: L hip pain; Post-op: patient denies L hip pain currently.    Vital Signs Last 24 Hrs  T(F): 99 (13 Feb 2025 18:00), Max: 99 (13 Feb 2025 18:00)  HR: 63 (13 Feb 2025 18:15) (58 - 72)  BP: 127/60 (13 Feb 2025 18:15) (127/60 - 175/60)  RR: 15 (13 Feb 2025 18:15) (14 - 18)  SpO2: 99% (13 Feb 2025 18:15) (94% - 99%) on room air    Exam:  General -- A&O x 3, in no acute distress  Cardiac -- +S1/S2, RRR  Pulm -- Mild crackles upon auscultation in right lower lobe, otherwise CTAB in anterior and lateral fields  Abdominal -- soft, ND, NT, normoactive bowel sounds    	Dressing: clean/dry/intact Aquacel x 2 over left hip incisions  	Drains: No drain  	Sensation: intact to light touch in LLE  	Motor exam:    			PF	DF	EHL	FHL  		LLE         5/5        5/5          5/5           5/5    	Calves soft/non-tender bilaterally  	Warm well-perfused, capillary refill <3 seconds    Labs:                     8.7    7.44  )-----------( 105      ( 13 Feb 2025 17:39 )             26.5    02-13    139  |  107  |  56[H]  ----------------------------<  116[H]  4.4   |  18[L]  |  2.66[H]    Ca    9.2      13 Feb 2025 17:39    TPro  6.2  /  Alb  3.8  /  TBili  0.2  /  DBili  x   /  AST  21  /  ALT  15  /  AlkPhos  78  02-12    A/P: 89 y/o male s/p IM L Patient seen in PACU, resting in bed comfortably without complaints.  Denies chest pain, SOB, N/V.  Pre-op s/sx: L hip pain; Post-op: patient denies L hip pain currently.    Vital Signs Last 24 Hrs  T(F): 99 (13 Feb 2025 18:00), Max: 99 (13 Feb 2025 18:00)  HR: 63 (13 Feb 2025 18:15) (58 - 72)  BP: 127/60 (13 Feb 2025 18:15) (127/60 - 175/60)  RR: 15 (13 Feb 2025 18:15) (14 - 18)  SpO2: 99% (13 Feb 2025 18:15) (94% - 99%) on room air    Exam:  General -- A&O x 3, in no acute distress  Cardiac -- +S1/S2, RRR  Pulm -- Mild crackles upon auscultation in right lower lobe, otherwise CTAB in anterior and lateral fields  Abdominal -- soft, ND, NT, normoactive bowel sounds    	Dressing: clean/dry/intact Aquacel x 2 over left hip incisions  	Drains: No drain  	Sensation: intact to light touch in LLE  	Motor exam:    			PF	DF	EHL	FHL  		LLE         5/5        5/5          5/5           5/5    	Calves soft/non-tender bilaterally  	Warm well-perfused, capillary refill <3 seconds    Labs:                     8.7    7.44  )-----------( 105      ( 13 Feb 2025 17:39 )             26.5    02-13    139  |  107  |  56[H]  ----------------------------<  116[H]  4.4   |  18[L]  |  2.66[H]    Ca    9.2      13 Feb 2025 17:39    TPro  6.2  /  Alb  3.8  /  TBili  0.2  /  DBili  x   /  AST  21  /  ALT  15  /  AlkPhos  78  02-12    A/P: 89 y/o male s/p L hip ORIF with intramedullary nail for IT fx  - F/u AM CBC tomorrow  - Pain control  - POOB to chair in AM, WBAT with LLE  - DVT: SCDs, Eliquis  - PT consult    ZAKIYA Macias Patient seen in PACU, resting in bed comfortably without complaints.  Denies chest pain, SOB, N/V.  Pre-op s/sx: L hip pain; Post-op: patient denies L hip pain currently.    Vital Signs Last 24 Hrs  T(F): 99 (13 Feb 2025 18:00), Max: 99 (13 Feb 2025 18:00)  HR: 63 (13 Feb 2025 18:15) (58 - 72)  BP: 127/60 (13 Feb 2025 18:15) (127/60 - 175/60)  RR: 15 (13 Feb 2025 18:15) (14 - 18)  SpO2: 99% (13 Feb 2025 18:15) (94% - 99%) on room air    Exam:  General -- A&O x 3, in no acute distress  Cardiac -- +S1/S2, RRR  Pulm -- Mild crackles upon auscultation in right lower lobe, otherwise CTAB in anterior and lateral fields  Abdominal -- soft, ND, NT, normoactive bowel sounds    	Dressing: clean/dry/intact Aquacel x 2 over left hip incisions  	Drains: No drain  	Sensation: intact to light touch in LLE  	Motor exam:    			PF	DF	EHL	FHL  		LLE         5/5        5/5          5/5           5/5    	Calves soft/non-tender bilaterally  	Warm well-perfused, capillary refill <3 seconds    Labs:                     8.7    7.44  )-----------( 105      ( 13 Feb 2025 17:39 )             26.5    02-13    139  |  107  |  56[H]  ----------------------------<  116[H]  4.4   |  18[L]  |  2.66[H]    Ca    9.2      13 Feb 2025 17:39    TPro  6.2  /  Alb  3.8  /  TBili  0.2  /  DBili  x   /  AST  21  /  ALT  15  /  AlkPhos  78  02-12    A/P: 89 y/o male s/p L hip ORIF with intramedullary nail for IT fx  - F/u AM CBC tomorrow  - Pain control  - POOB to chair in AM, WBAT with LLE  - DVT: SCDs, Eliquis  - GI PPx: Protonix  - Ancef q8h x 2 post-op  - Physical Therapy    TU MaciasS Patient seen in PACU, resting in bed comfortably without complaints.  Denies chest pain, SOB, N/V.  Pre-op s/sx: L hip pain; Post-op: patient denies L hip pain currently.    Vital Signs Last 24 Hrs  T(F): 99 (13 Feb 2025 18:00), Max: 99 (13 Feb 2025 18:00)  HR: 63 (13 Feb 2025 18:15) (58 - 72)  BP: 127/60 (13 Feb 2025 18:15) (127/60 - 175/60)  RR: 15 (13 Feb 2025 18:15) (14 - 18)  SpO2: 99% (13 Feb 2025 18:15) (94% - 99%) on room air    Exam:  General -- A&O x 3, in no acute distress  Cardiac -- +S1/S2, RRR  Pulm -- Mild crackles upon auscultation in right lower lobe, otherwise CTAB in anterior and lateral fields  Abdominal -- soft, ND, NT, normoactive bowel sounds    	Dressing: clean/dry/intact Aquacel x 2 over left hip incisions  	Drains: No drain  	Sensation: intact to light touch in LLE  	Motor exam:    			PF	DF	EHL	FHL  		LLE         5/5        5/5          5/5           5/5    	Calves soft/non-tender bilaterally  	Warm well-perfused, capillary refill <3 seconds    Labs:                     8.7    7.44  )-----------( 105      ( 13 Feb 2025 17:39 )             26.5    02-13    139  |  107  |  56[H]  ----------------------------<  116[H]  4.4   |  18[L]  |  2.66[H]    Ca    9.2      13 Feb 2025 17:39    TPro  6.2  /  Alb  3.8  /  TBili  0.2  /  DBili  x   /  AST  21  /  ALT  15  /  AlkPhos  78  02-12    A/P: 89 y/o male s/p L hip ORIF with intramedullary nail for IT fx  - F/u AM CBC, BMP tomorrow  - Pain control  - POOB to chair in AM, WBAT with LLE  - DVT: SCDs, Eliquis  - GI PPx: Protonix  - Ancef q8h x 2 post-op  - Physical Therapy    TU MaciasS Patient seen in PACU, resting in bed comfortably without complaints.  Denies chest pain, SOB, N/V.  Pre-op s/sx: L hip pain; Post-op: patient denies L hip pain currently.    Vital Signs Last 24 Hrs  T(F): 99 (13 Feb 2025 18:00), Max: 99 (13 Feb 2025 18:00)  HR: 63 (13 Feb 2025 18:15) (58 - 72)  BP: 127/60 (13 Feb 2025 18:15) (127/60 - 175/60)  RR: 15 (13 Feb 2025 18:15) (14 - 18)  SpO2: 99% (13 Feb 2025 18:15) (94% - 99%) on room air    Exam:  General -- A&O x 3, in no acute distress  Cardiac -- +S1/S2, RRR  Pulm -- Mild crackles upon auscultation in right lower lobe, otherwise CTAB in anterior and lateral fields  Abdominal -- soft, ND, NT, normoactive bowel sounds    	Dressing: clean/dry/intact Aquacel x 2 over left hip incisions  	Drains: No drain  	Sensation: intact to light touch in LLE  	Motor exam:    			PF	DF	EHL	FHL  		LLE         5/5        5/5          5/5           5/5    	Calves soft/non-tender bilaterally  	Warm well-perfused, capillary refill <3 seconds    Labs:                     8.7    7.44  )-----------( 105      ( 13 Feb 2025 17:39 )             26.5    02-13    139  |  107  |  56[H]  ----------------------------<  116[H]  4.4   |  18[L]  |  2.66[H]    Ca    9.2      13 Feb 2025 17:39    TPro  6.2  /  Alb  3.8  /  TBili  0.2  /  DBili  x   /  AST  21  /  ALT  15  /  AlkPhos  78  02-12    A/P: 89 y/o male s/p L hip ORIF with intramedullary nail for IT fx  - F/u AM CBC, BMP tomorrow  - Pain control  - Incentive Spirometry  - OOB to chair in AM, WBAT with LLE  - DVT: SCDs, Eliquis to start tomorrow  - GI PPx: Protonix  - Ancef q8h x 2 post-op  - PT/OT: WBAT LLE  - Dispo plan: PACU to Floor    ZAKIYA Macias      Reviewed & Signed   Chava Cooper PA-C  Orthopedic Surgery Team  Team Pager [phone]- [phone]

## 2025-02-13 NOTE — PROGRESS NOTE ADULT - SUBJECTIVE AND OBJECTIVE BOX
89-year-old male patient past medical history of hypertension  CKD, and hyperlipidemia who presents to the emergency department after a fall.  Patient states he was attempting to push a fridge with wheels when he slipped and fell on his left side.  Presenting to the emergency department with left hip pain since. Patient was brought to [facility] for further evaluation and treatment. In the ED he was found to have a left hip fracture and is scheduled for an IMN. Patient seen now resting comfortably. Patient awaiting surgery       MEDICATIONS  (STANDING):  Hydralazine 100mg  TID  Lasix 10mg daily except wed and sat  Folic acid 1mg daily  Imdur 60mg daily  allopurinol 100mg daily  Metoprolol 25 Mg 7:30 Pm daily  amlodipine 10 mg 8:30 PM daily  Finasteride 5mg 8:30 PM daily  Pravastatin 20 mg Qhs  Doxazosin 2mg qhs  sodium Bicarb 650mg MWF  Cholecalciferol .25 MWF      MEDICATIONS  (PRN):          VITALS:   T(C): 36.4 (02-13-25 @ 15:20), Max: 36.7 (02-12-25 @ 22:20)  HR: 60 (02-13-25 @ 15:20) (58 - 72)  BP: 149/58 (02-13-25 @ 15:20) (149/58 - 185/77)  RR: 18 (02-13-25 @ 15:20) (16 - 18)  SpO2: 94% (02-13-25 @ 15:20) (94% - 97%)  Wt(kg): --      PHYSICAL EXAM:  GENERAL: NAD, well-groomed, well-developed  HEAD:  Atraumatic, Normocephalic  EYES: EOMI, PERRLA, conjunctiva and sclera clear  ENMT: No tonsillar erythema, exudates, or enlargement; Moist mucous membranes, Good dentition, No lesions  NECK: Supple, No JVD, Normal thyroid  NERVOUS SYSTEM:  Alert & Oriented X3, Good concentration; Motor Strength 5/5 B/L upper and lower extremities; DTRs 2+ intact and symmetric  CHEST/LUNG: Clear to percussion bilaterally; No rales, rhonchi, wheezing, or rubs  HEART: Regular rate and rhythm; No murmurs, rubs, or gallops  ABDOMEN: Soft, Nontender, Nondistended; Bowel sounds present  EXTREMITIES:  2+ Peripheral Pulses, No clubbing, cyanosis, or edema  LYMPH: No lymphadenopathy noted  SKIN: No rashes or lesions    LABS:        CBC Full  -  ( 13 Feb 2025 01:19 )  WBC Count : 7.55 K/uL  RBC Count : 3.12 M/uL  Hemoglobin : 9.6 g/dL  Hematocrit : 29.2 %  Platelet Count - Automated : 112 K/uL  Mean Cell Volume : 93.6 fl  Mean Cell Hemoglobin : 30.8 pg  Mean Cell Hemoglobin Concentration : 32.9 g/dL  Auto Neutrophil # : x  Auto Lymphocyte # : x  Auto Monocyte # : x  Auto Eosinophil # : x  Auto Basophil # : x  Auto Neutrophil % : x  Auto Lymphocyte % : x  Auto Monocyte % : x  Auto Eosinophil % : x  Auto Basophil % : x    02-13    136  |  105  |  59[H]  ----------------------------<  126[H]  4.3   |  20[L]  |  2.66[H]    Ca    9.4      13 Feb 2025 01:19    TPro  6.2  /  Alb  3.8  /  TBili  0.2  /  DBili  x   /  AST  21  /  ALT  15  /  AlkPhos  78  02-12    LIVER FUNCTIONS - ( 12 Feb 2025 17:59 )  Alb: 3.8 g/dL / Pro: 6.2 g/dL / ALK PHOS: 78 U/L / ALT: 15 U/L / AST: 21 U/L / GGT: x           PT/INR - ( 13 Feb 2025 01:19 )   PT: 12.0 sec;   INR: 1.05 ratio         PTT - ( 13 Feb 2025 01:19 )  PTT:28.7 sec  Urinalysis Basic - ( 13 Feb 2025 01:19 )    Color: x / Appearance: x / SG: x / pH: x  Gluc: 126 mg/dL / Ketone: x  / Bili: x / Urobili: x   Blood: x / Protein: x / Nitrite: x   Leuk Esterase: x / RBC: x / WBC x   Sq Epi: x / Non Sq Epi: x / Bacteria: x      CAPILLARY BLOOD GLUCOSE          RADIOLOGY & ADDITIONAL TESTS:

## 2025-02-14 ENCOUNTER — APPOINTMENT (OUTPATIENT)
Dept: INTERNAL MEDICINE | Facility: CLINIC | Age: 89
End: 2025-02-14

## 2025-02-14 NOTE — PROGRESS NOTE ADULT - SUBJECTIVE AND OBJECTIVE BOX
89-year-old male patient past medical history of hypertension  CKD, and hyperlipidemia who presents to the emergency department after a fall.  Patient states he was attempting to push a fridge with wheels when he slipped and fell on his left side.  Presenting to the emergency department with left hip pain since. Patient was brought to [facility] for further evaluation and treatment. In the ED he was found to have a left hip fracture and is scheduled for an IMN. Patient seen now resting comfortably. Patient is s/p an IMN placement. Patient tolerated he procedure well.       MEDICATIONS  (STANDING):  acetaminophen     Tablet .. 975 milliGRAM(s) Oral every 8 hours  allopurinol 100 milliGRAM(s) Oral daily  amLODIPine   Tablet 10 milliGRAM(s) Oral at bedtime  apixaban 2.5 milliGRAM(s) Oral every 12 hours  atorvastatin 10 milliGRAM(s) Oral at bedtime  doxazosin 2 milliGRAM(s) Oral at bedtime  finasteride 5 milliGRAM(s) Oral daily  folic acid 1 milliGRAM(s) Oral daily  furosemide    Tablet 10 milliGRAM(s) Oral <User Schedule>  hydrALAZINE 100 milliGRAM(s) Oral three times a day  isosorbide   mononitrate ER Tablet (IMDUR) 60 milliGRAM(s) Oral daily  metoprolol succinate ER 25 milliGRAM(s) Oral at bedtime  pantoprazole    Tablet 40 milliGRAM(s) Oral before breakfast  polyethylene glycol 3350 17 Gram(s) Oral at bedtime  senna 2 Tablet(s) Oral at bedtime  sodium bicarbonate 650 milliGRAM(s) Oral <User Schedule>    MEDICATIONS  (PRN):  oxyCODONE    IR 2.5 milliGRAM(s) Oral every 4 hours PRN Moderate Pain (4 - 6)  oxyCODONE    IR 5 milliGRAM(s) Oral every 4 hours PRN Severe Pain (7 - 10)          VITALS:   T(C): 36.7 (02-14-25 @ 17:15), Max: 37.3 (02-14-25 @ 12:01)  HR: 65 (02-14-25 @ 17:15) (53 - 75)  BP: 150/60 (02-14-25 @ 17:15) (111/50 - 157/70)  RR: 18 (02-14-25 @ 17:15) (16 - 18)  SpO2: 94% (02-14-25 @ 17:15) (92% - 98%)  Wt(kg): --    PHYSICAL EXAM:  GENERAL: NAD, well-groomed, well-developed  HEAD:  Atraumatic, Normocephalic  EYES: EOMI, PERRLA, conjunctiva and sclera clear  ENMT: No tonsillar erythema, exudates, or enlargement; Moist mucous membranes, Good dentition, No lesions  NECK: Supple, No JVD, Normal thyroid  NERVOUS SYSTEM:  Alert & Oriented X3, Good concentration; Motor Strength 5/5 B/L upper and lower extremities; DTRs 2+ intact and symmetric  CHEST/LUNG: Clear to percussion bilaterally; No rales, rhonchi, wheezing, or rubs  HEART: Regular rate and rhythm; No murmurs, rubs, or gallops  ABDOMEN: Soft, Nontender, Nondistended; Bowel sounds present  EXTREMITIES:  2+ Peripheral Pulses, No clubbing, cyanosis, or edema  LYMPH: No lymphadenopathy noted  SKIN: No rashes or lesions    LABS:        CBC Full  -  ( 14 Feb 2025 06:53 )  WBC Count : 5.75 K/uL  RBC Count : 2.43 M/uL  Hemoglobin : 7.4 g/dL  Hematocrit : 22.6 %  Platelet Count - Automated : 74 K/uL  Mean Cell Volume : 93.0 fl  Mean Cell Hemoglobin : 30.5 pg  Mean Cell Hemoglobin Concentration : 32.7 g/dL  Auto Neutrophil # : x  Auto Lymphocyte # : x  Auto Monocyte # : x  Auto Eosinophil # : x  Auto Basophil # : x  Auto Neutrophil % : x  Auto Lymphocyte % : x  Auto Monocyte % : x  Auto Eosinophil % : x  Auto Basophil % : x    02-14    137  |  106  |  60[H]  ----------------------------<  106[H]  4.1   |  20[L]  |  2.93[H]    Ca    8.7      14 Feb 2025 06:53        PT/INR - ( 13 Feb 2025 01:19 )   PT: 12.0 sec;   INR: 1.05 ratio         PTT - ( 13 Feb 2025 01:19 )  PTT:28.7 sec  Urinalysis Basic - ( 14 Feb 2025 06:53 )    Color: x / Appearance: x / SG: x / pH: x  Gluc: 106 mg/dL / Ketone: x  / Bili: x / Urobili: x   Blood: x / Protein: x / Nitrite: x   Leuk Esterase: x / RBC: x / WBC x   Sq Epi: x / Non Sq Epi: x / Bacteria: x      CAPILLARY BLOOD GLUCOSE          RADIOLOGY & ADDITIONAL TESTS:

## 2025-02-14 NOTE — CONSULT NOTE ADULT - SUBJECTIVE AND OBJECTIVE BOX
[facility] DIVISION OF KIDNEY DISEASES AND HYPERTENSION -- INITIAL CONSULT NOTE  --------------------------------------------------------------------------------  HPI: 89M with HLD, CAD, HTN, CKD 4, and secondary hyperparathyroidism who presented to [facility] after mechanical fall resulting in left hip fracture s/p IMN on 2/13/25. Nephrology consulted for CKD management.    Patient's outpt nephrologist is Dr. Tolbert, last seen on 11/15/24.         PAST HISTORY  --------------------------------------------------------------------------------  PAST MEDICAL & SURGICAL HISTORY:  HTN - Hypertension  Macular Degeneration, Bilateral  Hyperparathyroidism  Dyslipidemia  CKD 4  ORIF - left ankle 1990  Cataract - left and right  H/O rectal polypectomy 2020    FAMILY HISTORY:  No pertinent family history in first degree relatives    PAST SOCIAL HISTORY:    ALLERGIES & MEDICATIONS  --------------------------------------------------------------------------------  Allergies    No Known Allergies      Standing Inpatient Medications  acetaminophen     Tablet .. 975 milliGRAM(s) Oral every 8 hours  allopurinol 100 milliGRAM(s) Oral daily  amLODIPine   Tablet 10 milliGRAM(s) Oral at bedtime  apixaban 2.5 milliGRAM(s) Oral every 12 hours  atorvastatin 10 milliGRAM(s) Oral at bedtime  ceFAZolin   IVPB 1000 milliGRAM(s) IV Intermittent every 12 hours  doxazosin 2 milliGRAM(s) Oral at bedtime  finasteride 5 milliGRAM(s) Oral daily  furosemide    Tablet 10 milliGRAM(s) Oral <User Schedule>  hydrALAZINE 100 milliGRAM(s) Oral three times a day  isosorbide   mononitrate ER Tablet (IMDUR) 60 milliGRAM(s) Oral daily  metoprolol succinate ER 25 milliGRAM(s) Oral at bedtime  pantoprazole    Tablet 40 milliGRAM(s) Oral before breakfast  polyethylene glycol 3350 17 Gram(s) Oral at bedtime  senna 2 Tablet(s) Oral at bedtime  sodium bicarbonate 650 milliGRAM(s) Oral <User Schedule>    PRN Inpatient Medications  oxyCODONE    IR 2.5 milliGRAM(s) Oral every 4 hours PRN  oxyCODONE    IR 5 milliGRAM(s) Oral every 4 hours PRN      REVIEW OF SYSTEMS  --------------------------------------------------------------------------------      VITALS/PHYSICAL EXAM  --------------------------------------------------------------------------------  T(C): 37.1 (02-14-25 @ 08:38), Max: 37.2 (02-13-25 @ 18:00)  HR: 60 (02-14-25 @ 08:38) (53 - 75)  BP: 119/55 (02-14-25 @ 08:38) (111/50 - 172/56)  RR: 18 (02-14-25 @ 08:38) (14 - 18)  SpO2: 92% (02-14-25 @ 08:38) (92% - 99%)  Wt(kg): --  Height (cm): 162.6 (02-13-25 @ 15:20)  Weight (kg): 62.6 (02-13-25 @ 15:20)  BMI (kg/m2): 23.7 (02-13-25 @ 15:20)  BSA (m2): 1.67 (02-13-25 @ 15:20)      02-13-25 @ 07:01  -  02-14-25 @ 07:00  --------------------------------------------------------  IN: 940 mL / OUT: 1550 mL / NET: -610 mL      PHYSICAL EXAM:      LABS/STUDIES  --------------------------------------------------------------------------------              7.4    5.75  >-----------<  74       [02-14-25 @ 06:53]              22.6     137  |  106  |  60  ----------------------------<  106      [02-14-25 @ 06:53]  4.1   |  20  |  2.93        Ca     8.7     [02-14-25 @ 06:53]    TPro  6.2  /  Alb  3.8  /  TBili  0.2  /  DBili  x   /  AST  21  /  ALT  15  /  AlkPhos  78  [02-12-25 @ 17:59]    PT/INR: PT 12.0 , INR 1.05       [02-13-25 @ 01:19]  PTT: 28.7       [02-13-25 @ 01:19]      Creatinine Trend:  SCr 2.93 [02-14 @ 06:53]  SCr 2.66 [02-13 @ 17:39]  SCr 2.66 [02-13 @ 01:19]  SCr 2.76 [02-12 @ 17:59] [facility] DIVISION OF KIDNEY DISEASES AND HYPERTENSION -- INITIAL CONSULT NOTE  --------------------------------------------------------------------------------  HPI: 89M with HLD, CAD, HTN, CKD 4, and secondary hyperparathyroidism who presented to [facility] after mechanical fall resulting in left hip fracture s/p IMN on 2/13/25. Nephrology consulted for CKD management.    Patient's outpt nephrologist is Dr. Tolbert, last seen on 11/15/24. Baseline Cr ~2.4-2.9 mg/dL.    Pt seen and examined at bedside. Wife also present. He has some hip pain, but is otherwise doing well. He is doing incentive spirometry regularly. ROS negative, see below.    PAST HISTORY  --------------------------------------------------------------------------------  PAST MEDICAL & SURGICAL HISTORY:  HTN - Hypertension  Macular Degeneration, Bilateral  Hyperparathyroidism  Dyslipidemia  CKD 4  ORIF - left ankle 1990  Cataract - left and right  H/O rectal polypectomy 2020    FAMILY HISTORY:  No pertinent family history in first degree relatives    PAST SOCIAL HISTORY:    ALLERGIES & MEDICATIONS  --------------------------------------------------------------------------------  Allergies    No Known Allergies      Standing Inpatient Medications  acetaminophen     Tablet .. 975 milliGRAM(s) Oral every 8 hours  allopurinol 100 milliGRAM(s) Oral daily  amLODIPine   Tablet 10 milliGRAM(s) Oral at bedtime  apixaban 2.5 milliGRAM(s) Oral every 12 hours  atorvastatin 10 milliGRAM(s) Oral at bedtime  ceFAZolin   IVPB 1000 milliGRAM(s) IV Intermittent every 12 hours  doxazosin 2 milliGRAM(s) Oral at bedtime  finasteride 5 milliGRAM(s) Oral daily  furosemide    Tablet 10 milliGRAM(s) Oral <User Schedule>  hydrALAZINE 100 milliGRAM(s) Oral three times a day  isosorbide   mononitrate ER Tablet (IMDUR) 60 milliGRAM(s) Oral daily  metoprolol succinate ER 25 milliGRAM(s) Oral at bedtime  pantoprazole    Tablet 40 milliGRAM(s) Oral before breakfast  polyethylene glycol 3350 17 Gram(s) Oral at bedtime  senna 2 Tablet(s) Oral at bedtime  sodium bicarbonate 650 milliGRAM(s) Oral <User Schedule>    PRN Inpatient Medications  oxyCODONE    IR 2.5 milliGRAM(s) Oral every 4 hours PRN  oxyCODONE    IR 5 milliGRAM(s) Oral every 4 hours PRN      REVIEW OF SYSTEMS  --------------------------------------------------------------------------------      VITALS/PHYSICAL EXAM  --------------------------------------------------------------------------------  T(C): 37.1 (02-14-25 @ 08:38), Max: 37.2 (02-13-25 @ 18:00)  HR: 60 (02-14-25 @ 08:38) (53 - 75)  BP: 119/55 (02-14-25 @ 08:38) (111/50 - 172/56)  RR: 18 (02-14-25 @ 08:38) (14 - 18)  SpO2: 92% (02-14-25 @ 08:38) (92% - 99%)  Wt(kg): --  Height (cm): 162.6 (02-13-25 @ 15:20)  Weight (kg): 62.6 (02-13-25 @ 15:20)  BMI (kg/m2): 23.7 (02-13-25 @ 15:20)  BSA (m2): 1.67 (02-13-25 @ 15:20)      02-13-25 @ 07:01  -  02-14-25 @ 07:00  --------------------------------------------------------  IN: 940 mL / OUT: 1550 mL / NET: -610 mL      PHYSICAL EXAM:      LABS/STUDIES  --------------------------------------------------------------------------------              7.4    5.75  >-----------<  74       [02-14-25 @ 06:53]              22.6     137  |  106  |  60  ----------------------------<  106      [02-14-25 @ 06:53]  4.1   |  20  |  2.93        Ca     8.7     [02-14-25 @ 06:53]    TPro  6.2  /  Alb  3.8  /  TBili  0.2  /  DBili  x   /  AST  21  /  ALT  15  /  AlkPhos  78  [02-12-25 @ 17:59]    PT/INR: PT 12.0 , INR 1.05       [02-13-25 @ 01:19]  PTT: 28.7       [02-13-25 @ 01:19]      Creatinine Trend:  SCr 2.93 [02-14 @ 06:53]  SCr 2.66 [02-13 @ 17:39]  SCr 2.66 [02-13 @ 01:19]  SCr 2.76 [02-12 @ 17:59]

## 2025-02-14 NOTE — PHYSICAL THERAPY INITIAL EVALUATION ADULT - NSPTDISCHREC_GEN_A_CORE
If pt d/c home would require home PT, assist with all mobility, & DME: RW polyfly w/c, & 3:1 commode./Sub-acute Rehab

## 2025-02-14 NOTE — PROGRESS NOTE ADULT - SUBJECTIVE AND OBJECTIVE BOX
ORTHOPEDIC PROGRESS NOTE    Overnight events: None    SUBJECTIVE: Pt seen and examined at bedside. Patient is doing well, no acute complaints this AM. Pain is controlled with medication    OBJECTIVE:  Vital Signs Last 24 Hrs  T(C): 37 (14 Feb 2025 04:28), Max: 37.2 (13 Feb 2025 18:00)  T(F): 98.6 (14 Feb 2025 04:28), Max: 99 (13 Feb 2025 18:00)  HR: 66 (14 Feb 2025 04:28) (53 - 75)  BP: 155/65 (14 Feb 2025 04:28) (111/50 - 175/60)  BP(mean): 91 (13 Feb 2025 19:30) (86 - 102)  RR: 17 (14 Feb 2025 04:28) (14 - 18)  SpO2: 93% (14 Feb 2025 04:28) (92% - 99%)    Parameters below as of 14 Feb 2025 04:28  Patient On (Oxygen Delivery Method): room air          02-12-25 @ 07:01  -  02-13-25 @ 07:00  --------------------------------------------------------  IN: 350 mL / OUT: 200 mL / NET: 150 mL    02-13-25 @ 07:01  -  02-14-25 @ 06:17  --------------------------------------------------------  IN: 940 mL / OUT: 1550 mL / NET: -610 mL        Physical Examination:  GEN: NAD, resting quietly  PULM: symmetric chest rise bilaterally, no increased WOB  ABD: nondistended    EXTR:     Left Lower Extremity:  Dressing small amounts of strikethrough dry intact  5/5 EHL/FHL/TA/GS   SILT [place]ing/Saph/Tib/DP/SP  +DP/PT Pulses  Compartments soft  No calf TTP B/L    LABS:                        8.7    7.44  )-----------( 105      ( 13 Feb 2025 17:39 )             26.5       02-13    139  |  107  |  56[H]  ----------------------------<  116[H]  4.4   |  18[L]  |  2.66[H]    Ca    9.2      13 Feb 2025 17:39    TPro  6.2  /  Alb  3.8  /  TBili  0.2  /  DBili  x   /  AST  21  /  ALT  15  /  AlkPhos  78  02-12

## 2025-02-14 NOTE — CONSULT NOTE ADULT - ASSESSMENT
89M with HLD, CAD, HTN, CKD 4, and secondary hyperparathyroidism who presented to [facility] after mechanical fall resulting in left hip fracture s/p IMN on 2/13/25. Nephrology consulted for CKD management.    #CKD 4 - stable  - baseline Cr ~2.4-2.9 mg/dL. Cr at baseline here  - avoid nephrotoxic meds and dose meds per eGFR  - renal diet    #HTN  - has resistant HTN but post-op his BPs have dropped a bit  - continue current meds, but would not be too aggressive in trying to lower his SBP below 150s      Francine Mars, PGY-5  Nephrology Fellow  MS TEAMS preferred  (After 5pm or on weekends, please contact the fellow on call).  89M with HLD, CAD, HTN, CKD 4, and secondary hyperparathyroidism who presented to [facility] after mechanical fall resulting in left hip fracture s/p IMN on 2/13/25. Nephrology consulted for CKD management.    #CKD 4 - stable  - baseline Cr ~2.4-2.9 mg/dL. Cr increased to upper limit of baseline here likely due to relative hypotension post-op  - Pt to get 1u PRBC today, which will help  - avoid nephrotoxic meds and dose meds per eGFR  - renal diet    #HTN  - has resistant HTN but post-op his BPs have dropped a bit  - continue current meds, but would not be too aggressive in trying to lower his SBP below 150s      Francine Mars, PGY-5  Nephrology Fellow  MS TEAMS preferred  (After 5pm or on weekends, please contact the fellow on call).

## 2025-02-14 NOTE — PHYSICAL THERAPY INITIAL EVALUATION ADULT - PERTINENT HX OF CURRENT PROBLEM, REHAB EVAL
89-year-old male patient past medical history of hypertension  CKD, and hyperlipidemia who presents to the emergency department after a fall.  Patient states he was attempting to push a fridge with wheels when he slipped and fell on his left side.  Presenting to the emergency department with left hip pain since. Patient was brought to [facility] for further evaluation and treatment. In the ED he was found to have a left hip fracture and is scheduled for an IMN. Patient seen now resting comfortably. Patient is s/p an IMN placement. Patient tolerated he procedure well.

## 2025-02-14 NOTE — CONSULT NOTE ADULT - ATTENDING COMMENTS
Patient seen and examined with fellow, agree with A/P as above.  s/p PRBCs today which will a/w renal perfusion.  BPs trending lower postoperatively, may need to adjust medications for goal SBP ~120-140.

## 2025-02-14 NOTE — PHYSICAL THERAPY INITIAL EVALUATION ADULT - ADDITIONAL COMMENTS
PTA pt was independent with functional mobility and ADLs. Pt lives in a  with his wife 2-3 steps to enter 1st floor setup inside.

## 2025-02-14 NOTE — PHYSICAL THERAPY INITIAL EVALUATION ADULT - PHYSICAL ASSIST/NONPHYSICAL ASSIST: SUPINE/SIT, REHAB EVAL
verbal cues/nonverbal cues (demo/gestures)/1 person assist Anemia of chronic illness with stable hgb 7.6  Monitor serial CBC  Transfuse PRBC per primary team  Monitor hgb/Hct

## 2025-02-15 NOTE — OCCUPATIONAL THERAPY INITIAL EVALUATION ADULT - DIAGNOSIS, OT EVAL
Pt presents with pain, decreased ROM, strength, endurance, & balance, all impacting ability to perform ADLs and functional mobility.

## 2025-02-15 NOTE — OCCUPATIONAL THERAPY INITIAL EVALUATION ADULT - PERTINENT HX OF CURRENT PROBLEM, REHAB EVAL
89-year-old male patient past medical history of hypertension  CKD, and hyperlipidemia who presents to the emergency department after a fall.  Patient states he was attempting to push a fridge with wheels when he slipped and fell on his left side.  Presenting to the emergency department with left hip pain since. Patient was brought to [facility] for further evaluation and treatment. In the ED he was found to have a left hip fracture. Now s/p L hip IMN 2/13/25.

## 2025-02-15 NOTE — PROGRESS NOTE ADULT - SUBJECTIVE AND OBJECTIVE BOX
89-year-old male patient past medical history of hypertension  CKD, and hyperlipidemia who presents to the emergency department after a fall.  Patient states he was attempting to push a fridge with wheels when he slipped and fell on his left side.  Presenting to the emergency department with left hip pain since. Patient was brought to [facility] for further evaluation and treatment. In the ED he was found to have a left hip fracture and is scheduled for an IMN. Patient seen now resting comfortably. Patient is s/p an IMN placement. Patient tolerated he procedure well.       MEDICATIONS  (STANDING):  acetaminophen     Tablet .. 975 milliGRAM(s) Oral every 8 hours  allopurinol 100 milliGRAM(s) Oral daily  amLODIPine   Tablet 10 milliGRAM(s) Oral at bedtime  apixaban 2.5 milliGRAM(s) Oral every 12 hours  atorvastatin 10 milliGRAM(s) Oral at bedtime  doxazosin 2 milliGRAM(s) Oral at bedtime  finasteride 5 milliGRAM(s) Oral daily  folic acid 1 milliGRAM(s) Oral daily  furosemide    Tablet 10 milliGRAM(s) Oral <User Schedule>  hydrALAZINE 100 milliGRAM(s) Oral three times a day  isosorbide   mononitrate ER Tablet (IMDUR) 60 milliGRAM(s) Oral daily  metoprolol succinate ER 25 milliGRAM(s) Oral at bedtime  pantoprazole    Tablet 40 milliGRAM(s) Oral before breakfast  polyethylene glycol 3350 17 Gram(s) Oral at bedtime  senna 2 Tablet(s) Oral at bedtime  sodium bicarbonate 650 milliGRAM(s) Oral <User Schedule>  sodium chloride 0.9%. 1000 milliLiter(s) (75 mL/Hr) IV Continuous <Continuous>    MEDICATIONS  (PRN):  oxyCODONE    IR 2.5 milliGRAM(s) Oral every 4 hours PRN Moderate Pain (4 - 6)  oxyCODONE    IR 5 milliGRAM(s) Oral every 4 hours PRN Severe Pain (7 - 10)          VITALS:   T(C): 36.4 (02-15-25 @ 08:32), Max: 37.3 (02-14-25 @ 12:01)  HR: 61 (02-15-25 @ 10:11) (57 - 68)  BP: 123/58 (02-15-25 @ 10:11) (115/51 - 160/69)  RR: 18 (02-15-25 @ 08:32) (18 - 18)  SpO2: 94% (02-15-25 @ 10:11) (92% - 95%)  Wt(kg): --    PHYSICAL EXAM:  GENERAL: NAD, well-groomed, well-developed  HEAD:  Atraumatic, Normocephalic  EYES: EOMI, PERRLA, conjunctiva and sclera clear  ENMT: No tonsillar erythema, exudates, or enlargement; Moist mucous membranes, Good dentition, No lesions  NECK: Supple, No JVD, Normal thyroid  NERVOUS SYSTEM:  Alert & Oriented X3, Good concentration; Motor Strength 5/5 B/L upper and lower extremities; DTRs 2+ intact and symmetric  CHEST/LUNG: Clear to percussion bilaterally; No rales, rhonchi, wheezing, or rubs  HEART: Regular rate and rhythm; No murmurs, rubs, or gallops  ABDOMEN: Soft, Nontender, Nondistended; Bowel sounds present  EXTREMITIES:  2+ Peripheral Pulses, No clubbing, cyanosis, or edema  LYMPH: No lymphadenopathy noted  SKIN: No rashes or lesions    LABS:        CBC Full  -  ( 15 Feb 2025 05:31 )  WBC Count : 7.10 K/uL  RBC Count : 2.83 M/uL  Hemoglobin : 8.7 g/dL  Hematocrit : 25.9 %  Platelet Count - Automated : 83 K/uL  Mean Cell Volume : 91.5 fl  Mean Cell Hemoglobin : 30.7 pg  Mean Cell Hemoglobin Concentration : 33.6 g/dL  Auto Neutrophil # : x  Auto Lymphocyte # : x  Auto Monocyte # : x  Auto Eosinophil # : x  Auto Basophil # : x  Auto Neutrophil % : x  Auto Lymphocyte % : x  Auto Monocyte % : x  Auto Eosinophil % : x  Auto Basophil % : x    02-15    138  |  107  |  63[H]  ----------------------------<  112[H]  4.1   |  19[L]  |  2.97[H]    Ca    9.3      15 Feb 2025 05:31    TPro  5.0[L]  /  Alb  2.9[L]  /  TBili  0.3  /  DBili  x   /  AST  23  /  ALT  6[L]  /  AlkPhos  59  02-15    LIVER FUNCTIONS - ( 15 Feb 2025 05:31 )  Alb: 2.9 g/dL / Pro: 5.0 g/dL / ALK PHOS: 59 U/L / ALT: 6 U/L / AST: 23 U/L / GGT: x             Urinalysis Basic - ( 15 Feb 2025 05:31 )    Color: x / Appearance: x / SG: x / pH: x  Gluc: 112 mg/dL / Ketone: x  / Bili: x / Urobili: x   Blood: x / Protein: x / Nitrite: x   Leuk Esterase: x / RBC: x / WBC x   Sq Epi: x / Non Sq Epi: x / Bacteria: x      CAPILLARY BLOOD GLUCOSE          RADIOLOGY & ADDITIONAL TESTS:

## 2025-02-15 NOTE — PROGRESS NOTE ADULT - SUBJECTIVE AND OBJECTIVE BOX
Patient is a 89y old  Male who presents with a chief complaint of L IT hip Fracture  Patient s/p left hip fracture fixation with IM nail POD#2   Patient comfortable  No complaints    T(C): 36.4 (02-15-25 @ 08:32), Max: 37.3 (02-14-25 @ 12:01)  HR: 62 (02-15-25 @ 08:32) (57 - 68)  BP: 138/52 (02-15-25 @ 08:32) (115/51 - 160/69)  RR: 18 (02-15-25 @ 08:32) (18 - 18)  SpO2: 92% (02-15-25 @ 08:32) (92% - 95%)      PHYSICAL EXAM:  NAD, Alert  [ Left] Hip: Dressings C/D/I; sensation grossly intact to light touch; (+) DF/PF; (+) Distal Pulses; No Calf tenderness B/L, PAS     LABS                     8.7    7.10  )-----------( 83       ( 15 Feb 2025 05:31 )             25.9   02-15    138  |  107  |  63[H]  ----------------------------<  112[H]  4.1   |  19[L]  |  2.97[H]    Ca    9.3      15 Feb 2025 05:31  TPro  5.0[L]  /  Alb  2.9[L]  /  TBili  0.3  /  DBili  x   /  AST  23  /  ALT  6[L]  /  AlkPhos  59  02-15

## 2025-02-16 NOTE — PROGRESS NOTE ADULT - SUBJECTIVE AND OBJECTIVE BOX
89-year-old male patient past medical history of hypertension  CKD, and hyperlipidemia who presents to the emergency department after a fall.  Patient states he was attempting to push a fridge with wheels when he slipped and fell on his left side.  Presenting to the emergency department with left hip pain since. Patient was brought to [facility] for further evaluation and treatment. In the ED he was found to have a left hip fracture and is s/p an IMN. Patient seen now resting comfortably. Patient tolerated he procedure well. Patient has started working with physical therapy       MEDICATIONS  (STANDING):  acetaminophen     Tablet .. 975 milliGRAM(s) Oral every 8 hours  allopurinol 100 milliGRAM(s) Oral daily  amLODIPine   Tablet 10 milliGRAM(s) Oral at bedtime  apixaban 2.5 milliGRAM(s) Oral every 12 hours  atorvastatin 10 milliGRAM(s) Oral at bedtime  doxazosin 2 milliGRAM(s) Oral at bedtime  finasteride 5 milliGRAM(s) Oral daily  folic acid 1 milliGRAM(s) Oral daily  furosemide    Tablet 10 milliGRAM(s) Oral <User Schedule>  hydrALAZINE 100 milliGRAM(s) Oral three times a day  isosorbide   mononitrate ER Tablet (IMDUR) 60 milliGRAM(s) Oral daily  metoprolol succinate ER 25 milliGRAM(s) Oral at bedtime  pantoprazole    Tablet 40 milliGRAM(s) Oral before breakfast  polyethylene glycol 3350 17 Gram(s) Oral at bedtime  senna 2 Tablet(s) Oral at bedtime  sodium bicarbonate 650 milliGRAM(s) Oral <User Schedule>  sodium chloride 0.9%. 1000 milliLiter(s) (75 mL/Hr) IV Continuous <Continuous>    MEDICATIONS  (PRN):  oxyCODONE    IR 2.5 milliGRAM(s) Oral every 4 hours PRN Moderate Pain (4 - 6)  oxyCODONE    IR 5 milliGRAM(s) Oral every 4 hours PRN Severe Pain (7 - 10)          VITALS:   T(C): 36.8 (02-16-25 @ 11:44), Max: 36.9 (02-16-25 @ 08:04)  HR: 66 (02-16-25 @ 11:44) (58 - 73)  BP: 131/68 (02-16-25 @ 11:44) (112/51 - 137/60)  RR: 18 (02-16-25 @ 11:44) (18 - 18)  SpO2: 94% (02-16-25 @ 11:44) (90% - 94%)  Wt(kg): --    PHYSICAL EXAM:  GENERAL: NAD, well-groomed, well-developed  HEAD:  Atraumatic, Normocephalic  EYES: EOMI, PERRLA, conjunctiva and sclera clear  ENMT: No tonsillar erythema, exudates, or enlargement; Moist mucous membranes, Good dentition, No lesions  NECK: Supple, No JVD, Normal thyroid  NERVOUS SYSTEM:  Alert & Oriented X3, Good concentration; Motor Strength 5/5 B/L upper and lower extremities; DTRs 2+ intact and symmetric  CHEST/LUNG: Clear to percussion bilaterally; No rales, rhonchi, wheezing, or rubs  HEART: Regular rate and rhythm; No murmurs, rubs, or gallops  ABDOMEN: Soft, Nontender, Nondistended; Bowel sounds present  EXTREMITIES:  2+ Peripheral Pulses, No clubbing, cyanosis, or edema  LYMPH: No lymphadenopathy noted  SKIN: No rashes or lesions    LABS:        CBC Full  -  ( 15 Feb 2025 05:31 )  WBC Count : 7.10 K/uL  RBC Count : 2.83 M/uL  Hemoglobin : 8.7 g/dL  Hematocrit : 25.9 %  Platelet Count - Automated : 83 K/uL  Mean Cell Volume : 91.5 fl  Mean Cell Hemoglobin : 30.7 pg  Mean Cell Hemoglobin Concentration : 33.6 g/dL  Auto Neutrophil # : x  Auto Lymphocyte # : x  Auto Monocyte # : x  Auto Eosinophil # : x  Auto Basophil # : x  Auto Neutrophil % : x  Auto Lymphocyte % : x  Auto Monocyte % : x  Auto Eosinophil % : x  Auto Basophil % : x    02-15    138  |  107  |  63[H]  ----------------------------<  112[H]  4.1   |  19[L]  |  2.97[H]    Ca    9.3      15 Feb 2025 05:31    TPro  5.0[L]  /  Alb  2.9[L]  /  TBili  0.3  /  DBili  x   /  AST  23  /  ALT  6[L]  /  AlkPhos  59  02-15    LIVER FUNCTIONS - ( 15 Feb 2025 05:31 )  Alb: 2.9 g/dL / Pro: 5.0 g/dL / ALK PHOS: 59 U/L / ALT: 6 U/L / AST: 23 U/L / GGT: x             Urinalysis Basic - ( 15 Feb 2025 05:31 )    Color: x / Appearance: x / SG: x / pH: x  Gluc: 112 mg/dL / Ketone: x  / Bili: x / Urobili: x   Blood: x / Protein: x / Nitrite: x   Leuk Esterase: x / RBC: x / WBC x   Sq Epi: x / Non Sq Epi: x / Bacteria: x      CAPILLARY BLOOD GLUCOSE          RADIOLOGY & ADDITIONAL TESTS:

## 2025-02-16 NOTE — PROGRESS NOTE ADULT - SUBJECTIVE AND OBJECTIVE BOX
Post op Day [3]    Patient resting without complaints.  No chest pain, SOB, N/V.    T(C): 36.9 (02-16-25 @ 08:04), Max: 36.9 (02-16-25 @ 08:04)  HR: 68 (02-16-25 @ 08:04) (58 - 73)  BP: 112/51 (02-16-25 @ 08:04) (112/51 - 137/60)  RR: 18 (02-16-25 @ 08:04) (18 - 18)  SpO2: 91% (02-16-25 @ 08:04) (90% - 93%)  Wt(kg): --    Exam:  Alert and Oriented, No Acute Distress  Cardiac: Normal S1 & S2, RRR, No murmurs, rubs or gallops appreciated.  Pulmonary: 18bpm, normal breathing effort, no retractions, diminished lung sounds appreciated.  Bronchial/Vesicular lungs sounds appreciated throughout all lung lobes.  Lower Extremities: L Hip  Dressing: Aquacel x 2 serosanguinous saturation.    Calves Soft, Non-tender bilaterally  +PF/DF/EHL/FHL  SILT  +DP Pulse                              8.7    7.10  )-----------( 83       ( 15 Feb 2025 05:31 )             25.9    02-15    138  |  107  |  63[H]  ----------------------------<  112[H]  4.1   |  19[L]  |  2.97[H]    Ca    9.3      15 Feb 2025 05:31    TPro  5.0[L]  /  Alb  2.9[L]  /  TBili  0.3  /  DBili  x   /  AST  23  /  ALT  6[L]  /  AlkPhos  59  02-15

## 2025-02-17 ENCOUNTER — TRANSCRIPTION ENCOUNTER (OUTPATIENT)
Age: 89
End: 2025-02-17

## 2025-02-17 NOTE — PROGRESS NOTE ADULT - SUBJECTIVE AND OBJECTIVE BOX
89-year-old male patient past medical history of hypertension  CKD, and hyperlipidemia who presents to the emergency department after a fall.  Patient states he was attempting to push a fridge with wheels when he slipped and fell on his left side.  Presenting to the emergency department with left hip pain since. Patient was brought to [facility] for further evaluation and treatment. In the ED he was found to have a left hip fracture and is s/p an IMN. Patient seen now resting comfortably. Patient tolerated he procedure well. Patient has started working with physical therapy     MEDICATIONS  (STANDING):  acetaminophen     Tablet .. 975 milliGRAM(s) Oral every 8 hours  allopurinol 100 milliGRAM(s) Oral daily  amLODIPine   Tablet 10 milliGRAM(s) Oral at bedtime  apixaban 2.5 milliGRAM(s) Oral every 12 hours  atorvastatin 10 milliGRAM(s) Oral at bedtime  doxazosin 2 milliGRAM(s) Oral at bedtime  finasteride 5 milliGRAM(s) Oral daily  folic acid 1 milliGRAM(s) Oral daily  hydrALAZINE 100 milliGRAM(s) Oral three times a day  isosorbide   mononitrate ER Tablet (IMDUR) 60 milliGRAM(s) Oral daily  metoprolol succinate ER 25 milliGRAM(s) Oral at bedtime  pantoprazole    Tablet 40 milliGRAM(s) Oral before breakfast  polyethylene glycol 3350 17 Gram(s) Oral at bedtime  senna 2 Tablet(s) Oral at bedtime  sodium bicarbonate 650 milliGRAM(s) Oral <User Schedule>  sodium chloride 0.9%. 1000 milliLiter(s) (60 mL/Hr) IV Continuous <Continuous>    MEDICATIONS  (PRN):  oxyCODONE    IR 2.5 milliGRAM(s) Oral every 4 hours PRN Moderate Pain (4 - 6)  oxyCODONE    IR 5 milliGRAM(s) Oral every 4 hours PRN Severe Pain (7 - 10)          VITALS:   T(C): 36.4 (02-17-25 @ 16:24), Max: 37.1 (02-16-25 @ 20:30)  HR: 61 (02-17-25 @ 16:24) (58 - 70)  BP: 108/50 (02-17-25 @ 16:24) (108/50 - 147/69)  RR: 18 (02-17-25 @ 16:24) (18 - 18)  SpO2: 96% (02-17-25 @ 16:24) (92% - 97%)  Wt(kg): --    PHYSICAL EXAM:  GENERAL: NAD, well-groomed, well-developed  HEAD:  Atraumatic, Normocephalic  EYES: EOMI, PERRLA, conjunctiva and sclera clear  ENMT: No tonsillar erythema, exudates, or enlargement; Moist mucous membranes, Good dentition, No lesions  NECK: Supple, No JVD, Normal thyroid  NERVOUS SYSTEM:  Alert & Oriented X3, Good concentration; Motor Strength 5/5 B/L upper and lower extremities; DTRs 2+ intact and symmetric  CHEST/LUNG: Clear to percussion bilaterally; No rales, rhonchi, wheezing, or rubs  HEART: Regular rate and rhythm; No murmurs, rubs, or gallops  ABDOMEN: Soft, Nontender, Nondistended; Bowel sounds present  EXTREMITIES:  2+ Peripheral Pulses, No clubbing, cyanosis, or edema  LYMPH: No lymphadenopathy noted  SKIN: No rashes or lesions    LABS:        CBC Full  -  ( 17 Feb 2025 08:54 )  WBC Count : 4.85 K/uL  RBC Count : 2.69 M/uL  Hemoglobin : 8.1 g/dL  Hematocrit : 24.8 %  Platelet Count - Automated : 126 K/uL  Mean Cell Volume : 92.2 fl  Mean Cell Hemoglobin : 30.1 pg  Mean Cell Hemoglobin Concentration : 32.7 g/dL  Auto Neutrophil # : x  Auto Lymphocyte # : x  Auto Monocyte # : x  Auto Eosinophil # : x  Auto Basophil # : x  Auto Neutrophil % : x  Auto Lymphocyte % : x  Auto Monocyte % : x  Auto Eosinophil % : x  Auto Basophil % : x    02-17    139  |  107  |  70[H]  ----------------------------<  105[H]  4.2   |  20[L]  |  3.35[H]    Ca    9.2      17 Feb 2025 07:23          Urinalysis Basic - ( 17 Feb 2025 07:23 )    Color: x / Appearance: x / SG: x / pH: x  Gluc: 105 mg/dL / Ketone: x  / Bili: x / Urobili: x   Blood: x / Protein: x / Nitrite: x   Leuk Esterase: x / RBC: x / WBC x   Sq Epi: x / Non Sq Epi: x / Bacteria: x      CAPILLARY BLOOD GLUCOSE          RADIOLOGY & ADDITIONAL TESTS:

## 2025-02-17 NOTE — PROGRESS NOTE ADULT - ATTENDING COMMENTS
L IT fx. For IMN. R/B/A discussed
PT. DVT ppx. f/u labs. f/u medicine. DC planning
Patient seen and examined with fellow, agree with A/P as above with following addendum:    Given that plan is for gentle hydration today, would hold lasix for now  case d/w primary team ACP in person and via TEAMS    Nephrohospitalist Service to cover beginning 02/18/2025.

## 2025-02-17 NOTE — DISCHARGE NOTE PROVIDER - HOSPITAL COURSE
History of present illness:  89yMale w/ PMH of  c/o L hip pain s/p mechanical fall while moving a fridge today. Unable to bear weight in the LLE since the fall. Denies headstrike or LOC. Denies numbness/tingling in the LLE. Denies any other trauma/injuries at this time. At baseline, community ambulator occasionally a cane.    ALLERGIES:       No Known Allergies    Past Medical  History:  HTN - Hypertension  Macular Degeneration Bilateral  Hyperparathyroidism  Dyslipidemia  Chronic renal insufficiency  creatinine: 2.63    Past Surgical History:  ORIF  left ankle 1990  Cataract - left  and right  H/O rectal polypectomy  2020 2/12/25: Patient presents to the hospital with complaint of left hip pain after a fall, x-ray shows left hip fracture.  Patient was admitted and medically cleared for surgery.  2/13/25: Patient was taken to surgery and underwent a left hip fixation with an IM Nail-  tolerated procedure without complications.  Patient was transfused PRBC for acute blood loss anemia, 2nd to fracture and surgery.  Patient was evaluated by Physical therapy who recommended rehab for discharge plan.  Patient is stable for discharge when bed is available.       History of present illness:  89yMale w/ PMH of  c/o L hip pain s/p mechanical fall while moving a fridge today. Unable to bear weight in the LLE since the fall. Denies headstrike or LOC. Denies numbness/tingling in the LLE. Denies any other trauma/injuries at this time. At baseline, community ambulator occasionally a cane.    ALLERGIES:       No Known Allergies    Past Medical  History:  HTN - Hypertension  Macular Degeneration Bilateral  Hyperparathyroidism  Dyslipidemia  Chronic renal insufficiency  creatinine: 2.63    Past Surgical History:  ORIF  left ankle 1990  Cataract - left  and right  H/O rectal polypectomy  2020 2/12/25: Patient presents to the hospital with complaint of left hip pain after a fall, x-ray shows left hip fracture.  Patient was admitted and medically cleared for surgery.  2/13/25: Patient was taken to surgery and underwent a left hip fixation with an IM Nail-  tolerated procedure without complications.  Patient was transfused PRBC' s for acute blood loss anemia, 2nd to fracture and surgery.  H/O CKD admissioncr- 2.66,  cr- 3.22 2/19, may needed diuretics adjusted per nephrology.  Patient with mod drainage, Eliquis held 2/18 and/2/19, restart 2/20 per Dr. Torres.  Patient was evaluated by Physical therapy who recommended rehab for discharge plan.  Patient is stable for discharge when bed is available.

## 2025-02-17 NOTE — PROGRESS NOTE ADULT - SUBJECTIVE AND OBJECTIVE BOX
[facility] Division of Kidney Diseases & Hypertension  FOLLOW UP NOTE  [phone]--------------------------------------------------------------------------------  Chief Complaint: YESSICA on CKD     24 hour events/subjective: Pt seen and evaluated this morning. Reports feeling well, had a good BM this morning and urinating w/o issues. Denies any headaches, nausea, vomiting, fevers/chills, chest pain, SOB, abdominal pain, and leg swelling.    PAST HISTORY  --------------------------------------------------------------------------------  No significant changes to PMH, PSH, FHx, SHx, unless otherwise noted    ALLERGIES & MEDICATIONS  --------------------------------------------------------------------------------  Allergies    No Known Allergies    Intolerances    Standing Inpatient Medications  acetaminophen     Tablet .. 975 milliGRAM(s) Oral every 8 hours  allopurinol 100 milliGRAM(s) Oral daily  amLODIPine   Tablet 10 milliGRAM(s) Oral at bedtime  apixaban 2.5 milliGRAM(s) Oral every 12 hours  atorvastatin 10 milliGRAM(s) Oral at bedtime  doxazosin 2 milliGRAM(s) Oral at bedtime  finasteride 5 milliGRAM(s) Oral daily  folic acid 1 milliGRAM(s) Oral daily  furosemide    Tablet 10 milliGRAM(s) Oral <User Schedule>  hydrALAZINE 100 milliGRAM(s) Oral three times a day  isosorbide   mononitrate ER Tablet (IMDUR) 60 milliGRAM(s) Oral daily  metoprolol succinate ER 25 milliGRAM(s) Oral at bedtime  pantoprazole    Tablet 40 milliGRAM(s) Oral before breakfast  polyethylene glycol 3350 17 Gram(s) Oral at bedtime  senna 2 Tablet(s) Oral at bedtime  sodium bicarbonate 650 milliGRAM(s) Oral <User Schedule>  sodium chloride 0.9%. 1000 milliLiter(s) IV Continuous <Continuous>    PRN Inpatient Medications  oxyCODONE    IR 2.5 milliGRAM(s) Oral every 4 hours PRN  oxyCODONE    IR 5 milliGRAM(s) Oral every 4 hours PRN    REVIEW OF SYSTEMS  --------------------------------------------------------------------------------  see above    VITALS/PHYSICAL EXAM  --------------------------------------------------------------------------------  T(C): 36.6 (02-17-25 @ 08:40), Max: 37.1 (02-16-25 @ 20:30)  HR: 64 (02-17-25 @ 08:40) (64 - 74)  BP: 137/60 (02-17-25 @ 08:40) (127/57 - 142/65)  RR: 18 (02-17-25 @ 08:40) (18 - 18)  SpO2: 95% (02-17-25 @ 08:40) (92% - 95%)  Wt(kg): --    02-16-25 @ 07:01  -  02-17-25 @ 07:00  --------------------------------------------------------  IN: 700 mL / OUT: 750 mL / NET: -50 mL    02-17-25 @ 07:01  -  02-17-25 @ 12:14  --------------------------------------------------------  IN: 0 mL / OUT: 300 mL / NET: -300 mL    Physical Exam:  	Gen: NAD, well-appearing  	HEENT: MMM, +NC  	Pulm: CTA B/L  	CV: RRR, S1S2;  	Abd: +BS, soft, nontender/nondistended  	: No suprapubic tenderness              Extremities: no bilateral LE edema noted.               Neuro: Awake  	Skin: Warm    LABS/STUDIES  --------------------------------------------------------------------------------              8.1    4.85  >-----------<  126      [02-17-25 @ 08:54]              24.8     139  |  107  |  70  ----------------------------<  105      [02-17-25 @ 07:23]  4.2   |  20  |  3.35        Ca     9.2     [02-17-25 @ 07:23]    Creatinine Trend:  SCr 3.35 [02-17 @ 07:23]  SCr 2.98 [02-16 @ 13:36]  SCr 2.97 [02-15 @ 05:31]  SCr 2.93 [02-14 @ 06:53]  SCr 2.66 [02-13 @ 17:39]

## 2025-02-17 NOTE — PROGRESS NOTE ADULT - SUBJECTIVE AND OBJECTIVE BOX
ORTHO  Patient is a 89y old  Male who presents with a chief complaint of L IT Fx (16 Feb 2025 11:53)    Pt. resting without complaint    VS-  T(C): 36.9 (02-17-25 @ 05:09), Max: 37.1 (02-16-25 @ 20:30)  HR: 69 (02-17-25 @ 05:09) (65 - 74)  BP: 142/65 (02-17-25 @ 05:09) (112/51 - 142/65)  RR: 18 (02-17-25 @ 05:09) (18 - 18)  SpO2: 93% (02-17-25 @ 05:09) (91% - 94%)  Wt(kg): --    M.S. A&O  Extremity- Left LE- dressing C/D/I  Neuro-              Motor- (+) Ankle- DF/PF              Sensation-= grossly intact to light touch              Calves- soft, nontender- PAS                               8.9    6.53  )-----------( 121      ( 16 Feb 2025 13:36 )             26.8     02-16    137  |  107  |  63[H]  ----------------------------<  165[H]  4.4   |  16[L]  |  2.98[H]    Ca    9.2      16 Feb 2025 13:36             Impression: Stable       Plan: Continue present treatment                 Out of bed, ambulate, weight bearing (status)                  Physical therapy evaluation, follow up                  Continue to monitor

## 2025-02-17 NOTE — DISCHARGE NOTE PROVIDER - NSDCCPTREATMENT_GEN_ALL_CORE_FT
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)   • Glaucoma   • Hyperlipidemia   • Hypertension   • Internal hemorrhoids   • Memory loss   • Portal hypertension (CMD)   • Crohn's disease (CMD)   • Depression with anxiety   • Hepatic cirrhosis (CMD)   • Esophageal varices without bleeding (CMD)   • Type 2 diabetes mellitus with hyperglycemia, without long-term current use of insulin (CMD)   • Cardiac murmur   • External hemorrhoid, bleeding   • Gastroesophageal reflux disease without esophagitis   • Stress incontinence of urine   • Uncontrolled type 2 diabetes mellitus with hyperglycemia (CMD)   • Iron deficiency anemia due to chronic blood loss   • GILLIAM (nonalcoholic steatohepatitis)   • Anemia of chronic disease   • Esophageal varices in cirrhosis (CMD)   • Diverticulosis   • Hypomagnesemia   • Atherosclerosis of native coronary artery   • Overactive bladder   • Idiopathic hypotension   • Hyponatremia   • Severe protein-calorie malnutrition (CMD)   • Anemia   • Acute on chronic blood loss anemia   • Thrombocytopenia (CMD)   • Uncontrolled type 2 diabetes mellitus with hyperglycemia, without long-term current use of insulin (CMD)   • Shortness of breath on exertion   • Rectal bleeding   • Type 2 diabetes mellitus with other diabetic kidney complication (CMD)   • Atherosclerosis of aorta (CMD)   • Palliative care encounter   • Pulmonary emphysema, unspecified emphysema type (CMD)   • Cancer (CMD)   • Right-sided congestive heart failure secondary to left-sided congestive heart failure (CMD)   • Longstanding persistent atrial fibrillation (CMD)   • Debility   • Primary insomnia   • Dizziness   • Hyperglycemia   • Weakness   • Iron deficiency anemia due to chronic blood loss   • Type 2 diabetes mellitus without complication, without long-term current use of insulin (CMD)   •  Essential hypertension, benign   • Dyslipidemia   • Coronary artery disease involving native coronary artery of native heart without angina pectoris   • (HFpEF) heart failure with preserved ejection fraction (CMD)   • Liver cirrhosis secondary to GILLIAM (CMD)   • CKD (chronic kidney disease) stage 4, GFR 15-29 ml/min (CMD)   • Thrombocytopenia (CMD)   • Symptomatic anemia   • Acute on chronic congestive heart failure, unspecified heart failure type (CMD)   • Elevated troponin   • Chest pain        Follow up  NOTE - NEPHROLOGY      Reason for Consultation  CKD Stage 4, Hyponatremia     Ms. Gregory is evaluated today at the request of Ewelina Soares MD.      HISTORY OF PRESENT ILLNESS  Ms. Gregory is a 89 year old female, who initially presented to the ER c/o left sided chest pain with associated nausea. Denied any associated dyspnea, fevers, chills, nausea or vomiting. Initial labs in ER significant for hyponatremia with Na+ level of 122.     S; alert and interactive but with weakness   Home Medications:      No medications prior to admission.       MEDICATIONS  No current facility-administered medications for this encounter.     Current Outpatient Medications   Medication Sig Dispense Refill   • NYSTATIN, TOPICAL, Powder Apply 1 application. topically in the morning and 1 application. in the evening.     • ondansetron (Zofran) 4 MG tablet Take 4 mg by mouth 3 times daily as needed. Indications: Nausea and Vomiting     • melatonin 5 MG Take 5 mg by mouth at bedtime. take 5mg (up to 15mg) by mouth at bedtime   Indications: Trouble Sleeping     • SITagliptin (Januvia) 25 MG tablet Take 25 mg by mouth daily. Indications: Type 2 Diabetes     • acetaminophen (TYLENOL) 500 MG tablet Take 500 mg by mouth every 6 hours as needed. take 1-2 tabs every 6hrs as needed for pain  Indications: Pain     • diphenhydrAMINE (Benadryl Allergy) 25 MG tablet Take 25 mg by mouth every 6 hours as needed. take one tablet by mouth every 6hrs  as needed for itching  Indications: itching     • witch hazel-glycerin (TUCKS) pad Apply 1 Pad topically as needed. Indications: hemorrhoids     • oxygen (O2) gas Inhale 2-4 L/min into the lungs as needed (shortness of breath). Indications: shortness of breath     • bisacodyl (DULCOLAX) 10 MG suppository Place 10 mg rectally daily as needed for Constipation. Unwrap and insert 1 suppository rectally daily as needed for constipation unrelieved with bowel regimen. As directed by nurse for emergency only    Indications: Constipation     • LORazepam (ATIVAN) 0.5 MG tablet Take 0.5 mg by mouth every 2 hours as needed for Anxiety. Take 1 tablet by mouth/under the tongue every 2 hours as needed for mild anxiety. Use as directed by nurse for emergency only    Indications: Feeling Anxious     • haloperidol (HALDOL) 2 MG/ML solution Take 0.5 mg by mouth every 4 hours as needed (agitation/nausea). Take 0.25ml (0.5mg) under the roxann every 4 hours as needed for agitation/nausea. Use as directed by nurse for emergency only    Indications: agitation/nausea     • HYDROmorphone (DILAUDID) 1 MG/ML Liquid Take 0.5-1 mg by mouth every 2 hours as needed (pain/dyspnea). Take 0.5mg sublingually for mild or 1mg for moderate pain/dyspnea every 2 hours as needed. As directed by nurse for emergency only      Indications: pain/dyspnea     • hydroCORTisone (ANUSOL-HC) 2.5 % rectal cream Place 1 application. rectally in the morning and 1 application. in the evening. 30 g 0   • docusate sodium-sennosides (SENOKOT S) 50-8.6 MG per tablet Take 2 tablets by mouth daily. Indications: Constipation     • meclizine (ANTIVERT) 25 MG tablet Take 1 tablet by mouth daily as needed for Dizziness. 90 tablet 0   • pantoprazole (PROTONIX) 40 MG tablet Take 1 tablet by mouth daily. 30 tablet 5   • polyethylene glycol (MIRALAX) 17 g packet Take 17 g by mouth daily. Stir and dissolve powder in any 4 to 8 ounces of beverage, then drink. 30 each 0   • latanoprost  (XALATAN) 0.005 % ophthalmic solution Place 1 drop into both eyes nightly. Indications: Persistently Increased Pressure in the Eye         ALLERGIES  Allergies as of 06/25/2023 - Reviewed 06/25/2023   Allergen Reaction Noted   • Cinnamon   (food or med) THROAT SWELLING 05/25/2015   • Shrimp   (food) THROAT SWELLING 05/25/2015   • Ceftriaxone PRURITUS 04/23/2022   • Codeine Other (See Comments) 03/11/2023   • Iodine   (environmental or med) Other (See Comments) 05/25/2015   • Pea   (food or med) Other (See Comments) 05/25/2015        HISTORIES  Past Medical History:   Diagnosis Date   • Acute GI bleeding 04/24/2022   • Acute lower GI bleeding 05/20/2021   • Acute pancreatitis 04/08/2021   • Acute UTI 04/24/2022   • Anemia    • Annual physical exam 02/23/2019   • Anxiety    • ARF (acute renal failure) (CMD) 04/07/2021   • Atherosclerosis of native coronary artery 07/09/2021   • Bleeding hemorrhoids 04/20/2021   • Bradycardia 06/18/2021   • Cancer (CMD) 11/23/2022   • Cataract    • Change in bowel movement    • Chronic kidney disease    • Crohn's disease (CMD)    • Depressive disorder    • Diabetes (CMD)    • Diabetes mellitus (CMD)    • Diarrhea    • Diarrhea in adult patient 04/20/2021   • Essential (primary) hypertension    • External hemorrhoid, bleeding 05/03/2019   • Frequent urinary tract infections    • Gastroesophageal reflux disease    • Hospital discharge follow-up 04/20/2021   • Hyperammonemia (CMD) 04/07/2021   • Hyperlipidemia    • Hypotension, chronic 04/20/2021   • Liver disease    • Nausea & vomiting    • Need for vaccination against Streptococcus pneumoniae 02/23/2019   • Neuropathy    • Osteopenia    • Other ascites 04/07/2021   • Prolapse of female genital organs 08/17/2021   • Pulmonary emphysema, unspecified emphysema type (CMD) 11/4/2022   • Rectal bleeding    • Right-sided congestive heart failure secondary to left-sided congestive heart failure (CMD) 11/23/2022   • Syncope and collapse  09/29/2021   • Thrombosed external hemorrhoid 09/03/2019   • Trouble swallowing    • Weight loss        Past Surgical History:   Procedure Laterality Date   • Appendectomy     • Appendectomy         Family History   Problem Relation Age of Onset   • Diabetes Mother    • Birth defects Brother        Social History     Socioeconomic History   • Marital status: /Civil Union     Spouse name: Not on file   • Number of children: 4   • Years of education: Not on file   • Highest education level: Not on file   Occupational History   • Not on file   Tobacco Use   • Smoking status: Never   • Smokeless tobacco: Never   Vaping Use   • Vaping Use: never used   Substance and Sexual Activity   • Alcohol use: Never   • Drug use: Never   • Sexual activity: Not Currently   Other Topics Concern   • Not on file   Social History Narrative    ** Merged History Encounter **          Social Determinants of Health     Financial Resource Strain: Low Risk  (5/19/2023)    Financial Resource Strain    • Social Determinants: Financial Resource Strain: None   Food Insecurity: No Food Insecurity (5/18/2023)    Food Insecurity    • Social Determinants: Food Insecurity: Never   Transportation Needs: No Transportation Needs (5/18/2023)    PRAPARE - Transportation    • Lack of Transportation (Medical): No    • Lack of Transportation (Non-Medical): No   Physical Activity: Insufficiently Active (4/24/2023)    Exercise Vital Sign    • Days of Exercise per Week: 2 days    • Minutes of Exercise per Session: 10 min   Stress: Medium Risk (4/24/2023)    Stress    • Social Determinants: Stress: Somewhat   Social Connections: Somewhat Isolated (5/18/2023)    Social Connections    • Social Determinants: Social Connections: 1 or 2 times a week   Intimate Partner Violence: Not At Risk (5/18/2023)    Intimate Partner Violence    • Social Determinants: Intimate Partner Violence Past Fear: No    • Social Determinants: Intimate Partner Violence Current Fear: No           REVIEW OF SYSTEMS    12 point review of systems reviewed and negative aside from what is mentioned in the HPI    PHYSICAL EXAM  Visit Vitals  /65 (BP Location: LFA - Left forearm, Patient Position: Supine)   Pulse 100   Temp 98.4 °F (36.9 °C) (Oral)   Resp 16   Ht 5' 1\" (1.549 m)   Wt 61.2 kg (135 lb)   SpO2 97%   BMI 25.51 kg/m²       Physical Exam:  Gen:  Awake, alert, oriented, in NAD  HEENT: Moist mucous membranes, normal conjuctiva, neck supple.   CVS: RRR +S1, +S2 no edema  Lungs: CTA bilaterally, no wheezes, rhonchi, or rales bilaterally  Abdomen:soft, NT, ND, +BS.  Extremities: Warm, dry   Neuro: Moves all four extremities, grossly nonfocal  Psychiatric: cooperative   Skin: Warm, Dry        Data Review:   Recent Labs     06/25/23 0343 06/26/23  0522 06/26/23  1806 06/27/23  0817   SODIUM 122* 126* 130* 132*   POTASSIUM 4.2 4.1  --  4.1   CHLORIDE 91* 96*  --  99   CO2 22 22  --  21   ANIONGAP 13 12  --  16   GLUCOSE 199* 199*  --  135*   BUN 30* 28*  --  24*   CREATININE 1.78* 1.94*  --  1.88*   CALCIUM 9.2 8.6  --  8.9   PHOS  --  3.3  --  3.1   MG 1.7 1.8  --  1.9   BILIRUBIN 1.1*  --   --   --    AST 43*  --   --   --    GPT 28  --   --   --    ALKPT 107  --   --   --    TOTPROTEIN 6.7  --   --   --    ALBUMIN 3.4*  --   --   --    GLOB 3.3  --   --   --    AGR 1.0  --   --   --         Recent Labs     06/25/23 0343 06/26/23  0522   WBC 5.0 4.2   RBC 2.64* 2.42*   HGB 9.0* 8.4*   HCT 26.0* 24.4*   * 91*   MCV 98.5 100.8*   MCH 34.1* 34.7*   MCHC 34.6 34.4   NRBCRE 0 0          Chemistry:  Lab Results   Component Value Date    SODIUM 132 (L) 06/27/2023    SODIUM 143 10/21/2020    POTASSIUM 4.1 06/27/2023    POTASSIUM 4.1 10/21/2020    CHLORIDE 99 06/27/2023    CHLORIDE 111 (H) 10/21/2020    CO2 21 06/27/2023    CO2 23 10/21/2020    BUN 24 (H) 06/27/2023    BUN 19 10/21/2020    CREATININE 1.88 (H) 06/27/2023    CREATININE 1.04 (H) 10/21/2020    CALCIUM 8.9 06/27/2023    CALCIUM 10.0  10/21/2020    GLUCOSE 135 (H) 06/27/2023    GLUCOSE 160 (H) 10/21/2020      CBC:  Lab Results   Component Value Date    WBC 4.2 06/26/2023    WBC 4.2 10/21/2020    RBC 2.42 (L) 06/26/2023    RBC 2.77 (L) 10/21/2020    HGB 8.4 (L) 06/26/2023    HGB 8.7 (L) 10/21/2020    HCT 24.4 (L) 06/26/2023    HCT 28.3 (L) 10/21/2020    PLT 91 (L) 06/26/2023     (L) 10/21/2020      Urinalysis Component Data:  Lab Results   Component Value Date    MUCUS Present 06/25/2023    MUCUS PRESENT 05/22/2017     Cardiac markers:   No results found for: TROPONINI, MYOGLOBIN, CKMB   No components found for: 79554  Magnesium   Date Value Ref Range Status   06/27/2023 1.9 1.7 - 2.4 mg/dL Final     Phosphorus   Date Value Ref Range Status   06/27/2023 3.1 2.4 - 4.7 mg/dL Final   ?      Imaging Study  LAST CT:  === 04/19/23 ===    CT HEAD WO CONTRAST    - Narrative -  EXAM: CT HEAD WO CONTRAST    CLINICAL INDICATION: Vertigo.  Nausea.    TECHNIQUE: Multislice helical CT through the brain performed.    COMPARISON: CT examination brain 04/23/2022.    FINDINGS:    Ventricles and sulci are prominent bilaterally.  Decreased attenuation  periventricular white matter bilaterally.  No evidence of intra-axial or  extra-axial hemorrhage, mass effect or shift.  Punctate focus decreased  attenuation left basal ganglia and there is a 7 mm focus of decreased  attenuation within the right choroidal fissure and remains unchanged.  Vascular calcifications distal portion left and right vertebral artery and  cavernous segments left and right internal carotid arteries compatible with  atherosclerosis.  There is 3 mm ringlike calcification distal portion  supracavernous segment right internal carotid artery which remains  unchanged.    Evidence of cataract surgery right globe.    - Impression -  1.  Finding may be compatible with tortuosity and atherosclerosis distal  portion supracavernous portion of the right internal carotid artery;  however I cannot  entirely exclude 3 mm predominantly calcified aneurysm in  this area.  Recommend correlation with clinical assessment and if  additional imaging is warranted, MR angiography or contrast enhanced CT  angiography of the head is recommended to characterize this finding  further.  2.  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  3.  Chronic lacunar infarct or dilated perivascular space base of the left  basal ganglia.  4.  Chronic periventricular white matter ischemic change.  5.  Volume loss.    No evidence of intracranial bleed or mass effect and no significant  interval change when compared to the previous examination.    Electronically Signed by: COLLIN HARMON M.D.  Signed on: 4/19/2023 1:56 PM  Workstation ID: [id number]      === 06/25/22 ===    CT CHEST WO CONTRAST    - Narrative -  EXAM: CT CHEST WO CONTRAST    CLINICAL INDICATION: Unresolving pneumonia.    5 mm axial sections through the chest and upper abdomen without IV  contrast.  Multiplanar reconstructions were done.    Comparison made with prior chest CT 04/08/2021.  Prior examination report  describing mild emphysematous changes.  2 mm groundglass nodule lateral  segment right upper lobe.  No lung masses or enlarged lymph nodes mild  cardiomegaly and very heavy calcification of the coronary arteries.  Very  heavy calcification of the tracheal and bronchial cartilages.  Calcified  granuloma at the left lung base.  Abdominal ascites.      Presently, the heart size appears normal and a slightly smaller since the  prior study.  Again, very heavy coronary artery calcification and mild to  moderate scattered calcification within the thoracic aorta.  No evidence  for thoracic aortic aneurysm.  No enlarged hilar or mediastinal lymph  nodes.  There is heavy calcification of the mitral annulus.    Very tiny nodule about 1 to 2 mm right upper lobe unchanged.  Very mild,  focal fibrotic changes in the right  midlung medially as present previously  as well.  No active appearing infiltrates or pleural effusions.    Imaging of the upper abdomen revealing resolution of ascites.  There are  conglomerate stones along the floor the gallbladder as well as other small  stones.  Mild thickening of the adrenal glands, mainly the left.  Tiny cyst  lower right lobe of the liver.  3 mm nonobstructing stone upper pole right  kidney.  Mildly lobulated liver and venous collaterals consistent with mild  portal hypertension.  Mild thoracic spine degenerative spurring and  multilevel disc calcifications.  Minimal dextroscoliosis.    - Impression -  Heavy coronary artery calcification.  No thoracic aortic  aneurysm.  Heavy calcification of the mitral annulus.  No infiltrates or  effusions.  Minimal fibrotic changes on the right.  Small venous  collaterals reidentified in the mid/upper abdomen as well as in the  perigastric and perisplenic regions.  Gallstones..  Resolution of ascites.  Right lobe of the liver cyst.  Nonobstructing right kidney stone.  Cirrhosis and mild changes of portable hypertension      Electronically Signed by: SILVERIO KLEIN M.D.  Signed on: 6/26/2022 8:37 AM      === 04/23/22 ===    CT ABDOMEN PELVIS WO CONTRAST    - Narrative -  EXAM: CT ABDOMEN PELVIS WO CONTRAST    CLINICAL INDICATION: Anemia, acute abdominal pain, nonlocalized.    5 mm axial sections were done through the lower chest, abdomen and pelvis  using oral but not intravenous contrast.    Multiplanar reconstructions were done.    Comparison made with prior CT abdomen and pelvis examination 05/20/2021  report describing hepatic cirrhosis with the changes of portal  hypertension.  Multiple liver lesions questioning neoplastic process in  view of the history of cirrhosis.  Cholelithiasis.  Nonobstructing kidney  stones.    On the present examination the lung bases are clear.  No  atelectasis/consolidation or pleural effusion.  Heart appears enlarged.    The  spleen, kidneys and adrenals unremarkable.  There is a nodular contour  liver suggesting cirrhosis.  Gallstones.  Mild ascites, much improved from  prior examination.  Pancreatic head edema appears related to enteritis.  Diffuse mild mural thickening of small bowel loops with adjacent stranding.  No large or small bowel obstruction.  Appendix not visualized.  Diverticulosis.  Relatively large amount of fecal material in the colon.  Uterus unremarkable.  Thoracic and lumbar spine degenerative changes.  This  includes osteoarthritis and advanced L4-L5 and L5-S1 disc disease.  Prominent L5 Schmorl's node.  Heavy calcification of the abdominal aorta  and iliac arteries.    - Impression -  Cirrhosis.  Enteritis.  Cholelithiasis.  Markedly improved  ascites.  Heavy arteriosclerosis.    Electronically Signed by: SILVERIO KLEIN M.D.  Signed on: 4/25/2022 7:45 AM  LAST X-RAY:  === 06/25/23 ===    XR CHEST AP OR PA    - Narrative -  EXAM: XR CHEST AP OR PA    CLINICAL INDICATION: Chest pain    Prior chest 05/18/2023 report describing mild changes of arteriosclerosis.  Small linear atelectasis or scarring at the left lung base.    Presently, hypoinflated lungs.  Heart size grossly normal.  There is  calcification of the mitral annulus.  Slightly prominent interstitial lung  markings but no infiltrates or effusions.    - Impression -  Prominent interstitial lung markings.  Finding nonspecific and  may relate to patient age.  No infiltrates or effusions.    Electronically Signed by: SILVERIO KLEIN M.D.  Signed on: 6/25/2023 5:56 AM  Workstation ID: [id number]      === 05/18/23 ===    XR CHEST AP OR PA    - Narrative -  EXAM: XR CHEST AP OR PA    CLINICAL INDICATION: Shortness of breath.    COMPARISON: Chest x-ray 04/20/2023.    - Impression -  FINDINGS/IMPRESSION:    Shallow inspiratory effort.  Heart size, pulmonary vasculature and the  mediastinal contour remains stable.  There is mild linear atelectasis or  minimal  peripheral interstitial infiltrate/edema left lung base.  Lungs are  otherwise clear.  No evidence of significant pleural effusions or  pneumothorax.    Vascular calcifications thoracic aorta compatible with atherosclerosis.    Endplate spurs thoracic spine compatible with endplate degenerative change.    Diffuse decrease attenuation of bone compatible with  osteopenia/osteoporosis.  If warranted, consider DEXA scan.    Electronically Signed by: COLLIN HARMON M.D.  Signed on: 5/18/2023 11:28 AM  Workstation ID: [id number]      === 04/19/23 ===    XR CHEST AP OR PA    - Narrative -  EXAM: Portable chest single view 04/20/2023 at 1015 hours    CLINICAL INDICATION: Shortness of breath.    COMPARISON: Portable chest from 03/11/2023.    Portable AP semiupright view of chest is submitted.    FINDINGS: Cardiac silhouette and pulmonary vasculature appear within normal  limits.  Lungs appear grossly clear of pneumonic infiltrate.  No  significant pleural effusion or pneumothorax is seen.  Mediastinum and mild  degenerative changes of thoracic spine appear relatively stable.    - Impression -  No definitive acute cardiopulmonary findings.  Recommend  followup as clinically warranted.    Electronically Signed by: MARCIAL CHAMPAGNE M.D.  Signed on: 4/20/2023 10:54 AM  Workstation ID: [id number]    Intake and Output  I/O last 3 completed shifts:  In: -   Out: 600 [Urine:600]     Latest Reference Range & Units 01/04/23 08:08 05/21/23 08:34 06/25/23 12:37 06/26/23 09:18   CHLORIDE, URINE (TOTAL) mmol/L   53 17   CREATININE, URINE (TOTAL) mg/dL 91.00  92.02 50.27 92.80 63.36   MICROALBUMIN/CREAT <30.0 mg/g 45.3 (H)      URINE MICRO mg/dL 4.12      OSMOLALITY, URINE 50 - 1,200 mOsm/kg   377    PROTEIN, URINE (TOTAL) <12 mg/dL 17 (H) 11 17 (H) 7   PROTEIN/CREATININE RATIO <=199 mgPR/gCR 185 219 (H)     SODIUM, URINE (TOTAL) mmol/L   26 8   (H): Data is abnormally high  ASSESSMENT/PLAN    Catalina is a 89 year old b female with cannon  cirrhosis, congestive heart failure, anemia, ckd presenting with chest pain.   Patient reports left sided chest pain occurring this morning around 1am . No shortness of breath.  Never this bad so she called her team and she presented to er. Currently chest pain free        Hyponatremia - Presumed 2/2 intravascular volume contraction a/w poor PO intake/outpatient diuretics  - Continue gentle isotonic IVF's with outpatient diuretics stopped  Sodium increased 122 to 126 to 130 to 132  Fena< 1 %  Gentle isotonic volume nutritional support       CKD stage 4  - likley underlying CKD due to diabetic nephropathy and / or hypertensive nephrosclerosis  - baseline Cr earlier this year was 2.1-2.4  Creatinine at 1.94   -in DNR status   Conservative Mx  - Non-oliguric in nature and recommend avoid nephrotoxins        H/O Liver cirrhosis - GILLIAM  - Outpatient diuretics held in setting hyponatremia with concern for volume contracted stated    H/O Nephrolithiasis    DM2 with nephropathy - glycemic control per primary team     HTN  - BP acceptable at this time     Anemia in CKD  - history of portal hypertension gastropathy; also had h/o esophageal varices s/p banding- iron studies     Chronic Thrombocytopenia  - suspect this is be due to cirrhosis    DNR Status  - Primary team to confirm hospice status    Plan   Nutritional support  Pt.ot as tolerated   Case discussed with nursing team    Thank you for the consult please feel free to contact with any further questions. Will continue to monitor closely from a Renal standpoint.    Authored by Rocio Maldonado MD on 6/27/2023 6:58 PM   PRINCIPAL PROCEDURE  Procedure: ORIF, hip, with intramedullary rayne  Findings and Treatment: Left

## 2025-02-17 NOTE — DISCHARGE NOTE PROVIDER - NSDCFUADDINST_GEN_ALL_CORE_FT
Please call Dr. Torres' s office to schedule a follow up appointment after Rehab.   Recommend follow up with Medical MD within next 4 weeks.  Dressing should be changed every other day may remove when dry after 2 changes. (If sutures or staples present d/c pod#14 apply steri strips)   Out of bed, ambulate, weight bearing as tolerated-   Physical therapy to assist with exercise and help increase endurance.   Please contact Doctors office regarding arrangements for out patient Physical therapy.

## 2025-02-17 NOTE — DISCHARGE NOTE PROVIDER - CARE PROVIDER_API CALL
Adria Torres  Orthopaedic Trauma  [address]  [place] [address]  Phone: [phone]  Fax: [phone]  Follow Up Time:

## 2025-02-18 NOTE — PROGRESS NOTE ADULT - SUBJECTIVE AND OBJECTIVE BOX
[facility] DIVISION OF KIDNEY DISEASE AND HYPERTENSION  [phone]    RENAL FOLLOW UP NOTE- NEPHROHOSPITALIST  --------------------------------------------------------------------------------  Patient seen and examined this morning.  Appeared much more energetic, OOB, eating breakfast.    PAST HISTORY  --------------------------------------------------------------------------------  No significant changes to PMH, PSH, FHx, SHx, unless otherwise noted    ALLERGIES & MEDICATIONS  --------------------------------------------------------------------------------  Allergies    No Known Allergies    Intolerances      Standing Inpatient Medications  acetaminophen     Tablet .. 975 milliGRAM(s) Oral every 8 hours  allopurinol 100 milliGRAM(s) Oral daily  amLODIPine   Tablet 10 milliGRAM(s) Oral at bedtime  atorvastatin 10 milliGRAM(s) Oral at bedtime  doxazosin 2 milliGRAM(s) Oral at bedtime  finasteride 5 milliGRAM(s) Oral daily  folic acid 1 milliGRAM(s) Oral daily  hydrALAZINE 100 milliGRAM(s) Oral three times a day  isosorbide   mononitrate ER Tablet (IMDUR) 60 milliGRAM(s) Oral daily  metoprolol succinate ER 25 milliGRAM(s) Oral at bedtime  pantoprazole    Tablet 40 milliGRAM(s) Oral before breakfast  polyethylene glycol 3350 17 Gram(s) Oral at bedtime  senna 2 Tablet(s) Oral at bedtime  sodium bicarbonate 650 milliGRAM(s) Oral <User Schedule>  sodium chloride 0.9%. 1000 milliLiter(s) IV Continuous <Continuous>    PRN Inpatient Medications  oxyCODONE    IR 5 milliGRAM(s) Oral every 4 hours PRN  oxyCODONE    IR 2.5 milliGRAM(s) Oral every 4 hours PRN      FOCUSED REVIEW OF SYSTEMS  --------------------------------------------------------------------------------  denies fevers/rigors  denies CP/palpitations  on NC but denies SOB/cough  denies oliguria/dysuria      VITALS/PHYSICAL EXAM  --------------------------------------------------------------------------------  T(C): 36.6 (02-18-25 @ 12:17), Max: 36.9 (02-17-25 @ 18:45)  HR: 69 (02-18-25 @ 12:17) (58 - 69)  BP: 138/59 (02-18-25 @ 12:17) (108/50 - 146/65)  RR: 18 (02-18-25 @ 12:17) (18 - 18)  SpO2: 98% (02-18-25 @ 12:17) (94% - 98%)  Wt(kg): --        02-17-25 @ 07:01  -  02-18-25 @ 07:00  --------------------------------------------------------  IN: 1100 mL / OUT: 950 mL / NET: 150 mL    02-18-25 @ 07:01  -  02-18-25 @ 14:11  --------------------------------------------------------  IN: 0 mL / OUT: 400 mL / NET: -400 mL      Physical Exam:  	Gen: NAD, OOB to chair  	Pulm: CTA B/L no w/r/r  	CV: RRR, S1S2  	Abd: +BS, soft, nontender/nondistended  	: No suprapubic tenderness.  no macias          Extremity: No pedal edema      LABS/STUDIES  --------------------------------------------------------------------------------              9.6    5.14  >-----------<  156      [02-18-25 @ 10:01]              29.2     138  |  108  |  69  ----------------------------<  120      [02-18-25 @ 10:01]  4.8   |  17  |  3.19        Ca     9.6     [02-18-25 @ 10:01]              Creatinine Trend:  SCr 3.19 [02-18 @ 10:01]  SCr 3.35 [02-17 @ 07:23]  SCr 2.98 [02-16 @ 13:36]  SCr 2.97 [02-15 @ 05:31]  SCr 2.93 [02-14 @ 06:53]              Urinalysis - [02-18-25 @ 10:01]      Color  / Appearance  / SG  / pH       Gluc 120 / Ketone   / Bili  / Urobili        Blood  / Protein  / Leuk Est  / Nitrite       RBC  / WBC  / Hyaline  / Gran  / Sq Epi  / Non Sq Epi  / Bacteria

## 2025-02-18 NOTE — PROGRESS NOTE ADULT - SUBJECTIVE AND OBJECTIVE BOX
89-year-old male patient past medical history of hypertension  CKD, and hyperlipidemia who presents to the emergency department after a fall.  Patient states he was attempting to push a fridge with wheels when he slipped and fell on his left side.  Presenting to the emergency department with left hip pain since. Patient was brought to [facility] for further evaluation and treatment. In the ED he was found to have a left hip fracture and is s/p an IMN. Patient seen now resting comfortably. Patient tolerated he procedure well. Patient has started working with physical therapy. Seen OOB sitting in a chair       MEDICATIONS  (STANDING):  acetaminophen     Tablet .. 975 milliGRAM(s) Oral every 8 hours  allopurinol 100 milliGRAM(s) Oral daily  amLODIPine   Tablet 10 milliGRAM(s) Oral at bedtime  atorvastatin 10 milliGRAM(s) Oral at bedtime  doxazosin 2 milliGRAM(s) Oral at bedtime  finasteride 5 milliGRAM(s) Oral daily  folic acid 1 milliGRAM(s) Oral daily  hydrALAZINE 100 milliGRAM(s) Oral three times a day  isosorbide   mononitrate ER Tablet (IMDUR) 60 milliGRAM(s) Oral daily  metoprolol succinate ER 25 milliGRAM(s) Oral at bedtime  pantoprazole    Tablet 40 milliGRAM(s) Oral before breakfast  polyethylene glycol 3350 17 Gram(s) Oral at bedtime  senna 2 Tablet(s) Oral at bedtime  sodium bicarbonate 650 milliGRAM(s) Oral <User Schedule>  sodium chloride 0.9%. 1000 milliLiter(s) (60 mL/Hr) IV Continuous <Continuous>    MEDICATIONS  (PRN):  oxyCODONE    IR 5 milliGRAM(s) Oral every 4 hours PRN Severe Pain (7 - 10)  oxyCODONE    IR 2.5 milliGRAM(s) Oral every 4 hours PRN Moderate Pain (4 - 6)          VITALS:   T(C): 36.6 (02-18-25 @ 12:17), Max: 36.9 (02-17-25 @ 18:45)  HR: 69 (02-18-25 @ 12:17) (58 - 69)  BP: 138/59 (02-18-25 @ 12:17) (108/50 - 146/65)  RR: 18 (02-18-25 @ 12:17) (18 - 18)  SpO2: 98% (02-18-25 @ 12:17) (94% - 98%)  Wt(kg): --      PHYSICAL EXAM:  GENERAL: NAD, well-groomed, well-developed  HEAD:  Atraumatic, Normocephalic  EYES: EOMI, PERRLA, conjunctiva and sclera clear  ENMT: No tonsillar erythema, exudates, or enlargement; Moist mucous membranes, Good dentition, No lesions  NECK: Supple, No JVD, Normal thyroid  NERVOUS SYSTEM:  Alert & Oriented X3, Good concentration; Motor Strength 5/5 B/L upper and lower extremities; DTRs 2+ intact and symmetric  CHEST/LUNG: Clear to percussion bilaterally; No rales, rhonchi, wheezing, or rubs  HEART: Regular rate and rhythm; No murmurs, rubs, or gallops  ABDOMEN: Soft, Nontender, Nondistended; Bowel sounds present  EXTREMITIES:  2+ Peripheral Pulses, No clubbing, cyanosis, or edema  LYMPH: No lymphadenopathy noted  SKIN: No rashes or lesions  LABS:        CBC Full  -  ( 18 Feb 2025 10:01 )  WBC Count : 5.14 K/uL  RBC Count : 3.09 M/uL  Hemoglobin : 9.6 g/dL  Hematocrit : 29.2 %  Platelet Count - Automated : 156 K/uL  Mean Cell Volume : 94.5 fl  Mean Cell Hemoglobin : 31.1 pg  Mean Cell Hemoglobin Concentration : 32.9 g/dL  Auto Neutrophil # : x  Auto Lymphocyte # : x  Auto Monocyte # : x  Auto Eosinophil # : x  Auto Basophil # : x  Auto Neutrophil % : x  Auto Lymphocyte % : x  Auto Monocyte % : x  Auto Eosinophil % : x  Auto Basophil % : x    02-18    138  |  108  |  69[H]  ----------------------------<  120[H]  4.8   |  17[L]  |  3.19[H]    Ca    9.6      18 Feb 2025 10:01          Urinalysis Basic - ( 18 Feb 2025 10:01 )    Color: x / Appearance: x / SG: x / pH: x  Gluc: 120 mg/dL / Ketone: x  / Bili: x / Urobili: x   Blood: x / Protein: x / Nitrite: x   Leuk Esterase: x / RBC: x / WBC x   Sq Epi: x / Non Sq Epi: x / Bacteria: x      CAPILLARY BLOOD GLUCOSE          RADIOLOGY & ADDITIONAL TESTS:

## 2025-02-18 NOTE — PROGRESS NOTE ADULT - NSPROGADDITIONALINFOA_GEN_ALL_CORE
Please do not hesitate to TEAMS Dr. De La Rosa if further input needed during the day.  For questions Weekdays after 5PM and on weekends, please page the Renal Fellow on call.

## 2025-02-18 NOTE — PROVIDER CONTACT NOTE (OTHER) - SITUATION
pt hip dressing leaking
Pt is due for amlodipine, doxazosin, hydralazine, and metoprolol at 2200 with a bp of 160/69 HR 60

## 2025-02-18 NOTE — PROGRESS NOTE ADULT - SUBJECTIVE AND OBJECTIVE BOX
Pt seen/examined. Doing well. Pain controlled. No acute overnight complaints or events.    T(C): 36.7 (02-18-25 @ 05:24), Max: 36.9 (02-17-25 @ 18:45)  HR: 65 (02-18-25 @ 05:24) (58 - 70)  BP: 130/59 (02-18-25 @ 05:24) (108/50 - 147/69)  RR: 18 (02-18-25 @ 05:24) (18 - 18)  SpO2: 96% (02-18-25 @ 05:24) (94% - 98%)  Wt(kg): --  - Gen: NAD    PE  Gen: NAD, alert and oriented  Resp: Unlabored breathing  LLE: Skin intact, no ecchymosis, dressing saturated, so changed and replaced with pressure dressing       SILT DP/SP/Gary/Saph/tib       +EHL/FHL/TA/Gastroc,        DP+,        soft compartments, no calf ttp         89yMale s/p L IT Fx 2/13    - Pain control  - WBAT  - mechanical/DVT ppx (Zoe)  - OOB/PT  - Dispo pending [facility]

## 2025-02-18 NOTE — PROVIDER CONTACT NOTE (OTHER) - ASSESSMENT
Pt A&O x4, VSS on 2L NC. no complaints of pain. pt dressing leaking a little through foam tape.
Pt resting in bed A&Ox4, Pt is due for amlodipine, doxazosin, hydralazine, and metoprolol at 2200 with a bp of 160/69 HR 60.

## 2025-02-19 ENCOUNTER — TRANSCRIPTION ENCOUNTER (OUTPATIENT)
Age: 89
End: 2025-02-19

## 2025-02-19 ENCOUNTER — NON-APPOINTMENT (OUTPATIENT)
Age: 89
End: 2025-02-19

## 2025-02-19 NOTE — PROGRESS NOTE ADULT - PROBLEM SELECTOR PLAN 1
Patient s/p an IMN of the left hip  Patient tolerated the procedure well  PO as tolerated  Patient to start physical therapy   DVT and GI prophylaxis as per ortho.
Patient s/p an IMN of the left hip  Patient tolerated the procedure well  PO as tolerated  Patient to start physical therapy   DVT and GI prophylaxis as per ortho.
YESSICA on CKD 4  - baseline Cr ~2.4-2.9 mg/dL. Cr increased 3.35 likely due to relative hypotension alongside anemia post-op. PVR 150cc.  - s/p repeat PRBCs and gentle hydration yesterday, creatinine improving to 3.19 today    -would defer further IVF    -continue holding diuretics for now.  Patient on torsemide 5x per week as outpatient.    -serum bicarb noted to be 17 today. on bicarb tabs 3x/week.  If serum bicarb continues to fall tomorrow, would increase tabs to daily dosing  - avoid nephrotoxic meds and dose meds per eGFR  - renal diet
YESSICA on CKD 4  - baseline Cr ~2.4-2.9 mg/dL. Cr increased 3.35 likely due to relative hypotension alongside anemia post-op. PVR 150cc.  -creatinine now slightly improved from peak, stable at 3.22 today.  Patient c/o some CERVANTES    -Patient on torsemide 5x per week as outpatient. d/w Ortho PA, patient, and wife at bedside-- would resume home dose at 3x/week (MWF) for now, first dose today    -serum bicarb noted to be 18 today. on bicarb tabs 3x/week.    - avoid nephrotoxic meds and dose meds per eGFR  - renal diet    no renal objection to d/c planning
Patient scheduled for an IMN of the left hip  No contraindication to the scheduled procedure  NPO after MN  DVT and GI prophylaxis as per ortho.
Patient s/p an IMN of the left hip  Patient tolerated the procedure well  PO as tolerated  Patient to start physical therapy   DVT and GI prophylaxis as per ortho.

## 2025-02-19 NOTE — PROGRESS NOTE ADULT - SUBJECTIVE AND OBJECTIVE BOX
[facility] DIVISION OF KIDNEY DISEASE AND HYPERTENSION  [phone]    RENAL FOLLOW UP NOTE- NEPHRHOHOSPITALIST  --------------------------------------------------------------------------------  Patient seen and examined this morning.  D/C planning in progress to [facility]    PAST HISTORY  --------------------------------------------------------------------------------  No significant changes to PMH, PSH, FHx, SHx, unless otherwise noted    ALLERGIES & MEDICATIONS  --------------------------------------------------------------------------------  Allergies    No Known Allergies    Intolerances      Standing Inpatient Medications  acetaminophen     Tablet .. 975 milliGRAM(s) Oral every 8 hours  allopurinol 100 milliGRAM(s) Oral daily  amLODIPine   Tablet 10 milliGRAM(s) Oral at bedtime  atorvastatin 10 milliGRAM(s) Oral at bedtime  doxazosin 2 milliGRAM(s) Oral at bedtime  finasteride 5 milliGRAM(s) Oral daily  folic acid 1 milliGRAM(s) Oral daily  hydrALAZINE 100 milliGRAM(s) Oral three times a day  isosorbide   mononitrate ER Tablet (IMDUR) 60 milliGRAM(s) Oral daily  metoprolol succinate ER 25 milliGRAM(s) Oral at bedtime  pantoprazole    Tablet 40 milliGRAM(s) Oral before breakfast  polyethylene glycol 3350 17 Gram(s) Oral at bedtime  senna 2 Tablet(s) Oral at bedtime  sodium bicarbonate 650 milliGRAM(s) Oral <User Schedule>  torsemide 10 milliGRAM(s) Oral <User Schedule>  torsemide 10 milliGRAM(s) Oral once    PRN Inpatient Medications  oxyCODONE    IR 2.5 milliGRAM(s) Oral every 4 hours PRN  oxyCODONE    IR 5 milliGRAM(s) Oral every 4 hours PRN      FOCUSED REVIEW OF SYSTEMS  --------------------------------------------------------------------------------  denies fevers/rigors  denies CP/palpitations  some SOB noted with minimal exertion  denies N/V abd pain  denies oliguria/dysuria      VITALS/PHYSICAL EXAM  --------------------------------------------------------------------------------  T(C): 36.5 (02-19-25 @ 12:00), Max: 37.4 (02-18-25 @ 21:18)  HR: 60 (02-19-25 @ 12:00) (60 - 77)  BP: 129/50 (02-19-25 @ 12:00) (129/50 - 139/75)  RR: 18 (02-19-25 @ 12:00) (18 - 18)  SpO2: 96% (02-19-25 @ 12:00) (94% - 100%)  Wt(kg): --        02-18-25 @ 07:01  -  02-19-25 @ 07:00  --------------------------------------------------------  IN: 780 mL / OUT: 1050 mL / NET: -270 mL    02-19-25 @ 07:01  -  02-19-25 @ 12:10  --------------------------------------------------------  IN: 240 mL / OUT: 0 mL / NET: 240 mL      Physical Exam:  	Gen: NAD, oob to chair  	Pulm: decreased breath sounds b/l bases  	CV: RRR, S1S2  	Abd: +BS, soft, nontender/nondistended  	: No suprapubic tenderness.  no macias          Extremity: LLE nonpitting edema  	Neuro: A&O x 3      LABS/STUDIES  --------------------------------------------------------------------------------              8.8    4.55  >-----------<  142      [02-19-25 @ 06:34]              27.1     138  |  109  |  72  ----------------------------<  106      [02-19-25 @ 06:35]  4.5   |  18  |  3.22        Ca     9.4     [02-19-25 @ 06:35]              Creatinine Trend:  SCr 3.22 [02-19 @ 06:35]  SCr 3.19 [02-18 @ 10:01]  SCr 3.35 [02-17 @ 07:23]  SCr 2.98 [02-16 @ 13:36]  SCr 2.97 [02-15 @ 05:31]              Urinalysis - [02-19-25 @ 06:35]      Color  / Appearance  / SG  / pH       Gluc 106 / Ketone   / Bili  / Urobili        Blood  / Protein  / Leuk Est  / Nitrite       RBC  / WBC  / Hyaline  / Gran  / Sq Epi  / Non Sq Epi  / Bacteria

## 2025-02-19 NOTE — PROGRESS NOTE ADULT - ASSESSMENT
89M with HLD, CAD, HTN, CKD 4, and secondary hyperparathyroidism who presented to [facility] after mechanical fall resulting in left hip fracture s/p IMN on 2/13/25. Nephrology consulted for CKD management.    
 Impression: Stable       Plan:   Continue present treatment                 Out of bed, ambulate, weight bearing as tolerated                 Physical therapy follow up                 Continue to monitor    Thiago Damon PA-C  Orthopaedic Surgery  Team pager [phone]  [phone]   
89 yo male s/p a fall at home presents with a left hip fracture. Patient is s/p an IMN
A/P: 89 y/o male s/p L hip ORIF with intramedullary nail for IT fx  - F/u AM labs  - Pain control  - Incentive Spirometry  - OOB to chair in AM, WBAT with LLE  - DVT: SCDs, Eliquis to start today  - GI PPx: Protonix  - PT/OT: WBAT LLE  - Dispo plan: pending PT eval    For all questions related to patient care, please reach out to the on-call team via the pager.     Rubi Morales, PGY 3  Orthopaedic Surgery  [facility] [phone]  [facility] [phone]  [facility] [phone]  
ASSESSMENT & PLAN  89yMale w/ L IT fx.  -NWB LLE, bedrest  -OR on 2/13  -f/u preop: CBC, BMP, coags, T&S x2, CXR, EKG  -NPO past midnight, IVF  -hold chemical DVT ppx for OR; SCDs OK  -please document medical clearance ASAP for OR  -pain control  -ice/cold compress    For all questions related to patient care, please reach out to the on-call team via the pager.     Rubi Morales, PGY 3  Orthopaedic Surgery  [facility] [phone]  [facility] [phone]  [facility] [phone]    
89 y/o M s/p left hip fracture fixation with IM nail POD#2, hydration PT,OT, d/c planning  Abril Daniel PA-C  Orthopaedic Surgery  Team pager [phone]  [facility] [phone]  [phone]     
A/p: 89y Male POD#3 s/p L Intertrochanteric Femur Fx IM Nail ORIF.  VSS. NAD.    PT/OT-WBAT LLE  FU AM labs today  Appreciate Nephrology following and recommendations  Dressing change x 2  IS  DVT PPx: Eliquis 2.5mg PO BID and SCDs  Pain Control  Continue Current Tx.  Dispo plan to Sub Acute Rehab    Chava Cooper PA-C  Orthopedic Surgery Team  Team Pager: p.5 [phone]
89 yo male s/p a fall at home presents with a left hip fracture. Patient is scheduled for an IMN
89M with HLD, CAD, HTN, CKD 4, and secondary hyperparathyroidism who presented to [facility] after mechanical fall resulting in left hip fracture s/p IMN on 2/13/25. Nephrology consulted for CKD management.    #YESSICA on CKD 4  - baseline Cr ~2.4-2.9 mg/dL. Cr increased 3.35 likely due to relative hypotension post-op. PVR 150cc.  - Agree w/ gentle IVF   - Pt to get 1u PRBC today, which will help  - avoid nephrotoxic meds and dose meds per eGFR  - renal diet    #HTN  - continue current meds    If you have any questions, please feel free to contact me  Cain Cheatham  Nephrology Fellow  [employer]/Page [phone]  (After 5pm or on weekends please page the on-call fellow)
89yMale s/p L IT Fx 2/13 with incision stable after placement of pressure dressing and holding DVT prophylaxis on 2/18    - Pain control  - WBAT  - hold mechanical/DVT ppx (Zoe) to resume 2/20  - OOB/PT  - Dispo: pending [facility]
89M with HLD, CAD, HTN, CKD 4, and secondary hyperparathyroidism who presented to [facility] after mechanical fall resulting in left hip fracture s/p IMN on 2/13/25. Nephrology consulted for CKD management.    
89 yo male s/p a fall at home presents with a left hip fracture. Patient is scheduled for an IMN
89 yo male s/p a fall at home presents with a left hip fracture. Patient is scheduled for an IMN
89 yo male s/p a fall at home presents with a left hip fracture. Patient is scheduled for an IMN
89 yo male s/p a fall at home presents with a left hip fracture. Patient is scheduled for an IMN
89 yo male s/p a fall at home presents with a left hip fracture. Patient is scheduled for an IMN

## 2025-02-19 NOTE — PROGRESS NOTE ADULT - PROVIDER SPECIALTY LIST ADULT
Orthopedics
Nephrology
Nephrology
Orthopedics
Internal Medicine
Nephrology
Orthopedics
Internal Medicine

## 2025-02-19 NOTE — PROGRESS NOTE ADULT - PROBLEM SELECTOR PROBLEM 6
ABLA (acute blood loss anemia)

## 2025-02-19 NOTE — PROGRESS NOTE ADULT - PROBLEM SELECTOR PLAN 5
Pain meds as needed  follow for oversedation  GI regime to prevent constipation.

## 2025-02-19 NOTE — PROGRESS NOTE ADULT - PROBLEM SELECTOR PLAN 6
Patient found to be anemic   Patient received a second unit of PRBC  continue to monitor H&H and transfuse as needed
Patient found to be anemic   Transferred 1 unit of PRBC  continue to monitor H&H and transfuse as needed
Patient found to be anemic   Patient receieved a second unit of PRBC  continue to monitor H&H and transfuse as needed
Patient found to be anemic   Patient received a second unit of PRBC  continue to monitor H&H and transfuse as needed
Patient found to be anemic   Transferred 1 unit of PRBC  continue to monitor H&H and transfuse as needed
Patient found to be anemic   Transferred 1 unit of PRBC  continue to monitor H&H and transfuse as needed

## 2025-02-19 NOTE — PROGRESS NOTE ADULT - PROBLEM SELECTOR PLAN 2
continue current meds  monitor BP  will adjust meds as needed.
continue current meds  monitor BP  BP has been controlled   will adjust meds as needed.
continue current meds  monitor BP  will adjust meds as needed.
continue current meds  monitor BP  BP has been controlled   will adjust meds as needed.
continue current meds  monitor BP  BP has been controlled   will adjust meds as needed.

## 2025-02-19 NOTE — PROGRESS NOTE ADULT - PROBLEM SELECTOR PROBLEM 4
"Keep foot elevated as much as possible for the next few days. Wash gently with soap and water twice daily. Do not soak. Apply bacitracin, gauze, and \"ira tape\" as shown in ER. Wear surgical shoes for the next week or two, and then can switch to supportive shoe. Follow up with podiatry and PCP.   "
Macular degeneration

## 2025-02-19 NOTE — PROGRESS NOTE ADULT - REASON FOR ADMISSION
L IT Fx
L IT Fx/ IM Nail
L IT Fx

## 2025-02-19 NOTE — PROGRESS NOTE ADULT - PROBLEM SELECTOR PROBLEM 1
Hip fracture, left
Acute on chronic kidney failure
Hip fracture, left
Acute on chronic kidney failure
Hip fracture, left

## 2025-02-19 NOTE — PROGRESS NOTE ADULT - SUBJECTIVE AND OBJECTIVE BOX
Orthopaedic Surgery Progress Note    Subjective:   Patient seen and examined. No acute events overnight. States pain is controlled. Denies fever/chills/chest pain/shortness of breath/numbness/tingling.    Objective:  T(C): 36.9 (02-19-25 @ 04:24), Max: 37.4 (02-18-25 @ 21:18)  HR: 66 (02-19-25 @ 04:24) (66 - 77)  BP: 137/51 (02-19-25 @ 04:24) (136/77 - 139/75)  RR: 18 (02-19-25 @ 04:24) (18 - 18)  SpO2: 96% (02-19-25 @ 04:24) (94% - 100%)  Wt(kg): --    02-18 @ 07:01  -  02-19 @ 07:00  --------------------------------------------------------  IN: 780 mL / OUT: 1050 mL / NET: -270 mL      Physical Exam:  Gen: NAD, alert and oriented  Resp: Unlabored breathing  LLE: Skin intact, no ecchymosis, pressure dressing c/d/i       SILT DP/SP/Gary/Saph/tib       +EHL/FHL/TA/Gastroc,        DP+,        soft compartments, no calf ttp                          8.8    4.55  )-----------( 142      ( 19 Feb 2025 06:34 )             27.1     02-19    138  |  109[H]  |  72[H]  ----------------------------<  106[H]  4.5   |  18[L]  |  3.22[H]    Ca    9.4      19 Feb 2025 06:35        Urinalysis Basic - ( 19 Feb 2025 06:35 )    Color: x / Appearance: x / SG: x / pH: x  Gluc: 106 mg/dL / Ketone: x  / Bili: x / Urobili: x   Blood: x / Protein: x / Nitrite: x   Leuk Esterase: x / RBC: x / WBC x   Sq Epi: x / Non Sq Epi: x / Bacteria: x

## 2025-02-19 NOTE — PROGRESS NOTE ADULT - NSPROGADDITIONALINFOA_GEN_ALL_CORE
Patient will need follow-up with [facility] Division of Kidney Disease and Hypertension in 1-2 months with Dr. Tolbert's team s/p rehab stay (I do not see patients in the office).  Patient has already contacted the office to let them know he will be in Rehab, and patient has appointment scheduled for April    [facility] DIVISION OF KIDNEY DISEASE AND HYPERTENSION    [address] [place] [address]    [phone]    Please do not hesitate to TEAMS Dr. De La Rosa if further input needed during the day.  For questions Weekdays after 5PM and on weekends, please page the Renal Fellow on call.

## 2025-02-19 NOTE — DISCHARGE NOTE NURSING/CASE MANAGEMENT/SOCIAL WORK - FINANCIAL ASSISTANCE
[facility] provides services at a reduced cost to those who are determined to be eligible through [facility]’s financial assistance program. Information regarding [facility]’s financial assistance program can be found by going to [email or web address] or by calling [phone].

## 2025-02-19 NOTE — PROGRESS NOTE ADULT - PROBLEM SELECTOR PLAN 3
continue to monitor creatinine   continue sodium bicarb  nephrology following  avoid nephrotoxic agents.
creatinine continues to rise  would check daily BMP  continue sodium bicarb  nephrology following  Lasix held  Patient started on IVF  avoid nephrotoxic agents.
continue sodium bicarb  nephrology following  Lasix held  IVF as needed  avoid nephrotoxic agents.  Patient to follow up with neprology
creatinine slightly improved today  continue to trend creatinine  continue sodium bicarb  nephrology following  Lasix held  IVF as needed  avoid nephrotoxic agents.
continue to monitor creatinine   continue sodium bicarb  start gentle hydration  nephrology to see the Patient   avoid nephrotoxic agents.
continue to monitor creatinine   would check daily BMP  continue sodium bicarb  nephrology following  avoid nephrotoxic agents.
continue to monitor creatinine   continue sodium bicarb  nephrology following  avoid nephrotoxic agents.

## 2025-02-19 NOTE — PROGRESS NOTE ADULT - SUBJECTIVE AND OBJECTIVE BOX
89-year-old male patient past medical history of hypertension  CKD, and hyperlipidemia who presents to the emergency department after a fall.  Patient states he was attempting to push a fridge with wheels when he slipped and fell on his left side.  Presenting to the emergency department with left hip pain since. Patient was brought to [facility] for further evaluation and treatment. In the ED he was found to have a left hip fracture and is s/p an IMN. Patient seen now resting comfortably. Patient tolerated he procedure well. Patient has started working with physical therapy. Seen OOB sitting in a chair       MEDICATIONS  (STANDING):  acetaminophen     Tablet .. 975 milliGRAM(s) Oral every 8 hours  allopurinol 100 milliGRAM(s) Oral daily  amLODIPine   Tablet 10 milliGRAM(s) Oral at bedtime  atorvastatin 10 milliGRAM(s) Oral at bedtime  doxazosin 2 milliGRAM(s) Oral at bedtime  finasteride 5 milliGRAM(s) Oral daily  folic acid 1 milliGRAM(s) Oral daily  hydrALAZINE 100 milliGRAM(s) Oral three times a day  isosorbide   mononitrate ER Tablet (IMDUR) 60 milliGRAM(s) Oral daily  metoprolol succinate ER 25 milliGRAM(s) Oral at bedtime  pantoprazole    Tablet 40 milliGRAM(s) Oral before breakfast  polyethylene glycol 3350 17 Gram(s) Oral at bedtime  senna 2 Tablet(s) Oral at bedtime  sodium bicarbonate 650 milliGRAM(s) Oral <User Schedule>  sodium chloride 0.9%. 1000 milliLiter(s) (60 mL/Hr) IV Continuous <Continuous>    MEDICATIONS  (PRN):  oxyCODONE    IR 5 milliGRAM(s) Oral every 4 hours PRN Severe Pain (7 - 10)  oxyCODONE    IR 2.5 milliGRAM(s) Oral every 4 hours PRN Moderate Pain (4 - 6)          VITALS:   T(C): 36.6 (02-18-25 @ 12:17), Max: 36.9 (02-17-25 @ 18:45)  HR: 69 (02-18-25 @ 12:17) (58 - 69)  BP: 138/59 (02-18-25 @ 12:17) (108/50 - 146/65)  RR: 18 (02-18-25 @ 12:17) (18 - 18)  SpO2: 98% (02-18-25 @ 12:17) (94% - 98%)  Wt(kg): --      PHYSICAL EXAM:  GENERAL: NAD, well-groomed, well-developed  HEAD:  Atraumatic, Normocephalic  EYES: EOMI, PERRLA, conjunctiva and sclera clear  ENMT: No tonsillar erythema, exudates, or enlargement; Moist mucous membranes, Good dentition, No lesions  NECK: Supple, No JVD, Normal thyroid  NERVOUS SYSTEM:  Alert & Oriented X3, Good concentration; Motor Strength 5/5 B/L upper and lower extremities; DTRs 2+ intact and symmetric  CHEST/LUNG: Clear to percussion bilaterally; No rales, rhonchi, wheezing, or rubs  HEART: Regular rate and rhythm; No murmurs, rubs, or gallops  ABDOMEN: Soft, Nontender, Nondistended; Bowel sounds present  EXTREMITIES:  2+ Peripheral Pulses, No clubbing, cyanosis, or edema  LYMPH: No lymphadenopathy noted  SKIN: No rashes or lesions  LABS:        CBC Full  -  ( 18 Feb 2025 10:01 )  WBC Count : 5.14 K/uL  RBC Count : 3.09 M/uL  Hemoglobin : 9.6 g/dL  Hematocrit : 29.2 %  Platelet Count - Automated : 156 K/uL  Mean Cell Volume : 94.5 fl  Mean Cell Hemoglobin : 31.1 pg  Mean Cell Hemoglobin Concentration : 32.9 g/dL  Auto Neutrophil # : x  Auto Lymphocyte # : x  Auto Monocyte # : x  Auto Eosinophil # : x  Auto Basophil # : x  Auto Neutrophil % : x  Auto Lymphocyte % : x  Auto Monocyte % : x  Auto Eosinophil % : x  Auto Basophil % : x    02-18    138  |  108  |  69[H]  ----------------------------<  120[H]  4.8   |  17[L]  |  3.19[H]    Ca    9.6      18 Feb 2025 10:01          Urinalysis Basic - ( 18 Feb 2025 10:01 )    Color: x / Appearance: x / SG: x / pH: x  Gluc: 120 mg/dL / Ketone: x  / Bili: x / Urobili: x   Blood: x / Protein: x / Nitrite: x   Leuk Esterase: x / RBC: x / WBC x   Sq Epi: x / Non Sq Epi: x / Bacteria: x      CAPILLARY BLOOD GLUCOSE          RADIOLOGY & ADDITIONAL TESTS:

## 2025-02-19 NOTE — PROGRESS NOTE ADULT - PROBLEM SELECTOR PLAN 4
Not currently on meds  continue to monitor.

## 2025-02-19 NOTE — PROGRESS NOTE ADULT - TIME BILLING
Discussed treatment plan with patient and family at bedside.
Patient DCed to rehab  continue current meds  PO as tolerated  activity as tolerated  follow up with ortho and nephrology  Patient and family aware of plan
Discussed treatment plan with patient and family at bedside.

## 2025-02-19 NOTE — DISCHARGE NOTE NURSING/CASE MANAGEMENT/SOCIAL WORK - PATIENT PORTAL LINK FT
You can access the FollowMyHealth Patient Portal offered by [facility] by registering at the following website: [email or web address]. By joining [employer]’s FollowMyHealth portal, you will also be able to view your health information using other applications (apps) compatible with our system.

## 2025-03-03 ENCOUNTER — APPOINTMENT (OUTPATIENT)
Dept: ORTHOPEDIC SURGERY | Facility: CLINIC | Age: 89
End: 2025-03-03

## 2025-04-15 ENCOUNTER — APPOINTMENT (OUTPATIENT)
Dept: NEPHROLOGY | Facility: CLINIC | Age: 89
End: 2025-04-15

## 2025-05-15 ENCOUNTER — APPOINTMENT (OUTPATIENT)
Dept: INTERNAL MEDICINE | Facility: CLINIC | Age: 89
End: 2025-05-15

## (undated) DEVICE — WARMING BLANKET UPPER ADULT

## (undated) DEVICE — MEDICATION LABELS W MARKER

## (undated) DEVICE — TAPE SILK 3"

## (undated) DEVICE — FOLEY TRAY 16FR 5CC LTX UMETER CLOSED

## (undated) DEVICE — BLADE SCALPEL SAFETYLOCK #10

## (undated) DEVICE — DRAPE INSTRUMENT POUCH 6.75" X 11"

## (undated) DEVICE — SUT POLYSORB 2-0 30" GS-21 UNDYED

## (undated) DEVICE — DRAPE 1/2 SHEET 40X57"

## (undated) DEVICE — GLV 6.5 PROTEXIS (WHITE)

## (undated) DEVICE — SUT POLYSORB 0 30" GS-21 UNDYED

## (undated) DEVICE — DRAPE LIGHT HANDLE COVER (BLUE)

## (undated) DEVICE — GOWN TRIMAX LG

## (undated) DEVICE — GLV 8 PROTEXIS (WHITE)

## (undated) DEVICE — LAP PAD 18 X 18"

## (undated) DEVICE — GLV 7 PROTEXIS (WHITE)

## (undated) DEVICE — SYR ASEPTO

## (undated) DEVICE — WARMING BLANKET LOWER ADULT

## (undated) DEVICE — GLV 7.5 PROTEXIS (WHITE)

## (undated) DEVICE — SPECIMEN CONTAINER 100ML

## (undated) DEVICE — POSITIONER CUSHION INSERT PRONE VIEW LG

## (undated) DEVICE — PREP CHLORAPREP HI-LITE ORANGE 26ML

## (undated) DEVICE — DRAPE U (CLEAR) 47 X 51" NON STERILE

## (undated) DEVICE — ELCTR BOVIE PENCIL HANDPIECE

## (undated) DEVICE — SOL IRR POUR NS 0.9% 500ML

## (undated) DEVICE — STAPLER SKIN VISI-STAT 35 WIDE

## (undated) DEVICE — LONE STAR ELASTIC STAY HOOK 5MM SHARP

## (undated) DEVICE — DRAPE SPLIT SHEET 77" X 108"

## (undated) DEVICE — VENODYNE/SCD SLEEVE CALF MEDIUM

## (undated) DEVICE — LONE STAR RETRACTOR SQUARE 14.1CMX14.1CM DISP

## (undated) DEVICE — NDL COUNTER FOAM AND MAGNET 40-70

## (undated) DEVICE — GLV 8.5 PROTEXIS (WHITE)

## (undated) DEVICE — DRAPE C ARM UNIVERSAL

## (undated) DEVICE — SUT MONOSOF 3-0 18" C-14

## (undated) DEVICE — SUT MONOCRYL 3-0 18" PS-2 UNDYED

## (undated) DEVICE — DRSG WEBRIL 6"

## (undated) DEVICE — DRAPE MAGNETIC INSTRUMENT MEDIUM

## (undated) DEVICE — POSITIONER FOAM EGG CRATE ULNAR 2PCS (PINK)

## (undated) DEVICE — SOL IRR POUR H2O 1000ML

## (undated) DEVICE — DRSG STERISTRIPS 0.5 X 4"

## (undated) DEVICE — DRAPE 3/4 SHEET W REINFORCEMENT 56X77"

## (undated) DEVICE — DRSG ACE BANDAGE 6"

## (undated) DEVICE — DRILL BIT SYNTHES SPINE 3-FLUTE 4.2X145MM

## (undated) DEVICE — DRSG OPSITE 13.75 X 4"

## (undated) DEVICE — SOL IRR POUR H2O 250ML

## (undated) DEVICE — DRAPE TOWEL BLUE 17" X 24"

## (undated) DEVICE — PACK HIP PINNING

## (undated) DEVICE — SYNTHES REAMING ROD WITH BALL TIP 2.5MM 950MM

## (undated) DEVICE — SYR LUER LOK 10CC

## (undated) DEVICE — SUCTION YANKAUER NO CONTROL VENT

## (undated) DEVICE — VISITEC 4X4

## (undated) DEVICE — BLADE SCALPEL SAFETYLOCK #15

## (undated) DEVICE — LIGASURE IMPACT

## (undated) DEVICE — POSITIONER FOAM HEADREST (PINK)

## (undated) DEVICE — DRILL BIT SYNTHES ORTHO CALIBRATED 4.2MM X 330MM

## (undated) DEVICE — SYR TB 1CC 25G X 5/8 (BLUE)

## (undated) DEVICE — FOLEY HOLDER STATLOCK 2 WAY ADULT

## (undated) DEVICE — SUT CHROMIC 3-0 30" V-20

## (undated) DEVICE — DRSG TEGADERM 4 X 4.75"

## (undated) DEVICE — TUBING SUCTION 20FT

## (undated) DEVICE — SUT CHROMIC 2-0 30" V-20

## (undated) DEVICE — ELCTR HEX BLADE

## (undated) DEVICE — VENODYNE/SCD SLEEVE CALF LARGE

## (undated) DEVICE — DRAPE MAYO STAND 30"

## (undated) DEVICE — GUIDEWIRE SYNTHES 3.2MM X 400MM

## (undated) DEVICE — DRSG TEGADERM 6"X8"

## (undated) DEVICE — DRAPE IOBAN 23" X 23"